# Patient Record
Sex: FEMALE | Race: WHITE | URBAN - METROPOLITAN AREA
[De-identification: names, ages, dates, MRNs, and addresses within clinical notes are randomized per-mention and may not be internally consistent; named-entity substitution may affect disease eponyms.]

---

## 2017-01-03 ENCOUNTER — EMERGENCY (EMERGENCY)
Facility: HOSPITAL | Age: 82
LOS: 0 days | Discharge: HOME | End: 2017-01-04
Admitting: EMERGENCY MEDICINE
Payer: MEDICARE

## 2017-01-03 DIAGNOSIS — R06.02 SHORTNESS OF BREATH: ICD-10-CM

## 2017-01-03 DIAGNOSIS — E03.9 HYPOTHYROIDISM, UNSPECIFIED: ICD-10-CM

## 2017-01-03 DIAGNOSIS — I48.91 UNSPECIFIED ATRIAL FIBRILLATION: ICD-10-CM

## 2017-01-03 DIAGNOSIS — M06.9 RHEUMATOID ARTHRITIS, UNSPECIFIED: ICD-10-CM

## 2017-01-03 DIAGNOSIS — R06.09 OTHER FORMS OF DYSPNEA: ICD-10-CM

## 2017-01-03 PROCEDURE — 71010: CPT | Mod: 26

## 2017-01-03 PROCEDURE — 93010 ELECTROCARDIOGRAM REPORT: CPT

## 2017-01-03 PROCEDURE — 71250 CT THORAX DX C-: CPT | Mod: 26

## 2017-01-03 PROCEDURE — 99284 EMERGENCY DEPT VISIT MOD MDM: CPT

## 2017-03-15 ENCOUNTER — RESULT REVIEW (OUTPATIENT)
Age: 82
End: 2017-03-15

## 2017-04-02 ENCOUNTER — RESULT REVIEW (OUTPATIENT)
Age: 82
End: 2017-04-02

## 2017-04-16 ENCOUNTER — INPATIENT (INPATIENT)
Facility: HOSPITAL | Age: 82
LOS: 5 days | Discharge: HOME HEALTH | End: 2017-04-22
Attending: INTERNAL MEDICINE | Admitting: FAMILY MEDICINE
Payer: COMMERCIAL

## 2017-04-16 VITALS
WEIGHT: 145.06 LBS | SYSTOLIC BLOOD PRESSURE: 143 MMHG | OXYGEN SATURATION: 97 % | HEART RATE: 99 BPM | DIASTOLIC BLOOD PRESSURE: 72 MMHG | RESPIRATION RATE: 15 BRPM | HEIGHT: 60 IN | TEMPERATURE: 99 F

## 2017-04-16 LAB
ADD ON TEST-SPECIMEN IN LAB: SIGNIFICANT CHANGE UP
ALBUMIN SERPL ELPH-MCNC: 3.5 G/DL — SIGNIFICANT CHANGE UP (ref 3.3–5)
ALP SERPL-CCNC: 52 U/L — SIGNIFICANT CHANGE UP (ref 40–120)
ALT FLD-CCNC: 18 U/L — SIGNIFICANT CHANGE UP (ref 12–78)
ANION GAP SERPL CALC-SCNC: 11 MMOL/L — SIGNIFICANT CHANGE UP (ref 5–17)
APPEARANCE UR: CLEAR — SIGNIFICANT CHANGE UP
AST SERPL-CCNC: 15 U/L — SIGNIFICANT CHANGE UP (ref 15–37)
BACTERIA # UR AUTO: (no result)
BASOPHILS # BLD AUTO: 0 K/UL — SIGNIFICANT CHANGE UP (ref 0–0.2)
BASOPHILS NFR BLD AUTO: 0.6 % — SIGNIFICANT CHANGE UP (ref 0–2)
BILIRUB SERPL-MCNC: 1.1 MG/DL — SIGNIFICANT CHANGE UP (ref 0.2–1.2)
BILIRUB UR-MCNC: NEGATIVE — SIGNIFICANT CHANGE UP
BUN SERPL-MCNC: 19 MG/DL — SIGNIFICANT CHANGE UP (ref 7–23)
CALCIUM SERPL-MCNC: 8.7 MG/DL — SIGNIFICANT CHANGE UP (ref 8.5–10.1)
CHLORIDE SERPL-SCNC: 108 MMOL/L — SIGNIFICANT CHANGE UP (ref 96–108)
CO2 SERPL-SCNC: 25 MMOL/L — SIGNIFICANT CHANGE UP (ref 22–31)
COLOR SPEC: YELLOW — SIGNIFICANT CHANGE UP
CREAT SERPL-MCNC: 1.11 MG/DL — SIGNIFICANT CHANGE UP (ref 0.5–1.3)
D DIMER BLD IA.RAPID-MCNC: 458 NG/ML DDU — HIGH
DIFF PNL FLD: (no result)
EOSINOPHIL # BLD AUTO: 0 K/UL — SIGNIFICANT CHANGE UP (ref 0–0.5)
EOSINOPHIL NFR BLD AUTO: 0 % — SIGNIFICANT CHANGE UP (ref 0–6)
EPI CELLS # UR: SIGNIFICANT CHANGE UP
GLUCOSE SERPL-MCNC: 214 MG/DL — HIGH (ref 70–99)
GLUCOSE UR QL: 1000 MG/DL
HCT VFR BLD CALC: 37.8 % — SIGNIFICANT CHANGE UP (ref 34.5–45)
HGB BLD-MCNC: 12.7 G/DL — SIGNIFICANT CHANGE UP (ref 11.5–15.5)
KETONES UR-MCNC: NEGATIVE — SIGNIFICANT CHANGE UP
LEUKOCYTE ESTERASE UR-ACNC: NEGATIVE — SIGNIFICANT CHANGE UP
LYMPHOCYTES # BLD AUTO: 1.1 K/UL — SIGNIFICANT CHANGE UP (ref 1–3.3)
LYMPHOCYTES # BLD AUTO: 15.6 % — SIGNIFICANT CHANGE UP (ref 13–44)
MAGNESIUM SERPL-MCNC: 2.2 MG/DL — SIGNIFICANT CHANGE UP (ref 1.8–2.4)
MCHC RBC-ENTMCNC: 32.6 PG — SIGNIFICANT CHANGE UP (ref 27–34)
MCHC RBC-ENTMCNC: 33.6 GM/DL — SIGNIFICANT CHANGE UP (ref 32–36)
MCV RBC AUTO: 96.8 FL — SIGNIFICANT CHANGE UP (ref 80–100)
MONOCYTES # BLD AUTO: 0.3 K/UL — SIGNIFICANT CHANGE UP (ref 0–0.9)
MONOCYTES NFR BLD AUTO: 4.3 % — SIGNIFICANT CHANGE UP (ref 2–14)
NEUTROPHILS # BLD AUTO: 5.7 K/UL — SIGNIFICANT CHANGE UP (ref 1.8–7.4)
NEUTROPHILS NFR BLD AUTO: 79.4 % — HIGH (ref 43–77)
NITRITE UR-MCNC: NEGATIVE — SIGNIFICANT CHANGE UP
NT-PROBNP SERPL-SCNC: 168 PG/ML — SIGNIFICANT CHANGE UP (ref 0–450)
PH UR: 7 — SIGNIFICANT CHANGE UP (ref 4.8–8)
PLATELET # BLD AUTO: 267 K/UL — SIGNIFICANT CHANGE UP (ref 150–400)
POTASSIUM SERPL-MCNC: 4.4 MMOL/L — SIGNIFICANT CHANGE UP (ref 3.5–5.3)
POTASSIUM SERPL-SCNC: 4.4 MMOL/L — SIGNIFICANT CHANGE UP (ref 3.5–5.3)
PROT SERPL-MCNC: 6.6 GM/DL — SIGNIFICANT CHANGE UP (ref 6–8.3)
PROT UR-MCNC: NEGATIVE MG/DL — SIGNIFICANT CHANGE UP
RBC # BLD: 3.9 M/UL — SIGNIFICANT CHANGE UP (ref 3.8–5.2)
RBC # FLD: 15.5 % — HIGH (ref 10.3–14.5)
RBC CASTS # UR COMP ASSIST: (no result) /HPF (ref 0–4)
SODIUM SERPL-SCNC: 144 MMOL/L — SIGNIFICANT CHANGE UP (ref 135–145)
SP GR SPEC: 1 — LOW (ref 1.01–1.02)
TROPONIN I SERPL-MCNC: <0.015 NG/ML — SIGNIFICANT CHANGE UP (ref 0.01–0.04)
UROBILINOGEN FLD QL: NEGATIVE MG/DL — SIGNIFICANT CHANGE UP
WBC # BLD: 7.2 K/UL — SIGNIFICANT CHANGE UP (ref 3.8–10.5)
WBC # FLD AUTO: 7.2 K/UL — SIGNIFICANT CHANGE UP (ref 3.8–10.5)
WBC UR QL: SIGNIFICANT CHANGE UP

## 2017-04-16 PROCEDURE — 93010 ELECTROCARDIOGRAM REPORT: CPT

## 2017-04-16 PROCEDURE — 99285 EMERGENCY DEPT VISIT HI MDM: CPT

## 2017-04-16 PROCEDURE — 71010: CPT | Mod: 26

## 2017-04-16 RX ORDER — FUROSEMIDE 40 MG
20 TABLET ORAL ONCE
Qty: 0 | Refills: 0 | Status: COMPLETED | OUTPATIENT
Start: 2017-04-16 | End: 2017-04-16

## 2017-04-16 RX ADMIN — Medication 20 MILLIGRAM(S): at 20:10

## 2017-04-16 NOTE — ED ADULT NURSE REASSESSMENT NOTE - NS ED NURSE REASSESS COMMENT FT1
2200: Patient watching tv, no distress, only complaint is "having to pee all the time because of that medicine." Bedside commode brought for patient and moved next to bed to allow for easy transfer. Patient instructed to use call bell for staff assistance when transferring. Awaiting MD reassessment and disposition, will continue to monitor/reassess.   2330: Patient sleeping, but easily arousable to verbal stimuli. Patient is awaiting radiology examinations and disposition. No apparent distress/stated complaints at this time. MD Parrish updated on patient's status, will continue to monitor/reassess.

## 2017-04-16 NOTE — ED PROVIDER NOTE - PROGRESS NOTE DETAILS
Pt with normal troponin, BNP, CXR appears no pulmonary edema, pleural effusion, or pna.  D-dimer is elevated.  GFR 44, but Cr 1.11.  I think at this time, pt may have CHF, vs PE.  Given elevated d-dimer, will obtain CT PE study, as Cr 1.11 feel okay for contrast at this time.  Lasix given.  Dopplers of lower extremities ordered.

## 2017-04-16 NOTE — ED PROVIDER NOTE - CARE PLAN
Principal Discharge DX:	Chest pain, unspecified type  Secondary Diagnosis:	SOB (shortness of breath)

## 2017-04-16 NOTE — ED ADULT NURSE NOTE - ED STAT RN HANDOFF DETAILS
Received report from SONG Correa and reassessed patient. Agree with the previous RN assessment. Patient is awaiting lab/urine results and disposition. No change in status or complaints at this time. Will continue to monitor/reassess.

## 2017-04-16 NOTE — ED PROVIDER NOTE - NS ED MD SCRIBE ATTENDING SCRIBE SECTIONS
HISTORY OF PRESENT ILLNESS/PAST MEDICAL/SURGICAL/SOCIAL HISTORY/REVIEW OF SYSTEMS/PROGRESS NOTE/RESULTS/VITAL SIGNS( Pullset)/DISPOSITION/PHYSICAL EXAM

## 2017-04-16 NOTE — ED PROVIDER NOTE - OBJECTIVE STATEMENT
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 Pt states she was eating easter dinner tonight when she felt that she could breath and chew at the same time which prompted her to seek evaluation. Pt denies CP, n/v/d, HA, dysuria. Cards is [facility], pt states she has an appointment with him tomorrow for an echocardiogram. Pt started lasix 20mg 1x a day 2 weeks ago. No other complaints. Pt states she has to sleep with 3 pillows at night to feel comfortable. 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 Pt states she was eating easter dinner tonight when she felt that she could breath and chew at the same time which prompted her to seek evaluation. Pt denies CP, n/v/d, HA, dysuria. Cards is Pap[facility]o, pt states she has an appointment with him tomorrow for an echocardiogram. Pt started lasix 20mg 1x a day 2 weeks ago. No other complaints. Pt states she has to sleep with 3 pillows at night to feel comfortable.

## 2017-04-16 NOTE — ED ADULT NURSE NOTE - OBJECTIVE STATEMENT
rec'd pt c/o SOB and weakness x 1 week. pt has bilateral pitting edema noted on lower extremities. VSS, no acute distress noted.

## 2017-04-17 LAB
ANION GAP SERPL CALC-SCNC: 8 MMOL/L — SIGNIFICANT CHANGE UP (ref 5–17)
BASOPHILS # BLD AUTO: 0.1 K/UL — SIGNIFICANT CHANGE UP (ref 0–0.2)
BASOPHILS NFR BLD AUTO: 0.8 % — SIGNIFICANT CHANGE UP (ref 0–2)
BUN SERPL-MCNC: 15 MG/DL — SIGNIFICANT CHANGE UP (ref 7–23)
CALCIUM SERPL-MCNC: 9.4 MG/DL — SIGNIFICANT CHANGE UP (ref 8.5–10.1)
CHLORIDE SERPL-SCNC: 105 MMOL/L — SIGNIFICANT CHANGE UP (ref 96–108)
CO2 SERPL-SCNC: 29 MMOL/L — SIGNIFICANT CHANGE UP (ref 22–31)
CREAT SERPL-MCNC: 0.77 MG/DL — SIGNIFICANT CHANGE UP (ref 0.5–1.3)
DIGOXIN SERPL-MCNC: 0.5 NG/ML — LOW (ref 0.8–2)
EOSINOPHIL # BLD AUTO: 0.1 K/UL — SIGNIFICANT CHANGE UP (ref 0–0.5)
EOSINOPHIL NFR BLD AUTO: 1.1 % — SIGNIFICANT CHANGE UP (ref 0–6)
GLUCOSE SERPL-MCNC: 105 MG/DL — HIGH (ref 70–99)
HBA1C BLD-MCNC: 7 % — HIGH (ref 4–5.6)
HCT VFR BLD CALC: 40.7 % — SIGNIFICANT CHANGE UP (ref 34.5–45)
HGB BLD-MCNC: 13.3 G/DL — SIGNIFICANT CHANGE UP (ref 11.5–15.5)
LYMPHOCYTES # BLD AUTO: 1.8 K/UL — SIGNIFICANT CHANGE UP (ref 1–3.3)
LYMPHOCYTES # BLD AUTO: 27.4 % — SIGNIFICANT CHANGE UP (ref 13–44)
MCHC RBC-ENTMCNC: 31.4 PG — SIGNIFICANT CHANGE UP (ref 27–34)
MCHC RBC-ENTMCNC: 32.6 GM/DL — SIGNIFICANT CHANGE UP (ref 32–36)
MCV RBC AUTO: 96.3 FL — SIGNIFICANT CHANGE UP (ref 80–100)
MONOCYTES # BLD AUTO: 0.6 K/UL — SIGNIFICANT CHANGE UP (ref 0–0.9)
MONOCYTES NFR BLD AUTO: 8.7 % — SIGNIFICANT CHANGE UP (ref 2–14)
NEUTROPHILS # BLD AUTO: 4.1 K/UL — SIGNIFICANT CHANGE UP (ref 1.8–7.4)
NEUTROPHILS NFR BLD AUTO: 62 % — SIGNIFICANT CHANGE UP (ref 43–77)
PLATELET # BLD AUTO: 311 K/UL — SIGNIFICANT CHANGE UP (ref 150–400)
POTASSIUM SERPL-MCNC: 3.6 MMOL/L — SIGNIFICANT CHANGE UP (ref 3.5–5.3)
POTASSIUM SERPL-SCNC: 3.6 MMOL/L — SIGNIFICANT CHANGE UP (ref 3.5–5.3)
RBC # BLD: 4.22 M/UL — SIGNIFICANT CHANGE UP (ref 3.8–5.2)
RBC # FLD: 15.9 % — HIGH (ref 10.3–14.5)
SODIUM SERPL-SCNC: 142 MMOL/L — SIGNIFICANT CHANGE UP (ref 135–145)
TROPONIN I SERPL-MCNC: <0.015 NG/ML — SIGNIFICANT CHANGE UP (ref 0.01–0.04)
WBC # BLD: 6.6 K/UL — SIGNIFICANT CHANGE UP (ref 3.8–10.5)
WBC # FLD AUTO: 6.6 K/UL — SIGNIFICANT CHANGE UP (ref 3.8–10.5)

## 2017-04-17 PROCEDURE — 93306 TTE W/DOPPLER COMPLETE: CPT | Mod: 26

## 2017-04-17 PROCEDURE — 71275 CT ANGIOGRAPHY CHEST: CPT | Mod: 26

## 2017-04-17 PROCEDURE — 76700 US EXAM ABDOM COMPLETE: CPT | Mod: 26

## 2017-04-17 PROCEDURE — 93970 EXTREMITY STUDY: CPT | Mod: 26

## 2017-04-17 PROCEDURE — 76856 US EXAM PELVIC COMPLETE: CPT | Mod: 26

## 2017-04-17 RX ORDER — POTASSIUM CHLORIDE 20 MEQ
40 PACKET (EA) ORAL EVERY 4 HOURS
Qty: 0 | Refills: 0 | Status: COMPLETED | OUTPATIENT
Start: 2017-04-17 | End: 2017-04-17

## 2017-04-17 RX ORDER — FUROSEMIDE 40 MG
40 TABLET ORAL ONCE
Qty: 0 | Refills: 0 | Status: COMPLETED | OUTPATIENT
Start: 2017-04-17 | End: 2017-04-17

## 2017-04-17 RX ORDER — DIGOXIN 250 MCG
0.12 TABLET ORAL DAILY
Qty: 0 | Refills: 0 | Status: DISCONTINUED | OUTPATIENT
Start: 2017-04-17 | End: 2017-04-22

## 2017-04-17 RX ORDER — GLUCAGON INJECTION, SOLUTION 0.5 MG/.1ML
1 INJECTION, SOLUTION SUBCUTANEOUS ONCE
Qty: 0 | Refills: 0 | Status: DISCONTINUED | OUTPATIENT
Start: 2017-04-17 | End: 2017-04-22

## 2017-04-17 RX ORDER — DEXTROSE 50 % IN WATER 50 %
1 SYRINGE (ML) INTRAVENOUS ONCE
Qty: 0 | Refills: 0 | Status: DISCONTINUED | OUTPATIENT
Start: 2017-04-17 | End: 2017-04-22

## 2017-04-17 RX ORDER — AMLODIPINE BESYLATE 2.5 MG/1
5 TABLET ORAL DAILY
Qty: 0 | Refills: 0 | Status: DISCONTINUED | OUTPATIENT
Start: 2017-04-17 | End: 2017-04-22

## 2017-04-17 RX ORDER — DOCUSATE SODIUM 100 MG
100 CAPSULE ORAL THREE TIMES A DAY
Qty: 0 | Refills: 0 | Status: DISCONTINUED | OUTPATIENT
Start: 2017-04-17 | End: 2017-04-22

## 2017-04-17 RX ORDER — DEXTROSE 50 % IN WATER 50 %
25 SYRINGE (ML) INTRAVENOUS ONCE
Qty: 0 | Refills: 0 | Status: DISCONTINUED | OUTPATIENT
Start: 2017-04-17 | End: 2017-04-22

## 2017-04-17 RX ORDER — CHOLECALCIFEROL (VITAMIN D3) 125 MCG
1000 CAPSULE ORAL DAILY
Qty: 0 | Refills: 0 | Status: DISCONTINUED | OUTPATIENT
Start: 2017-04-17 | End: 2017-04-22

## 2017-04-17 RX ORDER — FAMOTIDINE 10 MG/ML
20 INJECTION INTRAVENOUS ONCE
Qty: 0 | Refills: 0 | Status: COMPLETED | OUTPATIENT
Start: 2017-04-17 | End: 2017-04-17

## 2017-04-17 RX ORDER — LEVOTHYROXINE SODIUM 125 MCG
25 TABLET ORAL DAILY
Qty: 0 | Refills: 0 | Status: DISCONTINUED | OUTPATIENT
Start: 2017-04-17 | End: 2017-04-17

## 2017-04-17 RX ORDER — FOLIC ACID 0.8 MG
1 TABLET ORAL DAILY
Qty: 0 | Refills: 0 | Status: DISCONTINUED | OUTPATIENT
Start: 2017-04-17 | End: 2017-04-17

## 2017-04-17 RX ORDER — PREGABALIN 225 MG/1
1000 CAPSULE ORAL DAILY
Qty: 0 | Refills: 0 | Status: DISCONTINUED | OUTPATIENT
Start: 2017-04-17 | End: 2017-04-22

## 2017-04-17 RX ORDER — INSULIN LISPRO 100/ML
VIAL (ML) SUBCUTANEOUS AT BEDTIME
Qty: 0 | Refills: 0 | Status: DISCONTINUED | OUTPATIENT
Start: 2017-04-17 | End: 2017-04-22

## 2017-04-17 RX ORDER — ASPIRIN/CALCIUM CARB/MAGNESIUM 324 MG
81 TABLET ORAL DAILY
Qty: 0 | Refills: 0 | Status: DISCONTINUED | OUTPATIENT
Start: 2017-04-17 | End: 2017-04-21

## 2017-04-17 RX ORDER — DEXTROSE 50 % IN WATER 50 %
12.5 SYRINGE (ML) INTRAVENOUS ONCE
Qty: 0 | Refills: 0 | Status: DISCONTINUED | OUTPATIENT
Start: 2017-04-17 | End: 2017-04-22

## 2017-04-17 RX ORDER — LEVOTHYROXINE SODIUM 125 MCG
25 TABLET ORAL DAILY
Qty: 0 | Refills: 0 | Status: DISCONTINUED | OUTPATIENT
Start: 2017-04-17 | End: 2017-04-22

## 2017-04-17 RX ORDER — INSULIN LISPRO 100/ML
VIAL (ML) SUBCUTANEOUS
Qty: 0 | Refills: 0 | Status: DISCONTINUED | OUTPATIENT
Start: 2017-04-17 | End: 2017-04-22

## 2017-04-17 RX ORDER — SODIUM CHLORIDE 9 MG/ML
1000 INJECTION, SOLUTION INTRAVENOUS
Qty: 0 | Refills: 0 | Status: DISCONTINUED | OUTPATIENT
Start: 2017-04-17 | End: 2017-04-22

## 2017-04-17 RX ORDER — HEPARIN SODIUM 5000 [USP'U]/ML
5000 INJECTION INTRAVENOUS; SUBCUTANEOUS EVERY 8 HOURS
Qty: 0 | Refills: 0 | Status: DISCONTINUED | OUTPATIENT
Start: 2017-04-17 | End: 2017-04-21

## 2017-04-17 RX ORDER — FOLIC ACID 0.8 MG
3 TABLET ORAL DAILY
Qty: 0 | Refills: 0 | Status: DISCONTINUED | OUTPATIENT
Start: 2017-04-17 | End: 2017-04-22

## 2017-04-17 RX ORDER — FUROSEMIDE 40 MG
20 TABLET ORAL DAILY
Qty: 0 | Refills: 0 | Status: DISCONTINUED | OUTPATIENT
Start: 2017-04-17 | End: 2017-04-17

## 2017-04-17 RX ORDER — METHOTREXATE 2.5 MG/1
20 TABLET ORAL
Qty: 0 | Refills: 0 | Status: DISCONTINUED | OUTPATIENT
Start: 2017-04-17 | End: 2017-04-18

## 2017-04-17 RX ORDER — SENNA PLUS 8.6 MG/1
2 TABLET ORAL AT BEDTIME
Qty: 0 | Refills: 0 | Status: DISCONTINUED | OUTPATIENT
Start: 2017-04-17 | End: 2017-04-22

## 2017-04-17 RX ORDER — LEVOTHYROXINE SODIUM 125 MCG
125 TABLET ORAL DAILY
Qty: 0 | Refills: 0 | Status: DISCONTINUED | OUTPATIENT
Start: 2017-04-17 | End: 2017-04-17

## 2017-04-17 RX ADMIN — AMLODIPINE BESYLATE 5 MILLIGRAM(S): 2.5 TABLET ORAL at 20:04

## 2017-04-17 RX ADMIN — Medication 40 MILLIGRAM(S): at 18:43

## 2017-04-17 RX ADMIN — Medication 40 MILLIEQUIVALENT(S): at 18:50

## 2017-04-17 RX ADMIN — Medication 4: at 18:49

## 2017-04-17 RX ADMIN — Medication 40 MILLIEQUIVALENT(S): at 22:50

## 2017-04-17 RX ADMIN — Medication 100 MILLIGRAM(S): at 20:08

## 2017-04-17 RX ADMIN — Medication 0.12 MILLIGRAM(S): at 20:08

## 2017-04-17 RX ADMIN — HEPARIN SODIUM 5000 UNIT(S): 5000 INJECTION INTRAVENOUS; SUBCUTANEOUS at 22:50

## 2017-04-17 RX ADMIN — FAMOTIDINE 20 MILLIGRAM(S): 10 INJECTION INTRAVENOUS at 01:10

## 2017-04-17 NOTE — CONSULT NOTE ADULT - SUBJECTIVE AND OBJECTIVE BOX
Patient is a 88y old  Female who presents with a chief complaint of shortness of breath. (2017 11:58)      HPI:  88F.  c/o shortness of breath.  onset 2 weeks ago.  a/w 15 pound weight gain over 3 weeks.  (+) fatigue.  also notes she has distention of her abdomen.      when well, patient able to drive, shop and walk over short distances.  as of late, patient has observed a decrease in capacity for these activities.    in ED, found to have an elevated D-dimer.  b/l LE venous dopplers and CTA negative for VTE.  of note, CTA also negative for findings c/w acute HF.      serum glucose >200 and urine analysis 1g of glucose.  patient denies hx of DM.  (+) polyuria, but also reports a hx of recurrent UTIs.    PMHx:  pHTN;  HTN;  hyperlipidemia;  HFpEF;  ICH (, taken off warfarin at that time);  AF (ASA);  RA (chronic prednisone);  recurrent UTIs;  hypothyroidism.    PSHx:  hip replacement.    ALL:  PCN;  sulfa    SocHx:  community dweller.  private residence.  lives alone.  drives locally.      FHx:  NC. (2017 11:58)      PAST MEDICAL & SURGICAL HISTORY:      MEDICATIONS  (STANDING):  heparin  Injectable 5000Unit(s) SubCutaneous every 8 hours  docusate sodium 100milliGRAM(s) Oral three times a day  insulin lispro (HumaLOG) corrective regimen sliding scale  SubCutaneous three times a day before meals  insulin lispro (HumaLOG) corrective regimen sliding scale  SubCutaneous at bedtime  dextrose 5%. 1000milliLiter(s) IV Continuous <Continuous>  dextrose 50% Injectable 12.5Gram(s) IV Push once  dextrose 50% Injectable 25Gram(s) IV Push once  dextrose 50% Injectable 25Gram(s) IV Push once  predniSONE   Tablet 10milliGRAM(s) Oral daily  aspirin enteric coated 81milliGRAM(s) Oral daily  levothyroxine 25MICROGram(s) Oral daily  folic acid 1milliGRAM(s) Oral daily  cyanocobalamin 1000MICROGram(s) Oral daily  cholecalciferol 1000Unit(s) Oral daily  potassium chloride    Tablet ER 40milliEquivalent(s) Oral every 4 hours  furosemide   Injectable 40milliGRAM(s) IV Push once    MEDICATIONS  (PRN):  senna 2Tablet(s) Oral at bedtime PRN Constipation  dextrose Gel 1Dose(s) Oral once PRN Blood Glucose LESS THAN 70 milliGRAM(s)/deciliter  glucagon  Injectable 1milliGRAM(s) IntraMuscular once PRN Glucose LESS THAN 70 milligrams/deciliter      FAMILY HISTORY:      SOCIAL HISTORY:    REVIEW OF SYSTEMS:  CONSTITUTIONAL:  No night sweats.  No fatigue, malaise, lethargy.  No fever or chills.  HEENT:  Eyes:  No visual changes.  No eye pain.      ENT:  No runny nose.  No epistaxis.  No sinus pain.  No sore throat.  No odynophagia.  No ear pain.  No congestion.  RESPIRATORY: c/o  shortness of breath.  CARDIOVASCULAR:  No chest pains.  No palpitations. c/o shortness of breath, No orthopnea or PND.  GASTROINTESTINAL:  No abdominal pain.  No nausea or vomiting.  No diarrhea or constipation.  No hematemesis.  No hematochezia.  No melena.  GENITOURINARY:  No urgency.  No frequency.  No dysuria.  No hematuria.  No obstructive symptoms.  No discharge.  No pain.  No significant abnormal bleeding.  MUSCULOSKELETAL:  No musculoskeletal pain.  No joint swelling.  No arthritis.  NEUROLOGICAL:  No tingling or numbness or weakness.  PSYCHIATRIC:  No confusion  SKIN:  No rashes.  No lesions.  No wounds.  ENDOCRINE:  No unexplained weight loss.  No polydipsia.  No polyuria.  No polyphagia.  HEMATOLOGIC:  No anemia.  No purpura.  No petechiae.  No prolonged or excessive bleeding.   ALLERGIC AND IMMUNOLOGIC:  No pruritus.  No swelling.         Vital Signs Last 24 Hrs  T(C): 36.3, Max: 37.1 (-16 @ 18:43)  T(F): 97.4, Max: 98.7 (04-16 @ 18:43)  HR: 70 (59 - 99)  BP: 126/64 (126/64 - 143/72)  BP(mean): 81 (81 - 81)  RR: 18 (15 - 18)  SpO2: 100% (95% - 100%)    PHYSICAL EXAM-    Constitutional: The patient appears to be normal, well developed, well nourished and alert and oriented to time, place and person. The patient does not appear acutely ill.     Head: Head is normocephalic and atraumatic.      Neck: The patient's neck is supple without enlargement, has no palpable thyromegaly nor thyroid nodules and has no jugular venous distention. No audible carotid bruits. There are strong carotid pulses bilaterally. No JVD.     Cardiovascular: Regular rate and rhythm without S3, S4. No murmurs or rubs are appreciated.      Respiratory: crackles b/l basal    Abdomen: Soft, nontender, nondistended with positive bowel sounds.      Extremity:pitting pedal edema 2+ b/l   Neurologic: The patient is alert and oriented.      Skin: No rash, no obvious lesions noted.      Psychiatric: The patient appears to be emotionally stable.      INTERPRETATION OF TELEMETRY: sinus rythm    ECG: sinus rythm, Qwaves in inferior leads.     I&O's Detail      LABS:                        13.3   6.6   )-----------( 311      ( 2017 13:35 )             40.7     -    142  |  105  |  15  ----------------------------<  105<H>  3.6   |  29  |  0.77    Ca    9.4      2017 13:35  Mg     2.2     -    TPro  6.6  /  Alb  3.5  /  TBili  1.1  /  DBili  x   /  AST  15  /  ALT  18  /  AlkPhos  52      CARDIAC MARKERS ( 2017 23:56 )  <0.015 ng/mL / x     / x     / x     / x      CARDIAC MARKERS ( 2017 19:20 )  <0.015 ng/mL / x     / x     / x     / x            Urinalysis Basic - ( 2017 20:16 )    Color: Yellow / Appearance: Clear / S.005 / pH: x  Gluc: x / Ketone: Negative  / Bili: Negative / Urobili: Negative mg/dL   Blood: x / Protein: Negative mg/dL / Nitrite: Negative   Leuk Esterase: Negative / RBC: 3-5 /HPF / WBC 0-2   Sq Epi: x / Non Sq Epi: Few / Bacteria: Few      I&O's Summary    BNPSerum Pro-Brain Natriuretic Peptide: 168 pg/mL ( @ 19:38)    RADIOLOGY & ADDITIONAL STUDIES:

## 2017-04-17 NOTE — H&P ADULT - NSHPOUTPATIENTPROVIDERS_GEN_ALL_CORE
PMD-Dr. Toya Greenfield;  Dr. Bhatia. PMD-Dr. Toya Greenfield;  Dr. Bhatia.  pharmacy [phone], d/w pharmacist to clarify medications.

## 2017-04-17 NOTE — PATIENT PROFILE ADULT. - ABILITY TO HEAR (WITH HEARING AID OR HEARING APPLIANCE IF NORMALLY USED):
Mildly to Moderately Impaired: difficulty hearing in some environments or speaker may need to increase volume or speak distinctly/need hearing aides

## 2017-04-17 NOTE — CONSULT NOTE ADULT - ASSESSMENT
Acute on chronic decompensated HFpEF and RV systolic HF- continue diuresis with iv lasix and dose daily based on assesment.  Strict I/O and daily wt checks. Low sodium diet. Nutrition education.    Close monitoring of electorlytes and renal function with diuresis.    HTN- cotinue current meds.  Hyperlipidemia- contine oupt meds.  Other medical issues- RA - Management pre primary team.    Thank you for allowing me to participate in the care of this patient. Please feel free to contact me with any questions.

## 2017-04-17 NOTE — ED ADULT NURSE REASSESSMENT NOTE - NS ED NURSE REASSESS COMMENT FT1
pt received at 0700, alert and orientedx4, VSS afebrile, pt resting comfortably in bed, no complaints at this time, All needs anticipated and met, safety maintained will continue to monitor

## 2017-04-17 NOTE — ED ADULT NURSE REASSESSMENT NOTE - NS ED NURSE REASSESS COMMENT FT1
0300: Patient is sleeping, no apparent distress/complaints. Patient to be admitted, awaiting hospitalist MD admission assessment. Will continue to monitor/reassess.   0600: Patient awake and watching tv; no complaints at this time. Patient is awaiting hospitalist MD admission assessment, will continue to monitor/reassess.

## 2017-04-17 NOTE — H&P ADULT - ASSESSMENT
88F.  c/o shortness of breath.  onset 2 weeks ago.  a/w 15 pound weight gain over 3 weeks.  (+) fatigue.  also notes she has distention of her abdomen.  serum glucose >200 and urine analysis 1g of glucose.  patient denies hx of DM.  (+) polyuria, but also reports a hx of recurrent UTIs.    dyspnea and mild peripheral edema.  hx HFpEF and pHTN.  BNP nml and CTA w/o evidence of acute HF or PE.  TnI negative.  admit to telemetry unit.  check TTE, ? right side HF.  c/w Lasix 20mg po qd.  Cardiology consult.    abdominal distention.  LFTs wnl.  abdominal US, r/o ascities;  adenexal mass;  constipation.  consider CT AB/pelvis pending results.    new onset DM.  exacerbated by prednisone.  ADA.  A1C.  FSBG.  correction insulin coverage.  will escalate as indicated.    hx RA.  c/w prednisone.    hx hypothyroidism.  TSH.  c/w levothyroxin.    disposition.  to be determined.

## 2017-04-18 LAB
ANION GAP SERPL CALC-SCNC: 8 MMOL/L — SIGNIFICANT CHANGE UP (ref 5–17)
BUN SERPL-MCNC: 21 MG/DL — SIGNIFICANT CHANGE UP (ref 7–23)
CALCIUM SERPL-MCNC: 9 MG/DL — SIGNIFICANT CHANGE UP (ref 8.5–10.1)
CHLORIDE SERPL-SCNC: 104 MMOL/L — SIGNIFICANT CHANGE UP (ref 96–108)
CO2 SERPL-SCNC: 28 MMOL/L — SIGNIFICANT CHANGE UP (ref 22–31)
CREAT SERPL-MCNC: 0.67 MG/DL — SIGNIFICANT CHANGE UP (ref 0.5–1.3)
GLUCOSE SERPL-MCNC: 112 MG/DL — HIGH (ref 70–99)
HCT VFR BLD CALC: 37.7 % — SIGNIFICANT CHANGE UP (ref 34.5–45)
HGB BLD-MCNC: 12.8 G/DL — SIGNIFICANT CHANGE UP (ref 11.5–15.5)
MCHC RBC-ENTMCNC: 32.8 PG — SIGNIFICANT CHANGE UP (ref 27–34)
MCHC RBC-ENTMCNC: 34 GM/DL — SIGNIFICANT CHANGE UP (ref 32–36)
MCV RBC AUTO: 96.4 FL — SIGNIFICANT CHANGE UP (ref 80–100)
PLATELET # BLD AUTO: 257 K/UL — SIGNIFICANT CHANGE UP (ref 150–400)
POTASSIUM SERPL-MCNC: 4.2 MMOL/L — SIGNIFICANT CHANGE UP (ref 3.5–5.3)
POTASSIUM SERPL-SCNC: 4.2 MMOL/L — SIGNIFICANT CHANGE UP (ref 3.5–5.3)
RBC # BLD: 3.91 M/UL — SIGNIFICANT CHANGE UP (ref 3.8–5.2)
RBC # FLD: 15.5 % — HIGH (ref 10.3–14.5)
SODIUM SERPL-SCNC: 140 MMOL/L — SIGNIFICANT CHANGE UP (ref 135–145)
TSH SERPL-MCNC: 2.32 UU/ML — SIGNIFICANT CHANGE UP (ref 0.36–3.74)
WBC # BLD: 6.8 K/UL — SIGNIFICANT CHANGE UP (ref 3.8–10.5)
WBC # FLD AUTO: 6.8 K/UL — SIGNIFICANT CHANGE UP (ref 3.8–10.5)

## 2017-04-18 PROCEDURE — 76830 TRANSVAGINAL US NON-OB: CPT | Mod: 26

## 2017-04-18 RX ORDER — METHOTREXATE 2.5 MG/1
20 TABLET ORAL
Qty: 0 | Refills: 0 | Status: DISCONTINUED | OUTPATIENT
Start: 2017-04-22 | End: 2017-04-22

## 2017-04-18 RX ADMIN — Medication 100 MILLIGRAM(S): at 13:40

## 2017-04-18 RX ADMIN — HEPARIN SODIUM 5000 UNIT(S): 5000 INJECTION INTRAVENOUS; SUBCUTANEOUS at 06:50

## 2017-04-18 RX ADMIN — PREGABALIN 1000 MICROGRAM(S): 225 CAPSULE ORAL at 11:37

## 2017-04-18 RX ADMIN — HEPARIN SODIUM 5000 UNIT(S): 5000 INJECTION INTRAVENOUS; SUBCUTANEOUS at 13:41

## 2017-04-18 RX ADMIN — Medication 100 MILLIGRAM(S): at 06:49

## 2017-04-18 RX ADMIN — Medication 25 MICROGRAM(S): at 06:49

## 2017-04-18 RX ADMIN — Medication 81 MILLIGRAM(S): at 11:37

## 2017-04-18 RX ADMIN — HEPARIN SODIUM 5000 UNIT(S): 5000 INJECTION INTRAVENOUS; SUBCUTANEOUS at 21:45

## 2017-04-18 RX ADMIN — Medication 3 MILLIGRAM(S): at 11:37

## 2017-04-18 RX ADMIN — Medication 1000 UNIT(S): at 11:37

## 2017-04-18 RX ADMIN — Medication 0.12 MILLIGRAM(S): at 06:49

## 2017-04-18 RX ADMIN — Medication 2: at 11:36

## 2017-04-18 RX ADMIN — AMLODIPINE BESYLATE 5 MILLIGRAM(S): 2.5 TABLET ORAL at 06:49

## 2017-04-18 RX ADMIN — Medication 10 MILLIGRAM(S): at 06:49

## 2017-04-18 NOTE — PROGRESS NOTE ADULT - SUBJECTIVE AND OBJECTIVE BOX
TD04/18:  3N.  patient reports she feels well.  breathing has improved.      reports that her son noted here abdomen has been becoming more distended.  a/w small, "pebble" stools and 10 pound weight gain over a few days.  states her last colonoscopy was approximately 1 1/2 years ago w/ Dr. Bang.  a colonic polyp was resected and patient advised to follow up for a serial colonoscopy for which she has not accomplished to date.       Allergies    penicillins (Unknown)  sulfa drugs (Unknown)    Intolerances      MEDICATIONS  (STANDING):  heparin  Injectable 5000Unit(s) SubCutaneous every 8 hours  docusate sodium 100milliGRAM(s) Oral three times a day  insulin lispro (HumaLOG) corrective regimen sliding scale  SubCutaneous three times a day before meals  insulin lispro (HumaLOG) corrective regimen sliding scale  SubCutaneous at bedtime  dextrose 5%. 1000milliLiter(s) IV Continuous <Continuous>  dextrose 50% Injectable 12.5Gram(s) IV Push once  dextrose 50% Injectable 25Gram(s) IV Push once  dextrose 50% Injectable 25Gram(s) IV Push once  predniSONE   Tablet 10milliGRAM(s) Oral daily  aspirin enteric coated 81milliGRAM(s) Oral daily  cyanocobalamin 1000MICROGram(s) Oral daily  cholecalciferol 1000Unit(s) Oral daily  digoxin     Tablet 0.125milliGRAM(s) Oral daily  methotrexate (Non - oncologic) 20milliGRAM(s) Oral every week  amLODIPine   Tablet 5milliGRAM(s) Oral daily  folic acid 3milliGRAM(s) Oral daily  levothyroxine 25MICROGram(s) Oral daily    MEDICATIONS  (PRN):  senna 2Tablet(s) Oral at bedtime PRN Constipation  dextrose Gel 1Dose(s) Oral once PRN Blood Glucose LESS THAN 70 milliGRAM(s)/deciliter  glucagon  Injectable 1milliGRAM(s) IntraMuscular once PRN Glucose LESS THAN 70 milligrams/deciliter    Vital Signs Last 24 Hrs  T(C): 36.6, Max: 36.7 (04-18 @ 10:09)  T(F): 97.9, Max: 98 (04-18 @ 10:09)  HR: 61 (60 - 71)  BP: 134/54 (122/58 - 136/64)  BP(mean): --  RR: 17 (17 - 18)  SpO2: 98% (95% - 99%)    appears well today.  comfortable.  no respiratory distress.  lungs clear.  no WRR.  heart RRR.  NS1S2.  distant heart sounds.  abdomen distended.  soft and nontender.  no pedal edema.  A&Ox3.                        12.8   6.8   )-----------( 257      ( 18 Apr 2017 06:23 )             37.7       04-18    140  |  104  |  21  ----------------------------<  112<H>  4.2   |  28  |  0.67    Ca    9.0      18 Apr 2017 06:23  Mg     2.2     04-16    TPro  6.6  /  Alb  3.5  /  TBili  1.1  /  DBili  x   /  AST  15  /  ALT  18  /  AlkPhos  52  04-16    CARDIAC MARKERS ( 16 Apr 2017 23:56 )  <0.015 ng/mL / x     / x     / x     / x      CARDIAC MARKERS ( 16 Apr 2017 19:20 )  <0.015 ng/mL / x     / x     / x     / x        Fibrocalcific changes noted to the mitral valve leaflets with preserved   leaflet excursion.   Trace mitral regurgitation is present.   Fibrocalcific changes noted to the aortic valve leaflets with preserved   excursion.   Mild (1+) aortic regurgitation is present.   Trace to mild tricuspid valve regurgitation is present.   The left ventricle is normal in size, wall thickness, wall motion and   contractility.   Estimated left ventricular ejection fraction is 55 %.   Normal right ventricle structure and function.

## 2017-04-18 NOTE — PROGRESS NOTE ADULT - ASSESSMENT
88F.  c/o shortness of breath.  onset 2 weeks ago.  a/w 15 pound weight gain over 3 weeks.  (+) fatigue.  also notes she has distention of her abdomen.  serum glucose >200 and urine analysis 1g of glucose.  patient denies hx of DM.  (+) polyuria, but also reports a hx of recurrent UTIs.    dyspnea and mild peripheral edema-both improved after Lasix.  hx HFpEF and pHTN.  BNP nml and CTA w/o evidence of acute HF or PE.  TnI negative.  telemetry reviewed.  NSR 80-90.  TTE, nml LVEF and RV structure and function.  c/w Lasix 20mg po qd.  Cardiology input noted.    abdominal distention.  "pebble" stool.  weight gain.  hx of a colonic polyp-repeat colonoscopy advised, which has not been accomplished.  AB/plevic ultrasound w/o acute findings.  TVUS nml appearing uterus but ovaries not visualized.  will obtain CT AB/pelvis, r/o colonic or adenexal/ovarian malignancy.  GI consult, Dr. Bang.    new onset DM.  exacerbated by prednisone.  ADA.  A1C 7.0%.  FSBG reviewed.  c/w correction insulin coverage.  plan to discharge on oral hypoglycemic.    hx RA.  c/w prednisone and methyltrexate.    hx hypothyroidism.  TSH, nml.  c/w levothyroxin.    disposition.  d/c telemetry and transfer to [facility].

## 2017-04-19 DIAGNOSIS — K59.09 OTHER CONSTIPATION: ICD-10-CM

## 2017-04-19 PROCEDURE — 74177 CT ABD & PELVIS W/CONTRAST: CPT | Mod: 26

## 2017-04-19 RX ORDER — FUROSEMIDE 40 MG
40 TABLET ORAL ONCE
Qty: 0 | Refills: 0 | Status: COMPLETED | OUTPATIENT
Start: 2017-04-19 | End: 2017-04-19

## 2017-04-19 RX ORDER — SPIRONOLACTONE 25 MG/1
25 TABLET, FILM COATED ORAL DAILY
Qty: 0 | Refills: 0 | Status: DISCONTINUED | OUTPATIENT
Start: 2017-04-19 | End: 2017-04-22

## 2017-04-19 RX ORDER — POLYETHYLENE GLYCOL 3350 17 G/17G
17 POWDER, FOR SOLUTION ORAL DAILY
Qty: 0 | Refills: 0 | Status: DISCONTINUED | OUTPATIENT
Start: 2017-04-19 | End: 2017-04-22

## 2017-04-19 RX ADMIN — SPIRONOLACTONE 25 MILLIGRAM(S): 25 TABLET, FILM COATED ORAL at 12:21

## 2017-04-19 RX ADMIN — Medication 10 MILLIGRAM(S): at 06:18

## 2017-04-19 RX ADMIN — HEPARIN SODIUM 5000 UNIT(S): 5000 INJECTION INTRAVENOUS; SUBCUTANEOUS at 06:19

## 2017-04-19 RX ADMIN — Medication 0.12 MILLIGRAM(S): at 06:18

## 2017-04-19 RX ADMIN — Medication 40 MILLIGRAM(S): at 12:23

## 2017-04-19 RX ADMIN — PREGABALIN 1000 MICROGRAM(S): 225 CAPSULE ORAL at 12:29

## 2017-04-19 RX ADMIN — Medication 25 MICROGRAM(S): at 06:19

## 2017-04-19 RX ADMIN — AMLODIPINE BESYLATE 5 MILLIGRAM(S): 2.5 TABLET ORAL at 06:19

## 2017-04-19 RX ADMIN — Medication 2: at 09:05

## 2017-04-19 RX ADMIN — Medication 1000 UNIT(S): at 12:22

## 2017-04-19 RX ADMIN — Medication 100 MILLIGRAM(S): at 06:18

## 2017-04-19 RX ADMIN — Medication 81 MILLIGRAM(S): at 12:21

## 2017-04-19 RX ADMIN — HEPARIN SODIUM 5000 UNIT(S): 5000 INJECTION INTRAVENOUS; SUBCUTANEOUS at 21:41

## 2017-04-19 RX ADMIN — HEPARIN SODIUM 5000 UNIT(S): 5000 INJECTION INTRAVENOUS; SUBCUTANEOUS at 15:24

## 2017-04-19 RX ADMIN — Medication 3 MILLIGRAM(S): at 12:22

## 2017-04-19 RX ADMIN — Medication 2: at 18:11

## 2017-04-19 NOTE — PROGRESS NOTE ADULT - ASSESSMENT
Acute on chronic decompensated HFpEF and RV systolic HF- continue diuresis with iv lasix . Check labs from today.  Strict I/O and daily wt checks. Low sodium diet. Nutrition education.    Close monitoring of electrolytes and renal function with diuresis.  Awaiting CT scan for abdominal distention.    HTN- continue current meds.  Hyperlipidemia- contine oupt meds.  Other medical issues- RA - Management pre primary team.    Thank you for allowing me to participate in the care of this patient. Please feel free to contact me with any questions.

## 2017-04-19 NOTE — CONSULT NOTE ADULT - SUBJECTIVE AND OBJECTIVE BOX
HPI:Abdominal distention and constipation-  88F.  c/o shortness of breath.  onset 2 weeks ago.  a/w 15 pound weight gain over 3 weeks.  (+) fatigue.  also notes she has distention of her abdomen.      when well, patient able to drive, shop and walk over short distances.  as of late, patient has observed a decrease in capacity for these activities.    in ED, found to have an elevated D-dimer.  b/l LE venous dopplers and CTA negative for VTE.  of note, CTA also negative for findings c/w acute HF.      serum glucose >200 and urine analysis 1g of glucose.  patient denies hx of DM.  (+) polyuria, but also reports a hx of recurrent UTIs.    PMHx:  pHTN;  HTN;  hyperlipidemia;  HFpEF;  ICH (2015, taken off warfarin at that time);  AF (ASA);  RA (chronic prednisone);  recurrent UTIs;  hypothyroidism.    PSHx:  hip replacement.    ALL:  PCN;  sulfa    SocHx:  community dweller.  private residence.  lives alone.  drives locally.      FHx:  NC. (17 Apr 2017 11:58)      PAST MEDICAL & SURGICAL HISTORY:      MEDICATIONS  (STANDING):  heparin  Injectable 5000Unit(s) SubCutaneous every 8 hours  docusate sodium 100milliGRAM(s) Oral three times a day  insulin lispro (HumaLOG) corrective regimen sliding scale  SubCutaneous three times a day before meals  insulin lispro (HumaLOG) corrective regimen sliding scale  SubCutaneous at bedtime  dextrose 5%. 1000milliLiter(s) IV Continuous <Continuous>  dextrose 50% Injectable 12.5Gram(s) IV Push once  dextrose 50% Injectable 25Gram(s) IV Push once  dextrose 50% Injectable 25Gram(s) IV Push once  predniSONE   Tablet 10milliGRAM(s) Oral daily  aspirin enteric coated 81milliGRAM(s) Oral daily  cyanocobalamin 1000MICROGram(s) Oral daily  cholecalciferol 1000Unit(s) Oral daily  digoxin     Tablet 0.125milliGRAM(s) Oral daily  amLODIPine   Tablet 5milliGRAM(s) Oral daily  folic acid 3milliGRAM(s) Oral daily  levothyroxine 25MICROGram(s) Oral daily  furosemide   Injectable 40milliGRAM(s) IV Push once  spironolactone 25milliGRAM(s) Oral daily    MEDICATIONS  (PRN):  senna 2Tablet(s) Oral at bedtime PRN Constipation  dextrose Gel 1Dose(s) Oral once PRN Blood Glucose LESS THAN 70 milliGRAM(s)/deciliter  glucagon  Injectable 1milliGRAM(s) IntraMuscular once PRN Glucose LESS THAN 70 milligrams/deciliter      Allergies    penicillins (Unknown)  sulfa drugs (Unknown)    Intolerances        SOCIAL HISTORY:    FAMILY HISTORY:   Non-contributory    REVIEW OF SYSTEMS      General:	    Respiratory and Thorax:  	  Cardiovascular:	  Abdominal distenstion   Gastrointestinal:	    Musculoskeletal:	   Vital Signs Last 24 Hrs  T(C): 36.7, Max: 36.7 (04-19 @ 10:04)  T(F): 98.1, Max: 98.1 (04-19 @ 10:04)  HR: 64 (61 - 70)  BP: 122/52 (122/52 - 134/64)  BP(mean): --  RR: 17 (16 - 18)  SpO2: 100% (98% - 100%)    HEENT :No Pallor.No icterus. EOMI,PERLAA  Chest : Clear to Auscultation  CVS : S1S2 Normal.No murmurs.  Abdomen: Soft.distended,Non tender .Normal bowel sounds.No Organomegaly.  CNS: Alert.Oriented to Time,Place and Person.No focal deficit.  EXT: Normal Range of motion.No pitting edema.    LABS:                        12.8   6.8   )-----------( 257      ( 18 Apr 2017 06:23 )             37.7     04-18    140  |  104  |  21  ----------------------------<  112<H>  4.2   |  28  |  0.67    Ca    9.0      18 Apr 2017 06:23            RADIOLOGY & ADDITIONAL STUDIES:

## 2017-04-19 NOTE — PROGRESS NOTE ADULT - SUBJECTIVE AND OBJECTIVE BOX
Patient is a 88y old  Female who presents with a chief complaint of shortness of breath. (2017 11:58)      HPI:  88F.  c/o shortness of breath.  onset 2 weeks ago.  a/w 15 pound weight gain over 3 weeks.  (+) fatigue.  also notes she has distention of her abdomen.      when well, patient able to drive, shop and walk over short distances.  as of late, patient has observed a decrease in capacity for these activities.    in ED, found to have an elevated D-dimer.  b/l LE venous dopplers and CTA negative for VTE.  of note, CTA also negative for findings c/w acute HF.      serum glucose >200 and urine analysis 1g of glucose.  patient denies hx of DM.  (+) polyuria, but also reports a hx of recurrent UTIs.    Pt was noted to be in decompensated HFpEF and RV failure and was being diuresed through the hospital course.  - Pt seen and examined by me today, she denies any SOB at rest. She is not ambulating and using bedside commode.    PMHx:  pHTN;  HTN;  hyperlipidemia;  HFpEF;  ICH (, taken off warfarin at that time);  AF (ASA);  RA (chronic prednisone);  recurrent UTIs;  hypothyroidism.    PSHx:  hip replacement.    ALL:  PCN;  sulfa    SocHx:  community dweller.  private residence.  lives alone.  drives locally.      FHx:  NC. (2017 11:58)      PAST MEDICAL & SURGICAL HISTORY:      MEDICATIONS  (STANDING):  heparin  Injectable 5000Unit(s) SubCutaneous every 8 hours  docusate sodium 100milliGRAM(s) Oral three times a day  insulin lispro (HumaLOG) corrective regimen sliding scale  SubCutaneous three times a day before meals  insulin lispro (HumaLOG) corrective regimen sliding scale  SubCutaneous at bedtime  dextrose 5%. 1000milliLiter(s) IV Continuous <Continuous>  dextrose 50% Injectable 12.5Gram(s) IV Push once  dextrose 50% Injectable 25Gram(s) IV Push once  dextrose 50% Injectable 25Gram(s) IV Push once  predniSONE   Tablet 10milliGRAM(s) Oral daily  aspirin enteric coated 81milliGRAM(s) Oral daily  levothyroxine 25MICROGram(s) Oral daily  folic acid 1milliGRAM(s) Oral daily  cyanocobalamin 1000MICROGram(s) Oral daily  cholecalciferol 1000Unit(s) Oral daily  potassium chloride    Tablet ER 40milliEquivalent(s) Oral every 4 hours  furosemide   Injectable 40milliGRAM(s) IV Push once    MEDICATIONS  (PRN):  senna 2Tablet(s) Oral at bedtime PRN Constipation  dextrose Gel 1Dose(s) Oral once PRN Blood Glucose LESS THAN 70 milliGRAM(s)/deciliter  glucagon  Injectable 1milliGRAM(s) IntraMuscular once PRN Glucose LESS THAN 70 milligrams/deciliter      FAMILY HISTORY:      SOCIAL HISTORY:    REVIEW OF SYSTEMS:  CONSTITUTIONAL:  No night sweats.  No fatigue, malaise, lethargy.  No fever or chills.  HEENT:  Eyes:  No visual changes.  No eye pain.      ENT:  No runny nose.  No epistaxis.  No sinus pain.  No sore throat.  No odynophagia.  No ear pain.  No congestion.  RESPIRATORY: c/o  shortness of breath.  CARDIOVASCULAR:  No chest pains.  No palpitations. c/o shortness of breath, No orthopnea or PND.  GASTROINTESTINAL:  No abdominal pain.  No nausea or vomiting.  No diarrhea or constipation.  No hematemesis.  No hematochezia.  No melena.  GENITOURINARY:  No urgency.  No frequency.  No dysuria.  No hematuria.  No obstructive symptoms.  No discharge.  No pain.  No significant abnormal bleeding.  MUSCULOSKELETAL:  No musculoskeletal pain.  No joint swelling.  No arthritis.  NEUROLOGICAL:  No tingling or numbness or weakness.  PSYCHIATRIC:  No confusion  SKIN:  No rashes.  No lesions.  No wounds.  ENDOCRINE:  No unexplained weight loss.  No polydipsia.  No polyuria.  No polyphagia.  HEMATOLOGIC:  No anemia.  No purpura.  No petechiae.  No prolonged or excessive bleeding.   ALLERGIC AND IMMUNOLOGIC:  No pruritus.  No swelling.         Vital Signs Last 24 Hrs  T(C): 36.3, Max: 37.1 (04-16 @ 18:43)  T(F): 97.4, Max: 98.7 (04-16 @ 18:43)  HR: 70 (59 - 99)  BP: 126/64 (126/64 - 143/72)  BP(mean): 81 (81 - 81)  RR: 18 (15 - 18)  SpO2: 100% (95% - 100%)    PHYSICAL EXAM-    Constitutional: The patient appears to be normal, well developed, well nourished and alert and oriented to time, place and person. The patient does not appear acutely ill.     Head: Head is normocephalic and atraumatic.      Neck: The patient's neck is supple without enlargement, has no palpable thyromegaly nor thyroid nodules and has no jugular venous distention. No audible carotid bruits. There are strong carotid pulses bilaterally. No JVD.     Cardiovascular: Regular rate and rhythm without S3, S4. No murmurs or rubs are appreciated.      Respiratory: CTA b/l    Abdomen: Soft, nontender, nondistended with positive bowel sounds.      Extremity: pitting pedal edema 1+ b/l   Neurologic: The patient is alert and oriented.      Skin: No rash, no obvious lesions noted.      Psychiatric: The patient appears to be emotionally stable.      INTERPRETATION OF TELEMETRY: off tele now    ECG: sinus rythm, Qwaves in inferior leads.     I&O's Detail      LABS:                        13.3   6.6   )-----------( 311      ( 2017 13:35 )             40.7         142  |  105  |  15  ----------------------------<  105<H>  3.6   |  29  |  0.77    Ca    9.4      2017 13:35  Mg     2.2         TPro  6.6  /  Alb  3.5  /  TBili  1.1  /  DBili  x   /  AST  15  /  ALT  18  /  AlkPhos  52      CARDIAC MARKERS ( 2017 23:56 )  <0.015 ng/mL / x     / x     / x     / x      CARDIAC MARKERS ( 2017 19:20 )  <0.015 ng/mL / x     / x     / x     / x            Urinalysis Basic - ( 2017 20:16 )    Color: Yellow / Appearance: Clear / S.005 / pH: x  Gluc: x / Ketone: Negative  / Bili: Negative / Urobili: Negative mg/dL   Blood: x / Protein: Negative mg/dL / Nitrite: Negative   Leuk Esterase: Negative / RBC: 3-5 /HPF / WBC 0-2   Sq Epi: x / Non Sq Epi: Few / Bacteria: Few      I&O's Summary    BNPSerum Pro-Brain Natriuretic Peptide: 168 pg/mL ( @ 19:38)    RADIOLOGY & ADDITIONAL STUDIES:

## 2017-04-19 NOTE — PROGRESS NOTE ADULT - SUBJECTIVE AND OBJECTIVE BOX
TD  04/19:  3N.  no complaints.  ambulating.    MEDICATIONS  (STANDING):  heparin  Injectable 5000Unit(s) SubCutaneous every 8 hours  docusate sodium 100milliGRAM(s) Oral three times a day  insulin lispro (HumaLOG) corrective regimen sliding scale  SubCutaneous three times a day before meals  insulin lispro (HumaLOG) corrective regimen sliding scale  SubCutaneous at bedtime  dextrose 5%. 1000milliLiter(s) IV Continuous <Continuous>  dextrose 50% Injectable 12.5Gram(s) IV Push once  dextrose 50% Injectable 25Gram(s) IV Push once  dextrose 50% Injectable 25Gram(s) IV Push once  predniSONE   Tablet 10milliGRAM(s) Oral daily  aspirin enteric coated 81milliGRAM(s) Oral daily  cyanocobalamin 1000MICROGram(s) Oral daily  cholecalciferol 1000Unit(s) Oral daily  digoxin     Tablet 0.125milliGRAM(s) Oral daily  amLODIPine   Tablet 5milliGRAM(s) Oral daily  folic acid 3milliGRAM(s) Oral daily  levothyroxine 25MICROGram(s) Oral daily  spironolactone 25milliGRAM(s) Oral daily  polyethylene glycol 3350 17Gram(s) Oral daily    MEDICATIONS  (PRN):  senna 2Tablet(s) Oral at bedtime PRN Constipation  dextrose Gel 1Dose(s) Oral once PRN Blood Glucose LESS THAN 70 milliGRAM(s)/deciliter  glucagon  Injectable 1milliGRAM(s) IntraMuscular once PRN Glucose LESS THAN 70 milligrams/deciliter    Vital Signs Last 24 Hrs  T(C): 36.7, Max: 36.7 (04-19 @ 10:04)  T(F): 98, Max: 98.1 (04-19 @ 10:04)  HR: 89 (63 - 89)  BP: 120/64 (120/64 - 134/64)  BP(mean): --  RR: 18 (16 - 18)  SpO2: 100% (98% - 100%)    appears well today.  comfortable.  no respiratory distress.  lungs clear.  no WRR.  heart RRR.  NS1S2.  distant heart sounds.  abdomen distended.  soft and nontender.  no pedal edema.  A&Ox3.                        12.8   6.8   )-----------( 257      ( 18 Apr 2017 06:23 )             37.7       04-18    140  |  104  |  21  ----------------------------<  112<H>  4.2   |  28  |  0.67    Ca    9.0      18 Apr 2017 06:23

## 2017-04-20 ENCOUNTER — TRANSCRIPTION ENCOUNTER (OUTPATIENT)
Age: 82
End: 2017-04-20

## 2017-04-20 ENCOUNTER — RESULT REVIEW (OUTPATIENT)
Age: 82
End: 2017-04-20

## 2017-04-20 LAB
ANION GAP SERPL CALC-SCNC: 10 MMOL/L — SIGNIFICANT CHANGE UP (ref 5–17)
BUN SERPL-MCNC: 17 MG/DL — SIGNIFICANT CHANGE UP (ref 7–23)
CALCIUM SERPL-MCNC: 9.1 MG/DL — SIGNIFICANT CHANGE UP (ref 8.5–10.1)
CHLORIDE SERPL-SCNC: 100 MMOL/L — SIGNIFICANT CHANGE UP (ref 96–108)
CO2 SERPL-SCNC: 30 MMOL/L — SIGNIFICANT CHANGE UP (ref 22–31)
CREAT SERPL-MCNC: 0.81 MG/DL — SIGNIFICANT CHANGE UP (ref 0.5–1.3)
GLUCOSE SERPL-MCNC: 123 MG/DL — HIGH (ref 70–99)
HCT VFR BLD CALC: 40.8 % — SIGNIFICANT CHANGE UP (ref 34.5–45)
HGB BLD-MCNC: 13.2 G/DL — SIGNIFICANT CHANGE UP (ref 11.5–15.5)
MCHC RBC-ENTMCNC: 32 PG — SIGNIFICANT CHANGE UP (ref 27–34)
MCHC RBC-ENTMCNC: 32.4 GM/DL — SIGNIFICANT CHANGE UP (ref 32–36)
MCV RBC AUTO: 98.8 FL — SIGNIFICANT CHANGE UP (ref 80–100)
PLATELET # BLD AUTO: 361 K/UL — SIGNIFICANT CHANGE UP (ref 150–400)
POTASSIUM SERPL-MCNC: 3.3 MMOL/L — LOW (ref 3.5–5.3)
POTASSIUM SERPL-SCNC: 3.3 MMOL/L — LOW (ref 3.5–5.3)
RBC # BLD: 4.13 M/UL — SIGNIFICANT CHANGE UP (ref 3.8–5.2)
RBC # FLD: 16.4 % — HIGH (ref 10.3–14.5)
SODIUM SERPL-SCNC: 140 MMOL/L — SIGNIFICANT CHANGE UP (ref 135–145)
WBC # BLD: 8.7 K/UL — SIGNIFICANT CHANGE UP (ref 3.8–10.5)
WBC # FLD AUTO: 8.7 K/UL — SIGNIFICANT CHANGE UP (ref 3.8–10.5)

## 2017-04-20 RX ORDER — CHOLECALCIFEROL (VITAMIN D3) 125 MCG
5000 CAPSULE ORAL
Qty: 0 | Refills: 0 | COMMUNITY
Start: 2017-04-20

## 2017-04-20 RX ORDER — CHOLECALCIFEROL (VITAMIN D3) 125 MCG
1000 CAPSULE ORAL
Qty: 0 | Refills: 0 | COMMUNITY
Start: 2017-04-20

## 2017-04-20 RX ORDER — SPIRONOLACTONE 25 MG/1
1 TABLET, FILM COATED ORAL
Qty: 14 | Refills: 0 | OUTPATIENT
Start: 2017-04-20 | End: 2017-05-04

## 2017-04-20 RX ORDER — ASPIRIN/CALCIUM CARB/MAGNESIUM 324 MG
1 TABLET ORAL
Qty: 0 | Refills: 0 | COMMUNITY
Start: 2017-04-20

## 2017-04-20 RX ORDER — SOD SULF/SODIUM/NAHCO3/KCL/PEG
1 SOLUTION, RECONSTITUTED, ORAL ORAL ONCE
Qty: 0 | Refills: 0 | Status: COMPLETED | OUTPATIENT
Start: 2017-04-20 | End: 2017-04-20

## 2017-04-20 RX ORDER — SOD SULF/SODIUM/NAHCO3/KCL/PEG
SOLUTION, RECONSTITUTED, ORAL ORAL
Qty: 0 | Refills: 0 | Status: COMPLETED | OUTPATIENT
Start: 2017-04-21 | End: 2017-04-21

## 2017-04-20 RX ORDER — SOD SULF/SODIUM/NAHCO3/KCL/PEG
1 SOLUTION, RECONSTITUTED, ORAL ORAL ONCE
Qty: 0 | Refills: 0 | Status: COMPLETED | OUTPATIENT
Start: 2017-04-20 | End: 2017-04-21

## 2017-04-20 RX ORDER — PREGABALIN 225 MG/1
2 CAPSULE ORAL
Qty: 0 | Refills: 0 | COMMUNITY
Start: 2017-04-20

## 2017-04-20 RX ORDER — PREGABALIN 225 MG/1
1 CAPSULE ORAL
Qty: 0 | Refills: 0 | COMMUNITY
Start: 2017-04-20

## 2017-04-20 RX ORDER — POTASSIUM CHLORIDE 20 MEQ
40 PACKET (EA) ORAL ONCE
Qty: 0 | Refills: 0 | Status: COMPLETED | OUTPATIENT
Start: 2017-04-20 | End: 2017-04-20

## 2017-04-20 RX ORDER — FUROSEMIDE 40 MG
40 TABLET ORAL DAILY
Qty: 0 | Refills: 0 | Status: DISCONTINUED | OUTPATIENT
Start: 2017-04-20 | End: 2017-04-22

## 2017-04-20 RX ADMIN — SPIRONOLACTONE 25 MILLIGRAM(S): 25 TABLET, FILM COATED ORAL at 06:59

## 2017-04-20 RX ADMIN — Medication 2: at 17:18

## 2017-04-20 RX ADMIN — Medication 100 MILLIGRAM(S): at 06:58

## 2017-04-20 RX ADMIN — PREGABALIN 1000 MICROGRAM(S): 225 CAPSULE ORAL at 12:04

## 2017-04-20 RX ADMIN — Medication 25 MICROGRAM(S): at 06:57

## 2017-04-20 RX ADMIN — HEPARIN SODIUM 5000 UNIT(S): 5000 INJECTION INTRAVENOUS; SUBCUTANEOUS at 06:59

## 2017-04-20 RX ADMIN — AMLODIPINE BESYLATE 5 MILLIGRAM(S): 2.5 TABLET ORAL at 06:58

## 2017-04-20 RX ADMIN — Medication 40 MILLIGRAM(S): at 12:05

## 2017-04-20 RX ADMIN — Medication 81 MILLIGRAM(S): at 12:06

## 2017-04-20 RX ADMIN — Medication 10 MILLIGRAM(S): at 06:59

## 2017-04-20 RX ADMIN — Medication 1000 UNIT(S): at 12:05

## 2017-04-20 RX ADMIN — Medication 1 LITER(S): at 17:33

## 2017-04-20 RX ADMIN — Medication 0.12 MILLIGRAM(S): at 06:58

## 2017-04-20 RX ADMIN — Medication 40 MILLIEQUIVALENT(S): at 12:05

## 2017-04-20 RX ADMIN — Medication 3 MILLIGRAM(S): at 12:04

## 2017-04-20 RX ADMIN — HEPARIN SODIUM 5000 UNIT(S): 5000 INJECTION INTRAVENOUS; SUBCUTANEOUS at 21:07

## 2017-04-20 RX ADMIN — HEPARIN SODIUM 5000 UNIT(S): 5000 INJECTION INTRAVENOUS; SUBCUTANEOUS at 15:36

## 2017-04-20 NOTE — DISCHARGE NOTE ADULT - HOSPITAL COURSE
88F.  c/o shortness of breath.  onset 2 weeks ago.  a/w 15 pound weight gain over 3 weeks.  (+) fatigue.  also notes she has distention of her abdomen.      when well, patient able to drive, shop and walk over short distances.  as of late, patient has observed a decrease in capacity for these activities.    in ED, found to have an elevated D-dimer.  b/l LE venous dopplers and CTA negative for VTE.  of note, CTA also negative for findings c/w acute HF.      serum glucose >200 and urine analysis 1g of glucose.  patient denies hx of DM.  (+) polyuria, but also reports a hx of recurrent UTIs.    PMHx:  pHTN;  HTN;  hyperlipidemia;  HFpEF;  ICH (2015, taken off warfarin at that time);  AF (ASA);  RA (chronic prednisone);  recurrent UTIs;  hypothyroidism.    PSHx:  hip replacement.    ALL:  PCN;  sulfa    SocHx:  community dweller.  private residence.  lives alone.  drives locally.      FHx:  NC. (17 Apr 2017 11:58)    TD  04/20:  3N.  son at bedside.  patient w/o complaints.  SOB resolved.  breathing comfortably.  no chest pain.    MEDICATIONS  (STANDING):  heparin  Injectable 5000Unit(s) SubCutaneous every 8 hours  docusate sodium 100milliGRAM(s) Oral three times a day  insulin lispro (HumaLOG) corrective regimen sliding scale  SubCutaneous three times a day before meals  insulin lispro (HumaLOG) corrective regimen sliding scale  SubCutaneous at bedtime  dextrose 5%. 1000milliLiter(s) IV Continuous <Continuous>  dextrose 50% Injectable 12.5Gram(s) IV Push once  dextrose 50% Injectable 25Gram(s) IV Push once  dextrose 50% Injectable 25Gram(s) IV Push once  predniSONE   Tablet 10milliGRAM(s) Oral daily  aspirin enteric coated 81milliGRAM(s) Oral daily  cyanocobalamin 1000MICROGram(s) Oral daily  cholecalciferol 1000Unit(s) Oral daily  digoxin     Tablet 0.125milliGRAM(s) Oral daily  amLODIPine   Tablet 5milliGRAM(s) Oral daily  folic acid 3milliGRAM(s) Oral daily  levothyroxine 25MICROGram(s) Oral daily  spironolactone 25milliGRAM(s) Oral daily  polyethylene glycol 3350 17Gram(s) Oral daily  furosemide    Tablet 40milliGRAM(s) Oral daily  polyethylene glycol/electrolyte Solution 1Liter(s) Oral once  polyethylene glycol/electrolyte Solution     polyethylene glycol/electrolyte Solution 1Liter(s) Oral once    MEDICATIONS  (PRN):  senna 2Tablet(s) Oral at bedtime PRN Constipation  dextrose Gel 1Dose(s) Oral once PRN Blood Glucose LESS THAN 70 milliGRAM(s)/deciliter  glucagon  Injectable 1milliGRAM(s) IntraMuscular once PRN Glucose LESS THAN 70 milligrams/deciliter    Vital Signs Last 24 Hrs  T(C): 37, Max: 37.1 (04-19 @ 19:10)  T(F): 98.6, Max: 98.7 (04-19 @ 19:10)  HR: 92 (60 - 92)  BP: 122/74 (120/64 - 138/58)  BP(mean): --  RR: 18 (18 - 18)  SpO2: 97% (97% - 100%)    NAD  appears well today.  comfortable.  no respiratory distress.  lungs clear.  no WRR.  heart RRR.  NS1S2.  distant heart sounds.  abdomen distended.  soft and nontender.  no pedal edema.  A&Ox3.                        13.2   8.7   )-----------( 361      ( 20 Apr 2017 06:38 )             40.8       04-20    140  |  100  |  17  ----------------------------<  123<H>  3.3<L>   |  30  |  0.81    Ca    9.1      20 Apr 2017 06:38    88F.  c/o shortness of breath.  onset 2 weeks ago.  a/w 15 pound weight gain over 3 weeks.  (+) fatigue.  also notes she has distention of her abdomen.  serum glucose >200 and urine analysis 1g of glucose.  patient denies hx of DM.  (+) polyuria, but also reports a hx of recurrent UTIs.    dyspnea and mild peripheral edema-both improved after Lasix.  hx HFpEF and pHTN.  BNP nml and CTA w/o evidence of acute HF or PE.  TnI negative.  telemetry reviewed.  NSR 80-90.  TTE, nml LVEF and RV structure and function.  c/w Lasix 20mg po qd.  Cardiology f/u outpatient.    abdominal distention.  "pebble" stool.  weight gain.  hx of a colonic polyp-repeat colonoscopy advised, which has not been accomplished.  AB/plevic ultrasound w/o acute findings.  TVUS nml appearing uterus but ovaries not visualized.  CT AB/pelvis, w/o significant findings.  GI f/u outpatient w/ Dr. Bang.    new onset DM.  exacerbated by prednisone.  ADA.  A1C 7.0%.  FSBG reviewed.  DM education prior to discharge.  would recommend continuation of ADA diet as well as therapeutic lifestyle and reversable risk factor modifications for now.  repeat A1C in 3 months and consider adding metformin if indicated.  PMD f/u outpatient to further discuss within 4-5 days.    hx RA.  c/w [id number] po qd (patient states this is her new does that was adjusted recently by her rheumatologist) and methyltrexate.  rheumatology f/u outpatient.    hx hypothyroidism.  TSH, nml.  c/w levothyroxin.    disposition.  discharge home.    message left for Dr. Toya Greenfield PMD. 88F.  c/o shortness of breath.  onset 2 weeks ago.  a/w 15 pound weight gain over 3 weeks.  (+) fatigue.  also notes she has distention of her abdomen.      when well, patient able to drive, shop and walk over short distances.  as of late, patient has observed a decrease in capacity for these activities.    in ED, found to have an elevated D-dimer.  b/l LE venous dopplers and CTA negative for VTE.  of note, CTA also negative for findings c/w acute HF.      serum glucose >200 and urine analysis 1g of glucose.  patient denies hx of DM.  (+) polyuria, but also reports a hx of recurrent UTIs.    PMHx:  pHTN;  HTN;  hyperlipidemia;  HFpEF;  ICH (2015, taken off warfarin at that time);  AF (ASA);  RA (chronic prednisone);  recurrent UTIs;  hypothyroidism.    PSHx:  hip replacement.    ALL:  PCN;  sulfa    SocHx:  community dweller.  private residence.  lives alone.  drives locally.      FHx:  NC. (17 Apr 2017 11:58)    TD  04/20:  3N.  son at bedside.  patient w/o complaints.  SOB resolved.  breathing comfortably.  no chest pain.    Vital Signs Last 24 Hrs  T(C): 37, Max: 37.1 (04-19 @ 19:10)  T(F): 98.6, Max: 98.7 (04-19 @ 19:10)  HR: 92 (60 - 92)  BP: 122/74 (120/64 - 138/58)  BP(mean): --  RR: 18 (18 - 18)  SpO2: 97% (97% - 100%)    NAD  appears well today.  comfortable.  no respiratory distress.  lungs clear.  no WRR.  heart RRR.  NS1S2.  distant heart sounds.  abdomen distended.  soft and nontender.  no pedal edema.  A&Ox3.                        13.2   8.7   )-----------( 361      ( 20 Apr 2017 06:38 )             40.8       04-20    140  |  100  |  17  ----------------------------<  123<H>  3.3<L>   |  30  |  0.81    Ca    9.1      20 Apr 2017 06:38    88F.  c/o shortness of breath.  onset 2 weeks ago.  a/w 15 pound weight gain over 3 weeks.  (+) fatigue.  also notes she has distention of her abdomen.  serum glucose >200 and urine analysis 1g of glucose.  patient denies hx of DM.  (+) polyuria, but also reports a hx of recurrent UTIs.    dyspnea and mild peripheral edema-both improved after Lasix.  hx HFpEF and pHTN.  BNP nml and CTA w/o evidence of acute HF or PE.  TnI negative.  telemetry reviewed.  NSR 80-90.  TTE, nml LVEF and RV structure and function.  c/w Lasix 20mg po qd.  Cardiology f/u outpatient.    abdominal distention.  "pebble" stool.  weight gain.  hx of a colonic polyp-repeat colonoscopy advised, which has not been accomplished.  AB/plevic ultrasound w/o acute findings.  TVUS nml appearing uterus but ovaries not visualized.  CT AB/pelvis, w/o significant findings.  GI f/u outpatient w/ Dr. Bang.    new onset DM.  exacerbated by prednisone.  ADA.  A1C 7.0%.  FSBG reviewed.  DM education prior to discharge.  would recommend continuation of ADA diet as well as therapeutic lifestyle and reversable risk factor modifications for now.  repeat A1C in 3 months and consider adding metformin if indicated.  PMD f/u outpatient to further discuss within 4-5 days.    hx RA.  c/w [id number] po qd (patient states this is her new does that was adjusted recently by her rheumatologist) and methyltrexate.  rheumatology f/u outpatient.    hx hypothyroidism.  TSH, nml.  c/w levothyroxin.    disposition.  discharge home.    case d/w Dr. Toya Greenfield PMD.    case d/w Dr. Bang GI.

## 2017-04-20 NOTE — DISCHARGE NOTE ADULT - MEDICATION SUMMARY - MEDICATIONS TO TAKE
I will START or STAY ON the medications listed below when I get home from the hospital:    predniSONE 5 mg oral tablet  -- 10 milligram(s) by mouth once a day  -- Indication: For RA    spironolactone 25 mg oral tablet  -- 1 tab(s) by mouth once a day  -- Indication: For CHF (congestive heart failure)    aspirin 81 mg oral delayed release tablet  -- 1 tab(s) by mouth once a day  -- Indication: For primary prophylaxis    digoxin 125 mcg (0.125 mg) oral tablet  -- 1 tab(s) by mouth once a day  -- Indication: For Afib    methotrexate 2.5 mg oral tablet  -- 8 tab(s) by mouth once a week  -- Indication: For RA    Norvasc 5 mg oral tablet  -- 1 tab(s) by mouth once a day  -- Indication: For hypertension    Lasix 20 mg oral tablet  -- 1 tab(s) by mouth once a day  -- Indication: For CHF (congestive heart failure)    potassium chloride 10 mEq oral tablet, extended release  -- 10 milliequivalent(s) by mouth once a day  -- Indication: For Supplement    Synthroid 125 mcg (0.125 mg) oral tablet  -- 1 tab(s) by mouth once a day  -- Indication: For hypothyroidism    folic acid 1 mg oral tablet  -- 3 milligram(s) by mouth once a day  -- Indication: For Suppelement    cholecalciferol oral tablet  -- 1000 unit(s) by mouth once a day  -- Indication: For Suppelement    cyanocobalamin 1000 mcg oral tablet  -- 1 tab(s) by mouth once a day  -- Indication: For Supplement I will START or STAY ON the medications listed below when I get home from the hospital:    predniSONE 5 mg oral tablet  -- 10 milligram(s) by mouth once a day  -- Indication: For RA    spironolactone 25 mg oral tablet  -- 1 tab(s) by mouth once a day  -- Indication: For CHF (congestive heart failure)    digoxin 125 mcg (0.125 mg) oral tablet  -- 1 tab(s) by mouth once a day  -- Indication: For Afib    methotrexate 2.5 mg oral tablet  -- 8 tab(s) by mouth once a week  -- Indication: For RA    Norvasc 5 mg oral tablet  -- 1 tab(s) by mouth once a day  -- Indication: For hypertension    Lasix 20 mg oral tablet  -- 1 tab(s) by mouth once a day  -- Indication: For CHF (congestive heart failure)    potassium chloride 10 mEq oral tablet, extended release  -- 10 milliequivalent(s) by mouth once a day  -- Indication: For Supplement    Synthroid 125 mcg (0.125 mg) oral tablet  -- 1 tab(s) by mouth once a day  -- Indication: For hypothyroidism    folic acid 1 mg oral tablet  -- 3 milligram(s) by mouth once a day  -- Indication: For Suppelement    cholecalciferol oral tablet  -- 1000 unit(s) by mouth once a day  -- Indication: For Suppelement    cyanocobalamin 1000 mcg oral tablet  -- 1 tab(s) by mouth once a day  -- Indication: For Supplement

## 2017-04-20 NOTE — DISCHARGE NOTE ADULT - PLAN OF CARE
see below follow up with Cardiology. see below. Hbg A1C 7.0%.  recommend ADA diet as well as therapeutic lifestyle modifications.  PMD follow up 4-5 days. MUST follow up with Dr. Bang in 2 weeks You had colonoscopy 4/21 with removal of rectal polyp.  As per Dr. Bang, you should not take aspirin or any NSAIDs for 7 days.  You must see Dr. Bang in office. follow up with Cardiology in 1 week

## 2017-04-20 NOTE — DISCHARGE NOTE ADULT - ABILITY TO HEAR (WITH HEARING AID OR HEARING APPLIANCE IF NORMALLY USED):
need hearing aides/Mildly to Moderately Impaired: difficulty hearing in some environments or speaker may need to increase volume or speak distinctly

## 2017-04-20 NOTE — DISCHARGE NOTE ADULT - CARE PROVIDER_API CALL
Eva Rivera (DAHIANA), Cardiovascular Disease; Internal Medicine  [address] [place] [address]  Phone: [phone]  Fax: [phone]    Jabier Bang), Gastroenterology  [address] [place] [address]  Phone: [phone]  Fax: [phone]    Toya Greenfield (DO), Family Medicine  [address] [place] [address]  Phone: [phone]  Fax: [phone]

## 2017-04-20 NOTE — DISCHARGE NOTE ADULT - CARE PLAN
Principal Discharge DX:	SOB (shortness of breath)  Goal:	see below  Instructions for follow-up, activity and diet:	follow up with Cardiology.  Secondary Diagnosis:	Diabetes  Goal:	see below.  Instructions for follow-up, activity and diet:	Hbg A1C 7.0%.  recommend ADA diet as well as therapeutic lifestyle modifications.  PMD follow up 4-5 days. Principal Discharge DX:	SOB (shortness of breath)  Goal:	see below  Instructions for follow-up, activity and diet:	follow up with Cardiology in 1 week  Secondary Diagnosis:	Diabetes  Goal:	see below.  Instructions for follow-up, activity and diet:	Hbg A1C 7.0%.  recommend ADA diet as well as therapeutic lifestyle modifications.  PMD follow up 4-5 days.  Goal:	MUST follow up with Dr. Bang in 2 weeks  Instructions for follow-up, activity and diet:	You had colonoscopy 4/21 with removal of rectal polyp.  As per Dr. Bang, you should not take aspirin or any NSAIDs for 7 days.  You must see Dr. Bang in office.

## 2017-04-20 NOTE — DISCHARGE NOTE ADULT - PATIENT PORTAL LINK FT
“You can access the [facility] Patient Portal, offered by [facility], by registering with the following website: [email or web address]”

## 2017-04-20 NOTE — DISCHARGE NOTE ADULT - MEDICATION SUMMARY - MEDICATIONS TO CHANGE
I will SWITCH the dose or number of times a day I take the medications listed below when I get home from the hospital:  None I will SWITCH the dose or number of times a day I take the medications listed below when I get home from the hospital:    predniSONE 5 mg oral tablet  -- 20 milligram(s) by mouth once a day

## 2017-04-21 LAB — DIGOXIN SERPL-MCNC: 0.66 NG/ML — LOW (ref 0.8–2)

## 2017-04-21 PROCEDURE — 88305 TISSUE EXAM BY PATHOLOGIST: CPT | Mod: 26

## 2017-04-21 RX ORDER — POTASSIUM CHLORIDE 20 MEQ
20 PACKET (EA) ORAL ONCE
Qty: 0 | Refills: 0 | Status: COMPLETED | OUTPATIENT
Start: 2017-04-21 | End: 2017-04-21

## 2017-04-21 RX ADMIN — SPIRONOLACTONE 25 MILLIGRAM(S): 25 TABLET, FILM COATED ORAL at 06:19

## 2017-04-21 RX ADMIN — Medication 25 MICROGRAM(S): at 06:19

## 2017-04-21 RX ADMIN — PREGABALIN 1000 MICROGRAM(S): 225 CAPSULE ORAL at 17:14

## 2017-04-21 RX ADMIN — Medication 1 LITER(S): at 06:35

## 2017-04-21 RX ADMIN — Medication 20 MILLIEQUIVALENT(S): at 21:47

## 2017-04-21 RX ADMIN — Medication 0.12 MILLIGRAM(S): at 09:17

## 2017-04-21 RX ADMIN — Medication 40 MILLIGRAM(S): at 06:19

## 2017-04-21 RX ADMIN — Medication 1000 UNIT(S): at 17:15

## 2017-04-21 RX ADMIN — Medication 3 MILLIGRAM(S): at 17:15

## 2017-04-21 RX ADMIN — HEPARIN SODIUM 5000 UNIT(S): 5000 INJECTION INTRAVENOUS; SUBCUTANEOUS at 06:20

## 2017-04-21 RX ADMIN — AMLODIPINE BESYLATE 5 MILLIGRAM(S): 2.5 TABLET ORAL at 06:19

## 2017-04-21 RX ADMIN — Medication 10 MILLIGRAM(S): at 06:19

## 2017-04-21 NOTE — PROGRESS NOTE ADULT - ASSESSMENT
88F.  c/o shortness of breath.  onset 2 weeks ago.  a/w 15 pound weight gain over 3 weeks.  (+) fatigue.  also notes she has distention of her abdomen.  serum glucose >200 and urine analysis 1g of glucose.  patient denies hx of DM.  (+) polyuria, but also reports a hx of recurrent UTIs.    dyspnea and mild peripheral edema-both improved after Lasix.  hx HFpEF and pHTN.  BNP nml and CTA w/o evidence of acute HF or PE.  TnI negative.  telemetry reviewed.  NSR 80-90.  TTE, nml LVEF and RV structure and function.  c/w Lasix 20mg po qd.  Cardiology f/u outpatient.    abdominal distention.  "pebble" stool.  weight gain.  hx of a colonic polyp-repeat colonoscopy advised, which has not been accomplished.  AB/plevic ultrasound w/o acute findings.  TVUS nml appearing uterus but ovaries not visualized.  CT AB/pelvis, w/o significant findings.  s/p colonscopy today.  GI f/u outpatient w/ Dr. Bang.    new onset DM.  exacerbated by prednisone.  ADA.  A1C 7.0%.  FSBG reviewed.  DM education prior to discharge.  would recommend continuation of ADA diet as well as therapeutic lifestyle and reversable risk factor modifications for now.  repeat A1C in 3 months and consider adding metformin if indicated.  PMD f/u outpatient to further discuss within 4-5 days.    hx RA.  c/w [id number] po qd (patient states this is her new does that was adjusted recently by her rheumatologist) and methyltrexate.  rheumatology f/u outpatient.    hx hypothyroidism.  TSH, nml.  c/w levothyroxin.    disposition.  may discharge home.    case d/w Dr. Toya Greenfield PMD.

## 2017-04-21 NOTE — PROGRESS NOTE ADULT - SUBJECTIVE AND OBJECTIVE BOX
TD  04/21:  2S.  patient sitting up in bed enjoying her PM dinner tray.  offers no new complaints.    yesterday's discharge was deferred by GI, re:  decision to perform colonoscopy during this hospital admission.    MEDICATIONS  (STANDING):  docusate sodium 100milliGRAM(s) Oral three times a day  insulin lispro (HumaLOG) corrective regimen sliding scale  SubCutaneous three times a day before meals  insulin lispro (HumaLOG) corrective regimen sliding scale  SubCutaneous at bedtime  dextrose 5%. 1000milliLiter(s) IV Continuous <Continuous>  dextrose 50% Injectable 12.5Gram(s) IV Push once  dextrose 50% Injectable 25Gram(s) IV Push once  dextrose 50% Injectable 25Gram(s) IV Push once  predniSONE   Tablet 10milliGRAM(s) Oral daily  cyanocobalamin 1000MICROGram(s) Oral daily  cholecalciferol 1000Unit(s) Oral daily  digoxin     Tablet 0.125milliGRAM(s) Oral daily  amLODIPine   Tablet 5milliGRAM(s) Oral daily  folic acid 3milliGRAM(s) Oral daily  levothyroxine 25MICROGram(s) Oral daily  spironolactone 25milliGRAM(s) Oral daily  polyethylene glycol 3350 17Gram(s) Oral daily  furosemide    Tablet 40milliGRAM(s) Oral daily  potassium chloride    Tablet ER 20milliEquivalent(s) Oral once    MEDICATIONS  (PRN):  senna 2Tablet(s) Oral at bedtime PRN Constipation  dextrose Gel 1Dose(s) Oral once PRN Blood Glucose LESS THAN 70 milliGRAM(s)/deciliter  glucagon  Injectable 1milliGRAM(s) IntraMuscular once PRN Glucose LESS THAN 70 milligrams/deciliter    Vital Signs Last 24 Hrs  T(C): 36.4, Max: 37.7 (04-21 @ 11:05)  T(F): 97.5, Max: 99.9 (04-21 @ 11:05)  HR: 83 (66 - 87)  BP: 128/99 (123/50 - 158/76)  BP(mean): --  RR: 17 (17 - 18)  SpO2: 100% (99% - 100%)    appears well today.  comfortable.  no respiratory distress.  lungs clear.  no WRR.  heart RRR.  NS1S2.  distant heart sounds.  abdomen distended.  soft and nontender.  no pedal edema.  A&Ox3.                        13.2   8.7   )-----------( 361      ( 20 Apr 2017 06:38 )             40.8       04-20    140  |  100  |  17  ----------------------------<  123<H>  3.3<L>   |  30  |  0.81    Ca    9.1      20 Apr 2017 06:38

## 2017-04-22 VITALS
HEART RATE: 70 BPM | SYSTOLIC BLOOD PRESSURE: 124 MMHG | TEMPERATURE: 98 F | OXYGEN SATURATION: 99 % | DIASTOLIC BLOOD PRESSURE: 55 MMHG

## 2017-04-22 LAB
ANION GAP SERPL CALC-SCNC: 9 MMOL/L — SIGNIFICANT CHANGE UP (ref 5–17)
BUN SERPL-MCNC: 17 MG/DL — SIGNIFICANT CHANGE UP (ref 7–23)
CALCIUM SERPL-MCNC: 8.9 MG/DL — SIGNIFICANT CHANGE UP (ref 8.5–10.1)
CHLORIDE SERPL-SCNC: 106 MMOL/L — SIGNIFICANT CHANGE UP (ref 96–108)
CO2 SERPL-SCNC: 26 MMOL/L — SIGNIFICANT CHANGE UP (ref 22–31)
CREAT SERPL-MCNC: 0.7 MG/DL — SIGNIFICANT CHANGE UP (ref 0.5–1.3)
GLUCOSE SERPL-MCNC: 126 MG/DL — HIGH (ref 70–99)
HCT VFR BLD CALC: 36.7 % — SIGNIFICANT CHANGE UP (ref 34.5–45)
HGB BLD-MCNC: 11.8 G/DL — SIGNIFICANT CHANGE UP (ref 11.5–15.5)
MCHC RBC-ENTMCNC: 31.5 PG — SIGNIFICANT CHANGE UP (ref 27–34)
MCHC RBC-ENTMCNC: 32.2 GM/DL — SIGNIFICANT CHANGE UP (ref 32–36)
MCV RBC AUTO: 98 FL — SIGNIFICANT CHANGE UP (ref 80–100)
PLATELET # BLD AUTO: 276 K/UL — SIGNIFICANT CHANGE UP (ref 150–400)
POTASSIUM SERPL-MCNC: 3.7 MMOL/L — SIGNIFICANT CHANGE UP (ref 3.5–5.3)
POTASSIUM SERPL-SCNC: 3.7 MMOL/L — SIGNIFICANT CHANGE UP (ref 3.5–5.3)
RBC # BLD: 3.74 M/UL — LOW (ref 3.8–5.2)
RBC # FLD: 16.1 % — HIGH (ref 10.3–14.5)
SODIUM SERPL-SCNC: 141 MMOL/L — SIGNIFICANT CHANGE UP (ref 135–145)
WBC # BLD: 6.3 K/UL — SIGNIFICANT CHANGE UP (ref 3.8–10.5)
WBC # FLD AUTO: 6.3 K/UL — SIGNIFICANT CHANGE UP (ref 3.8–10.5)

## 2017-04-22 RX ADMIN — Medication 1000 UNIT(S): at 09:57

## 2017-04-22 RX ADMIN — Medication 0.12 MILLIGRAM(S): at 09:56

## 2017-04-22 RX ADMIN — PREGABALIN 1000 MICROGRAM(S): 225 CAPSULE ORAL at 09:56

## 2017-04-22 RX ADMIN — Medication 100 MILLIGRAM(S): at 09:56

## 2017-04-22 RX ADMIN — Medication 40 MILLIGRAM(S): at 05:44

## 2017-04-22 RX ADMIN — METHOTREXATE 20 MILLIGRAM(S): 2.5 TABLET ORAL at 09:57

## 2017-04-22 RX ADMIN — Medication 3 MILLIGRAM(S): at 09:57

## 2017-04-22 RX ADMIN — SPIRONOLACTONE 25 MILLIGRAM(S): 25 TABLET, FILM COATED ORAL at 05:44

## 2017-04-22 RX ADMIN — AMLODIPINE BESYLATE 5 MILLIGRAM(S): 2.5 TABLET ORAL at 05:44

## 2017-04-22 RX ADMIN — Medication 10 MILLIGRAM(S): at 05:45

## 2017-04-22 RX ADMIN — Medication 25 MICROGRAM(S): at 05:45

## 2017-04-22 NOTE — PROGRESS NOTE ADULT - SUBJECTIVE AND OBJECTIVE BOX
Brief follow up note:   Pt was discharged yesterday.  She had colonoscopy yesterday w/ rectal polyp removed.   As per GI recs, no aspirin, NSAIDs for 7 days.    Follow up outpatient w/ GI in 2 weeks and repeat colonoscopy/flex sigmoidoscopy in 4 weeks.   Pt had no overnight events and remains stable for discharge home.

## 2017-04-25 DIAGNOSIS — I27.2 OTHER SECONDARY PULMONARY HYPERTENSION: ICD-10-CM

## 2017-04-25 DIAGNOSIS — Z87.440 PERSONAL HISTORY OF URINARY (TRACT) INFECTIONS: ICD-10-CM

## 2017-04-25 DIAGNOSIS — R60.9 EDEMA, UNSPECIFIED: ICD-10-CM

## 2017-04-25 DIAGNOSIS — E11.9 TYPE 2 DIABETES MELLITUS WITHOUT COMPLICATIONS: ICD-10-CM

## 2017-04-25 DIAGNOSIS — E78.5 HYPERLIPIDEMIA, UNSPECIFIED: ICD-10-CM

## 2017-04-25 DIAGNOSIS — K59.09 OTHER CONSTIPATION: ICD-10-CM

## 2017-04-25 DIAGNOSIS — M06.9 RHEUMATOID ARTHRITIS, UNSPECIFIED: ICD-10-CM

## 2017-04-25 DIAGNOSIS — E03.9 HYPOTHYROIDISM, UNSPECIFIED: ICD-10-CM

## 2017-04-25 DIAGNOSIS — I10 ESSENTIAL (PRIMARY) HYPERTENSION: ICD-10-CM

## 2017-04-25 DIAGNOSIS — Z79.899 OTHER LONG TERM (CURRENT) DRUG THERAPY: ICD-10-CM

## 2017-04-25 DIAGNOSIS — I48.2 CHRONIC ATRIAL FIBRILLATION: ICD-10-CM

## 2017-04-25 DIAGNOSIS — K62.1 RECTAL POLYP: ICD-10-CM

## 2017-04-25 DIAGNOSIS — Z88.0 ALLERGY STATUS TO PENICILLIN: ICD-10-CM

## 2017-04-25 DIAGNOSIS — Z79.82 LONG TERM (CURRENT) USE OF ASPIRIN: ICD-10-CM

## 2017-04-25 DIAGNOSIS — Z88.2 ALLERGY STATUS TO SULFONAMIDES: ICD-10-CM

## 2017-04-25 DIAGNOSIS — I50.43 ACUTE ON CHRONIC COMBINED SYSTOLIC (CONGESTIVE) AND DIASTOLIC (CONGESTIVE) HEART FAILURE: ICD-10-CM

## 2017-04-25 DIAGNOSIS — R06.02 SHORTNESS OF BREATH: ICD-10-CM

## 2017-04-25 LAB — SURGICAL PATHOLOGY FINAL REPORT - CH: SIGNIFICANT CHANGE UP

## 2017-04-27 DIAGNOSIS — I11.0 HYPERTENSIVE HEART DISEASE WITH HEART FAILURE: ICD-10-CM

## 2017-06-02 ENCOUNTER — RESULT REVIEW (OUTPATIENT)
Age: 82
End: 2017-06-02

## 2017-06-02 ENCOUNTER — OUTPATIENT (OUTPATIENT)
Dept: OUTPATIENT SERVICES | Facility: HOSPITAL | Age: 82
LOS: 1 days | Discharge: HOME | End: 2017-06-02
Payer: COMMERCIAL

## 2017-06-02 VITALS
TEMPERATURE: 97 F | HEIGHT: 60 IN | SYSTOLIC BLOOD PRESSURE: 162 MMHG | RESPIRATION RATE: 16 BRPM | WEIGHT: 132.94 LBS | DIASTOLIC BLOOD PRESSURE: 77 MMHG | OXYGEN SATURATION: 100 % | HEART RATE: 49 BPM

## 2017-06-02 DIAGNOSIS — Z98.890 OTHER SPECIFIED POSTPROCEDURAL STATES: Chronic | ICD-10-CM

## 2017-06-02 DIAGNOSIS — Z90.49 ACQUIRED ABSENCE OF OTHER SPECIFIED PARTS OF DIGESTIVE TRACT: Chronic | ICD-10-CM

## 2017-06-02 DIAGNOSIS — Z96.643 PRESENCE OF ARTIFICIAL HIP JOINT, BILATERAL: Chronic | ICD-10-CM

## 2017-06-02 PROCEDURE — 88313 SPECIAL STAINS GROUP 2: CPT | Mod: 26

## 2017-06-02 PROCEDURE — 88305 TISSUE EXAM BY PATHOLOGIST: CPT | Mod: 26

## 2017-06-06 LAB — SURGICAL PATHOLOGY FINAL REPORT - CH: SIGNIFICANT CHANGE UP

## 2017-06-07 DIAGNOSIS — R12 HEARTBURN: ICD-10-CM

## 2017-06-07 DIAGNOSIS — Z96.643 PRESENCE OF ARTIFICIAL HIP JOINT, BILATERAL: ICD-10-CM

## 2017-06-07 DIAGNOSIS — K21.9 GASTRO-ESOPHAGEAL REFLUX DISEASE WITHOUT ESOPHAGITIS: ICD-10-CM

## 2017-06-07 DIAGNOSIS — Z12.11 ENCOUNTER FOR SCREENING FOR MALIGNANT NEOPLASM OF COLON: ICD-10-CM

## 2017-06-07 DIAGNOSIS — K44.9 DIAPHRAGMATIC HERNIA WITHOUT OBSTRUCTION OR GANGRENE: ICD-10-CM

## 2017-06-07 DIAGNOSIS — D12.8 BENIGN NEOPLASM OF RECTUM: ICD-10-CM

## 2017-06-07 DIAGNOSIS — I25.10 ATHEROSCLEROTIC HEART DISEASE OF NATIVE CORONARY ARTERY WITHOUT ANGINA PECTORIS: ICD-10-CM

## 2017-06-07 DIAGNOSIS — E03.9 HYPOTHYROIDISM, UNSPECIFIED: ICD-10-CM

## 2017-06-07 DIAGNOSIS — Z88.0 ALLERGY STATUS TO PENICILLIN: ICD-10-CM

## 2017-06-07 DIAGNOSIS — I10 ESSENTIAL (PRIMARY) HYPERTENSION: ICD-10-CM

## 2017-06-07 DIAGNOSIS — M06.9 RHEUMATOID ARTHRITIS, UNSPECIFIED: ICD-10-CM

## 2017-06-07 DIAGNOSIS — Z79.82 LONG TERM (CURRENT) USE OF ASPIRIN: ICD-10-CM

## 2017-06-07 DIAGNOSIS — I50.9 HEART FAILURE, UNSPECIFIED: ICD-10-CM

## 2017-06-07 DIAGNOSIS — Z88.2 ALLERGY STATUS TO SULFONAMIDES: ICD-10-CM

## 2017-07-14 ENCOUNTER — EMERGENCY (EMERGENCY)
Facility: HOSPITAL | Age: 82
LOS: 0 days | Discharge: HOME | End: 2017-07-14
Attending: EMERGENCY MEDICINE | Admitting: EMERGENCY MEDICINE
Payer: COMMERCIAL

## 2017-07-14 VITALS
SYSTOLIC BLOOD PRESSURE: 132 MMHG | RESPIRATION RATE: 18 BRPM | HEART RATE: 56 BPM | OXYGEN SATURATION: 100 % | DIASTOLIC BLOOD PRESSURE: 81 MMHG

## 2017-07-14 VITALS
TEMPERATURE: 98 F | OXYGEN SATURATION: 100 % | SYSTOLIC BLOOD PRESSURE: 172 MMHG | DIASTOLIC BLOOD PRESSURE: 79 MMHG | WEIGHT: 136.03 LBS | HEIGHT: 60 IN | RESPIRATION RATE: 18 BRPM | HEART RATE: 52 BPM

## 2017-07-14 DIAGNOSIS — Z79.82 LONG TERM (CURRENT) USE OF ASPIRIN: ICD-10-CM

## 2017-07-14 DIAGNOSIS — Z88.0 ALLERGY STATUS TO PENICILLIN: ICD-10-CM

## 2017-07-14 DIAGNOSIS — I50.9 HEART FAILURE, UNSPECIFIED: ICD-10-CM

## 2017-07-14 DIAGNOSIS — Z96.643 PRESENCE OF ARTIFICIAL HIP JOINT, BILATERAL: ICD-10-CM

## 2017-07-14 DIAGNOSIS — Z98.890 OTHER SPECIFIED POSTPROCEDURAL STATES: Chronic | ICD-10-CM

## 2017-07-14 DIAGNOSIS — Z90.49 ACQUIRED ABSENCE OF OTHER SPECIFIED PARTS OF DIGESTIVE TRACT: ICD-10-CM

## 2017-07-14 DIAGNOSIS — Z88.2 ALLERGY STATUS TO SULFONAMIDES: ICD-10-CM

## 2017-07-14 DIAGNOSIS — Z98.890 OTHER SPECIFIED POSTPROCEDURAL STATES: ICD-10-CM

## 2017-07-14 DIAGNOSIS — R94.31 ABNORMAL ELECTROCARDIOGRAM [ECG] [EKG]: ICD-10-CM

## 2017-07-14 DIAGNOSIS — I61.9 NONTRAUMATIC INTRACEREBRAL HEMORRHAGE, UNSPECIFIED: ICD-10-CM

## 2017-07-14 DIAGNOSIS — Z90.49 ACQUIRED ABSENCE OF OTHER SPECIFIED PARTS OF DIGESTIVE TRACT: Chronic | ICD-10-CM

## 2017-07-14 DIAGNOSIS — R06.02 SHORTNESS OF BREATH: ICD-10-CM

## 2017-07-14 DIAGNOSIS — I48.91 UNSPECIFIED ATRIAL FIBRILLATION: ICD-10-CM

## 2017-07-14 DIAGNOSIS — Z96.643 PRESENCE OF ARTIFICIAL HIP JOINT, BILATERAL: Chronic | ICD-10-CM

## 2017-07-14 LAB
ADD ON TEST-SPECIMEN IN LAB: SIGNIFICANT CHANGE UP
ALBUMIN SERPL ELPH-MCNC: 3.7 G/DL — SIGNIFICANT CHANGE UP (ref 3.3–5)
ALP SERPL-CCNC: 68 U/L — SIGNIFICANT CHANGE UP (ref 40–120)
ALT FLD-CCNC: 21 U/L — SIGNIFICANT CHANGE UP (ref 12–78)
ANION GAP SERPL CALC-SCNC: 6 MMOL/L — SIGNIFICANT CHANGE UP (ref 5–17)
AST SERPL-CCNC: 16 U/L — SIGNIFICANT CHANGE UP (ref 15–37)
BILIRUB SERPL-MCNC: 1 MG/DL — SIGNIFICANT CHANGE UP (ref 0.2–1.2)
BUN SERPL-MCNC: 11 MG/DL — SIGNIFICANT CHANGE UP (ref 7–23)
CALCIUM SERPL-MCNC: 9.6 MG/DL — SIGNIFICANT CHANGE UP (ref 8.5–10.1)
CHLORIDE SERPL-SCNC: 105 MMOL/L — SIGNIFICANT CHANGE UP (ref 96–108)
CO2 SERPL-SCNC: 28 MMOL/L — SIGNIFICANT CHANGE UP (ref 22–31)
CREAT SERPL-MCNC: 0.7 MG/DL — SIGNIFICANT CHANGE UP (ref 0.5–1.3)
GLUCOSE SERPL-MCNC: 91 MG/DL — SIGNIFICANT CHANGE UP (ref 70–99)
INR BLD: 1.05 RATIO — SIGNIFICANT CHANGE UP (ref 0.88–1.16)
NT-PROBNP SERPL-SCNC: 132 PG/ML — SIGNIFICANT CHANGE UP (ref 0–450)
POTASSIUM SERPL-MCNC: 4.6 MMOL/L — SIGNIFICANT CHANGE UP (ref 3.5–5.3)
POTASSIUM SERPL-SCNC: 4.6 MMOL/L — SIGNIFICANT CHANGE UP (ref 3.5–5.3)
PROT SERPL-MCNC: 7.2 GM/DL — SIGNIFICANT CHANGE UP (ref 6–8.3)
PROTHROM AB SERPL-ACNC: 11.3 SEC — SIGNIFICANT CHANGE UP (ref 9.8–12.7)
SODIUM SERPL-SCNC: 139 MMOL/L — SIGNIFICANT CHANGE UP (ref 135–145)
TROPONIN I SERPL-MCNC: <0.015 NG/ML — SIGNIFICANT CHANGE UP (ref 0.01–0.04)

## 2017-07-14 PROCEDURE — 93010 ELECTROCARDIOGRAM REPORT: CPT

## 2017-07-14 PROCEDURE — 71010: CPT | Mod: 26

## 2017-07-14 PROCEDURE — 99285 EMERGENCY DEPT VISIT HI MDM: CPT

## 2017-07-14 RX ORDER — LEVOTHYROXINE SODIUM 125 MCG
1 TABLET ORAL
Qty: 0 | Refills: 0 | COMMUNITY

## 2017-07-14 RX ORDER — AMLODIPINE BESYLATE 2.5 MG/1
1 TABLET ORAL
Qty: 0 | Refills: 0 | COMMUNITY

## 2017-07-14 RX ORDER — FUROSEMIDE 40 MG
40 TABLET ORAL ONCE
Qty: 0 | Refills: 0 | Status: COMPLETED | OUTPATIENT
Start: 2017-07-14 | End: 2017-07-14

## 2017-07-14 RX ADMIN — Medication 40 MILLIGRAM(S): at 15:45

## 2017-07-14 NOTE — ED PROVIDER NOTE - OBJECTIVE STATEMENT
89 y/o F with a h/o CHF, AFib, p/w SOB, worsening over the passed week, and worse today. +VILLALBA, relieved with rest. Pt report some increased LE edema, on low dose lasix. Did not take diuretic today. No fevers, chills, cough, CP, abd pain, N/V/D. No changes in meds.

## 2017-07-14 NOTE — ED PROVIDER NOTE - MEDICAL DECISION MAKING DETAILS
CXR WNL, EKG nonishcemic.  Troponin WNL, BNP WNL.  Question mild CHF exacerbation, given 40 mg Lasix IV in ED with good diuresis while in ED.  Able to ambulate without SOB, and no new hypoxia.  Okay for d/c home and f/u with PCP, and Cardiology for further evaluation and management.

## 2017-09-06 ENCOUNTER — RESULT REVIEW (OUTPATIENT)
Age: 82
End: 2017-09-06

## 2017-09-06 ENCOUNTER — OUTPATIENT (OUTPATIENT)
Dept: OUTPATIENT SERVICES | Facility: HOSPITAL | Age: 82
LOS: 1 days | Discharge: HOME | End: 2017-09-06
Payer: COMMERCIAL

## 2017-09-06 VITALS
DIASTOLIC BLOOD PRESSURE: 71 MMHG | OXYGEN SATURATION: 100 % | SYSTOLIC BLOOD PRESSURE: 177 MMHG | RESPIRATION RATE: 13 BRPM | WEIGHT: 138.01 LBS | TEMPERATURE: 98 F | HEART RATE: 51 BPM

## 2017-09-06 DIAGNOSIS — Z90.49 ACQUIRED ABSENCE OF OTHER SPECIFIED PARTS OF DIGESTIVE TRACT: Chronic | ICD-10-CM

## 2017-09-06 DIAGNOSIS — Z98.890 OTHER SPECIFIED POSTPROCEDURAL STATES: Chronic | ICD-10-CM

## 2017-09-06 DIAGNOSIS — Z96.643 PRESENCE OF ARTIFICIAL HIP JOINT, BILATERAL: Chronic | ICD-10-CM

## 2017-09-06 PROCEDURE — 88305 TISSUE EXAM BY PATHOLOGIST: CPT | Mod: 26

## 2017-09-06 NOTE — ASU PATIENT PROFILE, ADULT - PMH
Afib    CHF (congestive heart failure)    Goiter    HTN (hypertension)    Hypothyroid    ICH (intracerebral hemorrhage)    Rheumatoid arthritis

## 2017-09-07 LAB — SURGICAL PATHOLOGY FINAL REPORT - CH: SIGNIFICANT CHANGE UP

## 2017-09-12 DIAGNOSIS — I50.9 HEART FAILURE, UNSPECIFIED: ICD-10-CM

## 2017-09-12 DIAGNOSIS — Z88.2 ALLERGY STATUS TO SULFONAMIDES: ICD-10-CM

## 2017-09-12 DIAGNOSIS — Z96.643 PRESENCE OF ARTIFICIAL HIP JOINT, BILATERAL: ICD-10-CM

## 2017-09-12 DIAGNOSIS — Z79.82 LONG TERM (CURRENT) USE OF ASPIRIN: ICD-10-CM

## 2017-09-12 DIAGNOSIS — I10 ESSENTIAL (PRIMARY) HYPERTENSION: ICD-10-CM

## 2017-09-12 DIAGNOSIS — D12.8 BENIGN NEOPLASM OF RECTUM: ICD-10-CM

## 2017-09-12 DIAGNOSIS — Z88.0 ALLERGY STATUS TO PENICILLIN: ICD-10-CM

## 2017-09-12 DIAGNOSIS — M06.9 RHEUMATOID ARTHRITIS, UNSPECIFIED: ICD-10-CM

## 2017-09-12 DIAGNOSIS — E03.9 HYPOTHYROIDISM, UNSPECIFIED: ICD-10-CM

## 2017-10-09 ENCOUNTER — RESULT REVIEW (OUTPATIENT)
Age: 82
End: 2017-10-09

## 2017-10-09 ENCOUNTER — OUTPATIENT (OUTPATIENT)
Dept: OUTPATIENT SERVICES | Facility: HOSPITAL | Age: 82
LOS: 1 days | Discharge: HOME | End: 2017-10-09
Payer: COMMERCIAL

## 2017-10-09 DIAGNOSIS — C50.919 MALIGNANT NEOPLASM OF UNSPECIFIED SITE OF UNSPECIFIED FEMALE BREAST: ICD-10-CM

## 2017-10-09 DIAGNOSIS — Z90.49 ACQUIRED ABSENCE OF OTHER SPECIFIED PARTS OF DIGESTIVE TRACT: Chronic | ICD-10-CM

## 2017-10-09 DIAGNOSIS — Z98.890 OTHER SPECIFIED POSTPROCEDURAL STATES: Chronic | ICD-10-CM

## 2017-10-09 DIAGNOSIS — Z96.643 PRESENCE OF ARTIFICIAL HIP JOINT, BILATERAL: Chronic | ICD-10-CM

## 2017-10-09 PROCEDURE — 88321 CONSLTJ&REPRT SLD PREP ELSWR: CPT

## 2017-10-11 ENCOUNTER — OUTPATIENT (OUTPATIENT)
Dept: OUTPATIENT SERVICES | Facility: HOSPITAL | Age: 82
LOS: 1 days | Discharge: HOME | End: 2017-10-11

## 2017-10-11 DIAGNOSIS — Z98.890 OTHER SPECIFIED POSTPROCEDURAL STATES: Chronic | ICD-10-CM

## 2017-10-11 DIAGNOSIS — C50.919 MALIGNANT NEOPLASM OF UNSPECIFIED SITE OF UNSPECIFIED FEMALE BREAST: ICD-10-CM

## 2017-10-11 DIAGNOSIS — Z98.49 CATARACT EXTRACTION STATUS, UNSPECIFIED EYE: Chronic | ICD-10-CM

## 2017-10-11 DIAGNOSIS — Z90.49 ACQUIRED ABSENCE OF OTHER SPECIFIED PARTS OF DIGESTIVE TRACT: Chronic | ICD-10-CM

## 2017-10-11 DIAGNOSIS — Z96.643 PRESENCE OF ARTIFICIAL HIP JOINT, BILATERAL: Chronic | ICD-10-CM

## 2017-10-11 LAB
ANION GAP SERPL CALC-SCNC: 7 MMOL/L — SIGNIFICANT CHANGE UP (ref 5–17)
BASOPHILS # BLD AUTO: 0 K/UL — SIGNIFICANT CHANGE UP (ref 0–0.2)
BASOPHILS NFR BLD AUTO: 0.6 % — SIGNIFICANT CHANGE UP (ref 0–2)
BUN SERPL-MCNC: 20 MG/DL — SIGNIFICANT CHANGE UP (ref 7–23)
CALCIUM SERPL-MCNC: 9.2 MG/DL — SIGNIFICANT CHANGE UP (ref 8.5–10.1)
CHLORIDE SERPL-SCNC: 105 MMOL/L — SIGNIFICANT CHANGE UP (ref 96–108)
CO2 SERPL-SCNC: 27 MMOL/L — SIGNIFICANT CHANGE UP (ref 22–31)
CREAT SERPL-MCNC: 0.72 MG/DL — SIGNIFICANT CHANGE UP (ref 0.5–1.3)
EOSINOPHIL # BLD AUTO: 0 K/UL — SIGNIFICANT CHANGE UP (ref 0–0.5)
EOSINOPHIL NFR BLD AUTO: 0.5 % — SIGNIFICANT CHANGE UP (ref 0–6)
GLUCOSE SERPL-MCNC: 135 MG/DL — HIGH (ref 70–99)
HCT VFR BLD CALC: 39.3 % — SIGNIFICANT CHANGE UP (ref 34.5–45)
HGB BLD-MCNC: 13.4 G/DL — SIGNIFICANT CHANGE UP (ref 11.5–15.5)
LYMPHOCYTES # BLD AUTO: 1.3 K/UL — SIGNIFICANT CHANGE UP (ref 1–3.3)
LYMPHOCYTES # BLD AUTO: 16.4 % — SIGNIFICANT CHANGE UP (ref 13–44)
MCHC RBC-ENTMCNC: 32.8 PG — SIGNIFICANT CHANGE UP (ref 27–34)
MCHC RBC-ENTMCNC: 34 GM/DL — SIGNIFICANT CHANGE UP (ref 32–36)
MCV RBC AUTO: 96.4 FL — SIGNIFICANT CHANGE UP (ref 80–100)
MONOCYTES # BLD AUTO: 0.3 K/UL — SIGNIFICANT CHANGE UP (ref 0–0.9)
MONOCYTES NFR BLD AUTO: 3.6 % — SIGNIFICANT CHANGE UP (ref 2–14)
NEUTROPHILS # BLD AUTO: 6.2 K/UL — SIGNIFICANT CHANGE UP (ref 1.8–7.4)
NEUTROPHILS NFR BLD AUTO: 79 % — HIGH (ref 43–77)
PLATELET # BLD AUTO: 309 K/UL — SIGNIFICANT CHANGE UP (ref 150–400)
POTASSIUM SERPL-MCNC: 3.8 MMOL/L — SIGNIFICANT CHANGE UP (ref 3.5–5.3)
POTASSIUM SERPL-SCNC: 3.8 MMOL/L — SIGNIFICANT CHANGE UP (ref 3.5–5.3)
RBC # BLD: 4.08 M/UL — SIGNIFICANT CHANGE UP (ref 3.8–5.2)
RBC # FLD: 15.6 % — HIGH (ref 10.3–14.5)
SODIUM SERPL-SCNC: 139 MMOL/L — SIGNIFICANT CHANGE UP (ref 135–145)
WBC # BLD: 7.9 K/UL — SIGNIFICANT CHANGE UP (ref 3.8–10.5)
WBC # FLD AUTO: 7.9 K/UL — SIGNIFICANT CHANGE UP (ref 3.8–10.5)

## 2017-10-11 NOTE — ASU PATIENT PROFILE, ADULT - ABILITY TO HEAR (WITH HEARING AID OR HEARING APPLIANCE IF NORMALLY USED):
Mildly to Moderately Impaired: difficulty hearing in some environments or speaker may need to increase volume or speak distinctly/no devices used

## 2017-10-11 NOTE — ASU PATIENT PROFILE, ADULT - PMH
Afib    Breast cancer  left  CHF (congestive heart failure)    Goiter    History of anemia  s/p hip replacements  HTN (hypertension)    Hypothyroid    ICH (intracerebral hemorrhage)  2014: Followed by Dr. Cesar Leigh of colon  Followed by Dr. Bang  Melanoma of eye, right  [facility] by Opthamologist  Rheumatoid arthritis    Varicose veins Adhesive capsulitis of left shoulder    Afib    Breast cancer  left  CHF (congestive heart failure)    Goiter    History of anemia  s/p hip replacements  HTN (hypertension)    Hypothyroid    ICH (intracerebral hemorrhage)  2014: Followed by Dr. Cesar Leigh of colon  Followed by Dr. Bang  Melanoma of eye, right  Followed in [facility] by Opthamologist  Rheumatoid arthritis    Varicose veins

## 2017-10-11 NOTE — CHART NOTE - NSCHARTNOTEFT_GEN_A_CORE
Patient seen in PST today:    V/S: T-97.8, P-68, R-14, B/P-141/52, 02sat-98% on room air.    Ht: 60" Wt: 61.4kgs    EZ sponges, holistic sheet, and day of procedure instructions provided and reviewed with patient and son.     Pending medical clearances with Dr. Greenfield (PCP, and Dr. Palmer (Cardiologist) as scheduled.    Breast navigator pamphlet/card provided and reviewed with patient and son.

## 2017-10-11 NOTE — ASU PATIENT PROFILE, ADULT - PSH
H/O bilateral hip replacements    H/O umbilical hernia repair    History of appendectomy    S/P cataract extraction  B/L; 2013  S/P colonoscopy  unsure of all dates; last one 2017  S/P vein stripping  1974

## 2017-10-13 ENCOUNTER — OUTPATIENT (OUTPATIENT)
Dept: OUTPATIENT SERVICES | Facility: HOSPITAL | Age: 82
LOS: 1 days | Discharge: HOME | End: 2017-10-13
Payer: COMMERCIAL

## 2017-10-13 DIAGNOSIS — Z96.643 PRESENCE OF ARTIFICIAL HIP JOINT, BILATERAL: Chronic | ICD-10-CM

## 2017-10-13 DIAGNOSIS — Z98.890 OTHER SPECIFIED POSTPROCEDURAL STATES: Chronic | ICD-10-CM

## 2017-10-13 DIAGNOSIS — Z98.49 CATARACT EXTRACTION STATUS, UNSPECIFIED EYE: Chronic | ICD-10-CM

## 2017-10-13 DIAGNOSIS — Z90.49 ACQUIRED ABSENCE OF OTHER SPECIFIED PARTS OF DIGESTIVE TRACT: Chronic | ICD-10-CM

## 2017-10-13 PROCEDURE — 71020: CPT | Mod: 26

## 2017-10-17 DIAGNOSIS — C50.012 MALIGNANT NEOPLASM OF NIPPLE AND AREOLA, LEFT FEMALE BREAST: ICD-10-CM

## 2017-10-17 RX ORDER — FAMOTIDINE 10 MG/ML
20 INJECTION INTRAVENOUS ONCE
Qty: 0 | Refills: 0 | Status: COMPLETED | OUTPATIENT
Start: 2017-10-18 | End: 2017-10-18

## 2017-10-17 RX ORDER — OXYCODONE HYDROCHLORIDE 5 MG/1
10 TABLET ORAL ONCE
Qty: 0 | Refills: 0 | Status: DISCONTINUED | OUTPATIENT
Start: 2017-10-18 | End: 2017-10-18

## 2017-10-17 RX ORDER — SODIUM CHLORIDE 9 MG/ML
1000 INJECTION, SOLUTION INTRAVENOUS
Qty: 0 | Refills: 0 | Status: DISCONTINUED | OUTPATIENT
Start: 2017-10-18 | End: 2017-10-18

## 2017-10-17 RX ORDER — FENTANYL CITRATE 50 UG/ML
50 INJECTION INTRAVENOUS
Qty: 0 | Refills: 0 | Status: DISCONTINUED | OUTPATIENT
Start: 2017-10-18 | End: 2017-10-18

## 2017-10-17 RX ORDER — OXYCODONE HYDROCHLORIDE 5 MG/1
10 TABLET ORAL EVERY 6 HOURS
Qty: 0 | Refills: 0 | Status: DISCONTINUED | OUTPATIENT
Start: 2017-10-18 | End: 2017-10-18

## 2017-10-18 ENCOUNTER — OUTPATIENT (OUTPATIENT)
Dept: OUTPATIENT SERVICES | Facility: HOSPITAL | Age: 82
LOS: 1 days | Discharge: HOME | End: 2017-10-18
Payer: COMMERCIAL

## 2017-10-18 ENCOUNTER — RESULT REVIEW (OUTPATIENT)
Age: 82
End: 2017-10-18

## 2017-10-18 VITALS
TEMPERATURE: 98 F | DIASTOLIC BLOOD PRESSURE: 63 MMHG | HEART RATE: 54 BPM | RESPIRATION RATE: 16 BRPM | SYSTOLIC BLOOD PRESSURE: 124 MMHG | OXYGEN SATURATION: 99 %

## 2017-10-18 VITALS
HEIGHT: 60 IN | OXYGEN SATURATION: 100 % | WEIGHT: 134.04 LBS | HEART RATE: 59 BPM | DIASTOLIC BLOOD PRESSURE: 75 MMHG | TEMPERATURE: 98 F | RESPIRATION RATE: 14 BRPM | SYSTOLIC BLOOD PRESSURE: 161 MMHG

## 2017-10-18 DIAGNOSIS — Z98.890 OTHER SPECIFIED POSTPROCEDURAL STATES: Chronic | ICD-10-CM

## 2017-10-18 DIAGNOSIS — Z96.643 PRESENCE OF ARTIFICIAL HIP JOINT, BILATERAL: Chronic | ICD-10-CM

## 2017-10-18 DIAGNOSIS — Z98.49 CATARACT EXTRACTION STATUS, UNSPECIFIED EYE: Chronic | ICD-10-CM

## 2017-10-18 DIAGNOSIS — Z90.49 ACQUIRED ABSENCE OF OTHER SPECIFIED PARTS OF DIGESTIVE TRACT: Chronic | ICD-10-CM

## 2017-10-18 PROCEDURE — 78806: CPT | Mod: 26

## 2017-10-18 PROCEDURE — 88342 IMHCHEM/IMCYTCHM 1ST ANTB: CPT | Mod: 26

## 2017-10-18 PROCEDURE — 88305 TISSUE EXAM BY PATHOLOGIST: CPT | Mod: 26

## 2017-10-18 PROCEDURE — 88307 TISSUE EXAM BY PATHOLOGIST: CPT | Mod: 26

## 2017-10-18 RX ORDER — LEVOTHYROXINE SODIUM 125 MCG
1 TABLET ORAL
Qty: 0 | Refills: 0 | COMMUNITY

## 2017-10-18 RX ORDER — SODIUM CHLORIDE 9 MG/ML
3 INJECTION INTRAMUSCULAR; INTRAVENOUS; SUBCUTANEOUS EVERY 8 HOURS
Qty: 0 | Refills: 0 | Status: DISCONTINUED | OUTPATIENT
Start: 2017-10-18 | End: 2017-10-18

## 2017-10-18 RX ORDER — FOLIC ACID 0.8 MG
3 TABLET ORAL
Qty: 0 | Refills: 0 | COMMUNITY

## 2017-10-18 RX ORDER — POTASSIUM CHLORIDE 20 MEQ
10 PACKET (EA) ORAL
Qty: 0 | Refills: 0 | COMMUNITY

## 2017-10-18 RX ORDER — DIGOXIN 250 MCG
1 TABLET ORAL
Qty: 0 | Refills: 0 | COMMUNITY

## 2017-10-18 RX ORDER — FUROSEMIDE 40 MG
1 TABLET ORAL
Qty: 0 | Refills: 0 | COMMUNITY

## 2017-10-18 RX ORDER — ONDANSETRON 8 MG/1
4 TABLET, FILM COATED ORAL ONCE
Qty: 0 | Refills: 0 | Status: DISCONTINUED | OUTPATIENT
Start: 2017-10-18 | End: 2017-10-18

## 2017-10-18 RX ORDER — OXYCODONE AND ACETAMINOPHEN 5; 325 MG/1; MG/1
1 TABLET ORAL EVERY 4 HOURS
Qty: 0 | Refills: 0 | Status: DISCONTINUED | OUTPATIENT
Start: 2017-10-18 | End: 2017-10-18

## 2017-10-18 RX ORDER — METHOTREXATE 2.5 MG/1
8 TABLET ORAL
Qty: 0 | Refills: 0 | COMMUNITY

## 2017-10-18 RX ORDER — ASPIRIN/CALCIUM CARB/MAGNESIUM 324 MG
1 TABLET ORAL
Qty: 0 | Refills: 0 | COMMUNITY

## 2017-10-18 RX ADMIN — FAMOTIDINE 20 MILLIGRAM(S): 10 INJECTION INTRAVENOUS at 10:53

## 2017-10-18 RX ADMIN — OXYCODONE HYDROCHLORIDE 10 MILLIGRAM(S): 5 TABLET ORAL at 13:21

## 2017-10-18 RX ADMIN — SODIUM CHLORIDE 75 MILLILITER(S): 9 INJECTION, SOLUTION INTRAVENOUS at 13:21

## 2017-10-18 RX ADMIN — OXYCODONE HYDROCHLORIDE 10 MILLIGRAM(S): 5 TABLET ORAL at 10:53

## 2017-10-18 RX ADMIN — FENTANYL CITRATE 50 MICROGRAM(S): 50 INJECTION INTRAVENOUS at 13:21

## 2017-10-18 NOTE — ASU DISCHARGE PLAN (ADULT/PEDIATRIC). - MEDICATION SUMMARY - MEDICATIONS TO CHANGE
I will SWITCH the dose or number of times a day I take the medications listed below when I get home from the hospital:    Lasix 20 mg oral tablet  -- 1 tab(s) by mouth once a day    potassium chloride 10 mEq oral tablet, extended release  -- 10 milliequivalent(s) by mouth once a day    folic acid 1 mg oral tablet  -- 3 milligram(s) by mouth once a day    digoxin 125 mcg (0.125 mg) oral tablet  -- 1 tab(s) by mouth once a day    methotrexate 2.5 mg oral tablet  -- 8 tab(s) by mouth once a week    Synthroid 25 mcg (0.025 mg) oral tablet  -- 1 tab(s) by mouth once a day    aspirin 81 mg oral tablet  -- 1 tab(s) by mouth once a day    predniSONE 1 mg oral delayed release tablet  -- 1 tab(s) by mouth once a day    cholecalciferol oral tablet  -- 5000 unit(s) by mouth once a day    cyanocobalamin 1000 mcg oral tablet  -- 2 tab(s) by mouth once a day

## 2017-10-18 NOTE — BRIEF OPERATIVE NOTE - PROCEDURE
<<-----Click on this checkbox to enter Procedure Lumpectomy of breast with sentinel node biopsy  10/18/2017    Active  [id number]

## 2017-10-18 NOTE — ASU DISCHARGE PLAN (ADULT/PEDIATRIC). - NURSING INSTRUCTIONS
For any problems or concerns, contact your doctor. [facility] patients should call the [facility]. If you cannot reach the doctor or clinic, call [facility] Emergency Department at [phone] or go to your local Emergency Department.  A responsible adult should be with you for the rest of the day and night for your safety and to help you if you needed. Resume your medications as listed on the attached Medication Record. Begin with liquids and light food ( tea, toast, Jello, soups). Advance to what you normally eat. Liquids should taken in adequate amounts today.     CALL the DOCTOR:    -Fever greater than  101F  - Signs  of infection such as : increase pain,swelling,redness,or a bad  smell coming from the wound.  -Excessive amount of bleeding.  - Any pain that appears to be getting worse.  - Vomiting  -  If you have  not urinated 8 hours after surgery or have any difficulty urinating.     A responsible adult should be with you for the rest of the day and night for your safety and to help you if you needed.    Review attached FACT SHEET if applicable.

## 2017-10-18 NOTE — BRIEF OPERATIVE NOTE - POST-OP DX
Malignant neoplasm of lower-outer quadrant of left breast of female, estrogen receptor positive  10/18/2017    Active  Arun Vitale

## 2017-10-19 DIAGNOSIS — Z00.00 ENCOUNTER FOR GENERAL ADULT MEDICAL EXAMINATION WITHOUT ABNORMAL FINDINGS: ICD-10-CM

## 2017-10-29 DIAGNOSIS — I87.2 VENOUS INSUFFICIENCY (CHRONIC) (PERIPHERAL): ICD-10-CM

## 2017-10-29 DIAGNOSIS — Z79.82 LONG TERM (CURRENT) USE OF ASPIRIN: ICD-10-CM

## 2017-10-29 DIAGNOSIS — I11.0 HYPERTENSIVE HEART DISEASE WITH HEART FAILURE: ICD-10-CM

## 2017-10-29 DIAGNOSIS — C50.512 MALIGNANT NEOPLASM OF LOWER-OUTER QUADRANT OF LEFT FEMALE BREAST: ICD-10-CM

## 2017-10-29 DIAGNOSIS — Z96.643 PRESENCE OF ARTIFICIAL HIP JOINT, BILATERAL: ICD-10-CM

## 2017-10-29 DIAGNOSIS — I48.91 UNSPECIFIED ATRIAL FIBRILLATION: ICD-10-CM

## 2017-10-29 DIAGNOSIS — I50.32 CHRONIC DIASTOLIC (CONGESTIVE) HEART FAILURE: ICD-10-CM

## 2017-10-29 DIAGNOSIS — Z85.840 PERSONAL HISTORY OF MALIGNANT NEOPLASM OF EYE: ICD-10-CM

## 2018-02-28 ENCOUNTER — RESULT REVIEW (OUTPATIENT)
Age: 83
End: 2018-02-28

## 2018-04-03 ENCOUNTER — RESULT REVIEW (OUTPATIENT)
Age: 83
End: 2018-04-03

## 2018-04-13 ENCOUNTER — RESULT REVIEW (OUTPATIENT)
Age: 83
End: 2018-04-13

## 2018-04-13 ENCOUNTER — OUTPATIENT (OUTPATIENT)
Dept: OUTPATIENT SERVICES | Facility: HOSPITAL | Age: 83
LOS: 1 days | Discharge: HOME | End: 2018-04-13
Payer: COMMERCIAL

## 2018-04-13 VITALS
TEMPERATURE: 97 F | SYSTOLIC BLOOD PRESSURE: 164 MMHG | HEIGHT: 61 IN | RESPIRATION RATE: 16 BRPM | DIASTOLIC BLOOD PRESSURE: 79 MMHG | HEART RATE: 56 BPM | WEIGHT: 132.94 LBS | OXYGEN SATURATION: 100 %

## 2018-04-13 DIAGNOSIS — K62.1 RECTAL POLYP: ICD-10-CM

## 2018-04-13 DIAGNOSIS — Z98.890 OTHER SPECIFIED POSTPROCEDURAL STATES: Chronic | ICD-10-CM

## 2018-04-13 DIAGNOSIS — Z98.49 CATARACT EXTRACTION STATUS, UNSPECIFIED EYE: Chronic | ICD-10-CM

## 2018-04-13 DIAGNOSIS — Z96.643 PRESENCE OF ARTIFICIAL HIP JOINT, BILATERAL: Chronic | ICD-10-CM

## 2018-04-13 DIAGNOSIS — Z90.49 ACQUIRED ABSENCE OF OTHER SPECIFIED PARTS OF DIGESTIVE TRACT: Chronic | ICD-10-CM

## 2018-04-13 PROCEDURE — 88305 TISSUE EXAM BY PATHOLOGIST: CPT | Mod: 26

## 2018-04-13 NOTE — ASU PATIENT PROFILE, ADULT - PMH
Adhesive capsulitis of left shoulder    Afib    Breast cancer  left  CHF (congestive heart failure)    Goiter    History of anemia  s/p hip replacements  HTN (hypertension)    Hypothyroid    ICH (intracerebral hemorrhage)  2014: Followed by Dr. Cesar Leigh of colon  Followed by Dr. Bang  Melanoma of eye, right  Followed in [facility] by Opthamologist  Rheumatoid arthritis    Varicose veins

## 2018-04-17 LAB — SURGICAL PATHOLOGY FINAL REPORT - CH: SIGNIFICANT CHANGE UP

## 2018-04-18 DIAGNOSIS — Z88.0 ALLERGY STATUS TO PENICILLIN: ICD-10-CM

## 2018-04-18 DIAGNOSIS — Z12.11 ENCOUNTER FOR SCREENING FOR MALIGNANT NEOPLASM OF COLON: ICD-10-CM

## 2018-04-18 DIAGNOSIS — I10 ESSENTIAL (PRIMARY) HYPERTENSION: ICD-10-CM

## 2018-04-18 DIAGNOSIS — E03.9 HYPOTHYROIDISM, UNSPECIFIED: ICD-10-CM

## 2018-04-18 DIAGNOSIS — Z88.2 ALLERGY STATUS TO SULFONAMIDES: ICD-10-CM

## 2018-04-18 DIAGNOSIS — Z96.643 PRESENCE OF ARTIFICIAL HIP JOINT, BILATERAL: ICD-10-CM

## 2018-04-18 DIAGNOSIS — K62.1 RECTAL POLYP: ICD-10-CM

## 2018-04-27 ENCOUNTER — RESULT REVIEW (OUTPATIENT)
Age: 83
End: 2018-04-27

## 2018-05-07 ENCOUNTER — RESULT REVIEW (OUTPATIENT)
Age: 83
End: 2018-05-07

## 2018-07-16 PROBLEM — I61.9 NONTRAUMATIC INTRACEREBRAL HEMORRHAGE, UNSPECIFIED: Chronic | Status: ACTIVE | Noted: 2017-04-19

## 2018-07-20 PROBLEM — E04.9 NONTOXIC GOITER, UNSPECIFIED: Chronic | Status: ACTIVE | Noted: 2017-09-06

## 2018-07-20 PROBLEM — I48.91 UNSPECIFIED ATRIAL FIBRILLATION: Chronic | Status: ACTIVE | Noted: 2017-04-19

## 2018-07-20 PROBLEM — I50.9 HEART FAILURE, UNSPECIFIED: Chronic | Status: ACTIVE | Noted: 2017-04-19

## 2018-07-20 PROBLEM — K63.9 DISEASE OF INTESTINE, UNSPECIFIED: Chronic | Status: ACTIVE | Noted: 2017-10-11

## 2018-07-20 PROBLEM — I10 ESSENTIAL (PRIMARY) HYPERTENSION: Chronic | Status: ACTIVE | Noted: 2017-09-06

## 2018-07-20 PROBLEM — M06.9 RHEUMATOID ARTHRITIS, UNSPECIFIED: Chronic | Status: ACTIVE | Noted: 2017-09-06

## 2018-07-20 PROBLEM — C69.91 MALIGNANT NEOPLASM OF UNSPECIFIED SITE OF RIGHT EYE: Chronic | Status: ACTIVE | Noted: 2017-10-11

## 2018-07-20 PROBLEM — M75.02 ADHESIVE CAPSULITIS OF LEFT SHOULDER: Chronic | Status: ACTIVE | Noted: 2017-10-11

## 2018-07-20 PROBLEM — I83.90 ASYMPTOMATIC VARICOSE VEINS OF UNSPECIFIED LOWER EXTREMITY: Chronic | Status: ACTIVE | Noted: 2017-10-11

## 2018-07-20 PROBLEM — E03.9 HYPOTHYROIDISM, UNSPECIFIED: Chronic | Status: ACTIVE | Noted: 2017-09-06

## 2018-08-02 ENCOUNTER — OUTPATIENT (OUTPATIENT)
Dept: OUTPATIENT SERVICES | Facility: HOSPITAL | Age: 83
LOS: 1 days | Discharge: HOME | End: 2018-08-02
Payer: COMMERCIAL

## 2018-08-02 VITALS
RESPIRATION RATE: 18 BRPM | SYSTOLIC BLOOD PRESSURE: 149 MMHG | TEMPERATURE: 98 F | OXYGEN SATURATION: 100 % | HEART RATE: 63 BPM | WEIGHT: 134.04 LBS | HEIGHT: 58 IN | DIASTOLIC BLOOD PRESSURE: 67 MMHG

## 2018-08-02 DIAGNOSIS — Z98.890 OTHER SPECIFIED POSTPROCEDURAL STATES: Chronic | ICD-10-CM

## 2018-08-02 DIAGNOSIS — Z90.49 ACQUIRED ABSENCE OF OTHER SPECIFIED PARTS OF DIGESTIVE TRACT: Chronic | ICD-10-CM

## 2018-08-02 DIAGNOSIS — Z96.643 PRESENCE OF ARTIFICIAL HIP JOINT, BILATERAL: Chronic | ICD-10-CM

## 2018-08-02 DIAGNOSIS — I47.1 SUPRAVENTRICULAR TACHYCARDIA: ICD-10-CM

## 2018-08-02 DIAGNOSIS — Z98.49 CATARACT EXTRACTION STATUS, UNSPECIFIED EYE: Chronic | ICD-10-CM

## 2018-08-02 LAB
ANION GAP SERPL CALC-SCNC: 5 MMOL/L — SIGNIFICANT CHANGE UP (ref 5–17)
BASOPHILS # BLD AUTO: 0.02 K/UL — SIGNIFICANT CHANGE UP (ref 0–0.2)
BASOPHILS NFR BLD AUTO: 0.3 % — SIGNIFICANT CHANGE UP (ref 0–2)
BUN SERPL-MCNC: 18 MG/DL — SIGNIFICANT CHANGE UP (ref 7–23)
CALCIUM SERPL-MCNC: 9 MG/DL — SIGNIFICANT CHANGE UP (ref 8.5–10.1)
CHLORIDE SERPL-SCNC: 109 MMOL/L — HIGH (ref 96–108)
CO2 SERPL-SCNC: 28 MMOL/L — SIGNIFICANT CHANGE UP (ref 22–31)
CREAT SERPL-MCNC: 0.65 MG/DL — SIGNIFICANT CHANGE UP (ref 0.5–1.3)
EOSINOPHIL # BLD AUTO: 0.04 K/UL — SIGNIFICANT CHANGE UP (ref 0–0.5)
EOSINOPHIL NFR BLD AUTO: 0.5 % — SIGNIFICANT CHANGE UP (ref 0–6)
GLUCOSE SERPL-MCNC: 101 MG/DL — HIGH (ref 70–99)
HCT VFR BLD CALC: 39.8 % — SIGNIFICANT CHANGE UP (ref 34.5–45)
HGB BLD-MCNC: 12.9 G/DL — SIGNIFICANT CHANGE UP (ref 11.5–15.5)
IMM GRANULOCYTES NFR BLD AUTO: 0.3 % — SIGNIFICANT CHANGE UP (ref 0–1.5)
LYMPHOCYTES # BLD AUTO: 1.22 K/UL — SIGNIFICANT CHANGE UP (ref 1–3.3)
LYMPHOCYTES # BLD AUTO: 16.2 % — SIGNIFICANT CHANGE UP (ref 13–44)
MAGNESIUM SERPL-MCNC: 2.2 MG/DL — SIGNIFICANT CHANGE UP (ref 1.6–2.6)
MCHC RBC-ENTMCNC: 32.4 GM/DL — SIGNIFICANT CHANGE UP (ref 32–36)
MCHC RBC-ENTMCNC: 32.7 PG — SIGNIFICANT CHANGE UP (ref 27–34)
MCV RBC AUTO: 101 FL — HIGH (ref 80–100)
MONOCYTES # BLD AUTO: 0.45 K/UL — SIGNIFICANT CHANGE UP (ref 0–0.9)
MONOCYTES NFR BLD AUTO: 6 % — SIGNIFICANT CHANGE UP (ref 2–14)
NEUTROPHILS # BLD AUTO: 5.76 K/UL — SIGNIFICANT CHANGE UP (ref 1.8–7.4)
NEUTROPHILS NFR BLD AUTO: 76.7 % — SIGNIFICANT CHANGE UP (ref 43–77)
NRBC # BLD: 0 /100 WBCS — SIGNIFICANT CHANGE UP (ref 0–0)
PLATELET # BLD AUTO: 279 K/UL — SIGNIFICANT CHANGE UP (ref 150–400)
POTASSIUM SERPL-MCNC: 4.6 MMOL/L — SIGNIFICANT CHANGE UP (ref 3.5–5.3)
POTASSIUM SERPL-SCNC: 4.6 MMOL/L — SIGNIFICANT CHANGE UP (ref 3.5–5.3)
RBC # BLD: 3.94 M/UL — SIGNIFICANT CHANGE UP (ref 3.8–5.2)
RBC # FLD: 16.3 % — HIGH (ref 10.3–14.5)
SODIUM SERPL-SCNC: 142 MMOL/L — SIGNIFICANT CHANGE UP (ref 135–145)
WBC # BLD: 7.51 K/UL — SIGNIFICANT CHANGE UP (ref 3.8–10.5)
WBC # FLD AUTO: 7.51 K/UL — SIGNIFICANT CHANGE UP (ref 3.8–10.5)

## 2018-08-02 PROCEDURE — 71046 X-RAY EXAM CHEST 2 VIEWS: CPT | Mod: 26

## 2018-08-02 RX ORDER — DIGOXIN 250 MCG
1 TABLET ORAL
Qty: 0 | Refills: 0 | COMMUNITY

## 2018-08-02 RX ORDER — METHOTREXATE 2.5 MG/1
0 TABLET ORAL
Qty: 0 | Refills: 0 | COMMUNITY

## 2018-08-02 NOTE — H&P PST ADULT - ASSESSMENT
89 years old female present to PST prior to EPS, possible SVT ablation, loop recorder or PPM with Dr. Burton.  Plan   - Instructions as per procedural doctor and cardiac nurse practitioner.  - Mupirocin as directed.  - Chlorhexidine as directed.

## 2018-08-02 NOTE — H&P PST ADULT - ENMT
details… No oral lesions; no gross abnormalities Will stop Enfamil bolus feeding at midnight, but is able to replace feeds with Pediatlyte until 3 hours before surgery.

## 2018-08-02 NOTE — H&P PST ADULT - NSANTHOSAYNRD_GEN_A_CORE
No. RIKI screening performed.  STOP BANG Legend: 0-2 = LOW Risk; 3-4 = INTERMEDIATE Risk; 5-8 = HIGH Risk

## 2018-08-02 NOTE — H&P PST ADULT - FAMILY HISTORY
Father  Still living? No  Family history of emphysema, Age at diagnosis: Age Unknown  Family history of heart disease, Age at diagnosis: Age Unknown     Mother  Still living? No  Family history of diabetes mellitus, Age at diagnosis: Age Unknown  Family history of uterine cancer, Age at diagnosis: Age Unknown     Sibling  Still living? No  Family history of uterine cancer, Age at diagnosis: Age Unknown     Child  Still living? Yes, Estimated age: 61-70  Family history of uterine cancer, Age at diagnosis: Age Unknown

## 2018-08-02 NOTE — H&P PST ADULT - PMH
Adhesive capsulitis of left shoulder    Afib    Bilateral hearing loss, unspecified hearing loss type    Breast cancer  left  CHF (congestive heart failure)    Dry eyes, bilateral    Goiter    History of anemia  s/p hip replacements  History of anemia    HTN (hypertension)    Hypothyroid    ICH (intracerebral hemorrhage)  2014: Followed by Dr. Cesar Leigh of colon  Followed by Dr. Bang  Melanoma of eye, right  Followed in [facility] by Opthamologist  Rheumatoid arthritis    SVT (supraventricular tachycardia)    Varicose veins

## 2018-08-02 NOTE — H&P PST ADULT - HISTORY OF PRESENT ILLNESS
89 years old female with paroxysmal atrial fibrillation. She admits to shortness of breath and fatigue. She wore a Holter Monitor which indicated SVT and AF. She also has light headiness. Planned ablation and possible PPM or loop recorder.

## 2018-08-03 LAB
MRSA PCR RESULT.: SIGNIFICANT CHANGE UP
S AUREUS DNA NOSE QL NAA+PROBE: SIGNIFICANT CHANGE UP

## 2018-08-06 ENCOUNTER — OUTPATIENT (OUTPATIENT)
Dept: OUTPATIENT SERVICES | Facility: HOSPITAL | Age: 83
LOS: 1 days | Discharge: HOME | End: 2018-08-06

## 2018-08-06 VITALS
DIASTOLIC BLOOD PRESSURE: 74 MMHG | SYSTOLIC BLOOD PRESSURE: 153 MMHG | OXYGEN SATURATION: 99 % | HEART RATE: 66 BPM | RESPIRATION RATE: 18 BRPM

## 2018-08-06 VITALS
TEMPERATURE: 98 F | RESPIRATION RATE: 20 BRPM | HEART RATE: 64 BPM | OXYGEN SATURATION: 100 % | HEIGHT: 60 IN | WEIGHT: 132.28 LBS | SYSTOLIC BLOOD PRESSURE: 169 MMHG | DIASTOLIC BLOOD PRESSURE: 66 MMHG

## 2018-08-06 DIAGNOSIS — Z98.890 OTHER SPECIFIED POSTPROCEDURAL STATES: Chronic | ICD-10-CM

## 2018-08-06 DIAGNOSIS — Z90.49 ACQUIRED ABSENCE OF OTHER SPECIFIED PARTS OF DIGESTIVE TRACT: Chronic | ICD-10-CM

## 2018-08-06 DIAGNOSIS — Z96.643 PRESENCE OF ARTIFICIAL HIP JOINT, BILATERAL: Chronic | ICD-10-CM

## 2018-08-06 DIAGNOSIS — Z98.49 CATARACT EXTRACTION STATUS, UNSPECIFIED EYE: Chronic | ICD-10-CM

## 2018-08-06 RX ORDER — ISOPROTERENOL HYDROCHLORIDE 1 MG/5ML
1 INJECTION, SOLUTION INTRACARDIAC; INTRAMUSCULAR; INTRAVENOUS; SUBCUTANEOUS
Qty: 1 | Refills: 0 | Status: DISCONTINUED | OUTPATIENT
Start: 2018-08-06 | End: 2018-08-21

## 2018-08-06 RX ORDER — VANCOMYCIN HCL 1 G
1000 VIAL (EA) INTRAVENOUS ONCE
Qty: 0 | Refills: 0 | Status: DISCONTINUED | OUTPATIENT
Start: 2018-08-06 | End: 2018-08-21

## 2018-08-06 NOTE — ASU PATIENT PROFILE, ADULT - PMH
Adhesive capsulitis of left shoulder    Afib    Bilateral hearing loss, unspecified hearing loss type    Breast cancer  left  CHF (congestive heart failure)    Dry eyes, bilateral    Goiter    History of anemia  s/p hip replacements  History of anemia    HTN (hypertension)    Hypothyroid    ICH (intracerebral hemorrhage)  2014: Followed by Dr. Cesar Leigh of colon  Followed by Dr. Bang  Melanoma of eye, right  Followed in [facility] by Opthamologist  Rheumatoid arthritis    SVT (supraventricular tachycardia)    Varicose veins

## 2018-08-06 NOTE — PACU DISCHARGE NOTE - COMMENTS
Discharged instructions given to patient and son Alverto garcias understanding. Reviewed remote monitor with son, pt left in stable condition accompany by staff and all belongings and remote monitor.

## 2018-08-06 NOTE — ASU PATIENT PROFILE, ADULT - PSH
H/O bilateral hip replacements    H/O umbilical hernia repair    History of appendectomy    S/P breast lumpectomy  Left 2017  S/P cataract extraction  B/L; 2013  S/P colonoscopy  unsure of all dates; last one 2017  S/P vein stripping  1974

## 2018-08-10 DIAGNOSIS — I47.1 SUPRAVENTRICULAR TACHYCARDIA: ICD-10-CM

## 2018-08-10 DIAGNOSIS — Z83.3 FAMILY HISTORY OF DIABETES MELLITUS: ICD-10-CM

## 2018-08-10 DIAGNOSIS — R55 SYNCOPE AND COLLAPSE: ICD-10-CM

## 2018-08-10 DIAGNOSIS — C69.91 MALIGNANT NEOPLASM OF UNSPECIFIED SITE OF RIGHT EYE: ICD-10-CM

## 2018-08-10 DIAGNOSIS — H04.123 DRY EYE SYNDROME OF BILATERAL LACRIMAL GLANDS: ICD-10-CM

## 2018-08-10 DIAGNOSIS — I10 ESSENTIAL (PRIMARY) HYPERTENSION: ICD-10-CM

## 2018-08-10 DIAGNOSIS — Z88.2 ALLERGY STATUS TO SULFONAMIDES: ICD-10-CM

## 2018-08-10 DIAGNOSIS — H91.93 UNSPECIFIED HEARING LOSS, BILATERAL: ICD-10-CM

## 2018-08-10 DIAGNOSIS — I83.90 ASYMPTOMATIC VARICOSE VEINS OF UNSPECIFIED LOWER EXTREMITY: ICD-10-CM

## 2018-08-10 DIAGNOSIS — Z98.41 CATARACT EXTRACTION STATUS, RIGHT EYE: ICD-10-CM

## 2018-08-10 DIAGNOSIS — Z98.42 CATARACT EXTRACTION STATUS, LEFT EYE: ICD-10-CM

## 2018-08-10 DIAGNOSIS — Z79.82 LONG TERM (CURRENT) USE OF ASPIRIN: ICD-10-CM

## 2018-08-10 DIAGNOSIS — D64.9 ANEMIA, UNSPECIFIED: ICD-10-CM

## 2018-08-10 DIAGNOSIS — I50.9 HEART FAILURE, UNSPECIFIED: ICD-10-CM

## 2018-08-10 DIAGNOSIS — K21.9 GASTRO-ESOPHAGEAL REFLUX DISEASE WITHOUT ESOPHAGITIS: ICD-10-CM

## 2018-08-10 DIAGNOSIS — Z80.49 FAMILY HISTORY OF MALIGNANT NEOPLASM OF OTHER GENITAL ORGANS: ICD-10-CM

## 2018-08-10 DIAGNOSIS — M06.9 RHEUMATOID ARTHRITIS, UNSPECIFIED: ICD-10-CM

## 2018-08-10 DIAGNOSIS — I48.91 UNSPECIFIED ATRIAL FIBRILLATION: ICD-10-CM

## 2018-08-10 DIAGNOSIS — Z88.0 ALLERGY STATUS TO PENICILLIN: ICD-10-CM

## 2018-08-10 DIAGNOSIS — E03.9 HYPOTHYROIDISM, UNSPECIFIED: ICD-10-CM

## 2018-08-10 DIAGNOSIS — D37.4 NEOPLASM OF UNCERTAIN BEHAVIOR OF COLON: ICD-10-CM

## 2018-08-10 DIAGNOSIS — Z96.643 PRESENCE OF ARTIFICIAL HIP JOINT, BILATERAL: ICD-10-CM

## 2018-08-10 DIAGNOSIS — Z82.49 FAMILY HISTORY OF ISCHEMIC HEART DISEASE AND OTHER DISEASES OF THE CIRCULATORY SYSTEM: ICD-10-CM

## 2018-08-10 DIAGNOSIS — Z87.891 PERSONAL HISTORY OF NICOTINE DEPENDENCE: ICD-10-CM

## 2018-09-07 ENCOUNTER — TRANSCRIPTION ENCOUNTER (OUTPATIENT)
Age: 83
End: 2018-09-07

## 2018-10-23 PROBLEM — H04.123 DRY EYE SYNDROME OF BILATERAL LACRIMAL GLANDS: Chronic | Status: ACTIVE | Noted: 2018-08-02

## 2018-10-23 PROBLEM — I47.1 SUPRAVENTRICULAR TACHYCARDIA: Chronic | Status: ACTIVE | Noted: 2018-08-02

## 2018-10-23 PROBLEM — H91.93 UNSPECIFIED HEARING LOSS, BILATERAL: Chronic | Status: ACTIVE | Noted: 2018-08-02

## 2018-10-23 PROBLEM — Z86.2 PERSONAL HISTORY OF DISEASES OF THE BLOOD AND BLOOD-FORMING ORGANS AND CERTAIN DISORDERS INVOLVING THE IMMUNE MECHANISM: Chronic | Status: ACTIVE | Noted: 2018-08-02

## 2018-11-21 ENCOUNTER — OUTPATIENT (OUTPATIENT)
Dept: OUTPATIENT SERVICES | Facility: HOSPITAL | Age: 83
LOS: 1 days | Discharge: HOME | End: 2018-11-21
Payer: COMMERCIAL

## 2018-11-21 ENCOUNTER — RESULT REVIEW (OUTPATIENT)
Age: 83
End: 2018-11-21

## 2018-11-21 VITALS
DIASTOLIC BLOOD PRESSURE: 91 MMHG | HEART RATE: 68 BPM | OXYGEN SATURATION: 100 % | RESPIRATION RATE: 17 BRPM | TEMPERATURE: 97 F | WEIGHT: 141.98 LBS | SYSTOLIC BLOOD PRESSURE: 182 MMHG

## 2018-11-21 DIAGNOSIS — Z98.49 CATARACT EXTRACTION STATUS, UNSPECIFIED EYE: Chronic | ICD-10-CM

## 2018-11-21 DIAGNOSIS — Z96.643 PRESENCE OF ARTIFICIAL HIP JOINT, BILATERAL: Chronic | ICD-10-CM

## 2018-11-21 DIAGNOSIS — Z98.890 OTHER SPECIFIED POSTPROCEDURAL STATES: Chronic | ICD-10-CM

## 2018-11-21 DIAGNOSIS — Z90.49 ACQUIRED ABSENCE OF OTHER SPECIFIED PARTS OF DIGESTIVE TRACT: Chronic | ICD-10-CM

## 2018-11-21 PROCEDURE — 88305 TISSUE EXAM BY PATHOLOGIST: CPT | Mod: 26

## 2018-11-21 RX ORDER — SODIUM CHLORIDE 9 MG/ML
1000 INJECTION INTRAMUSCULAR; INTRAVENOUS; SUBCUTANEOUS
Qty: 0 | Refills: 0 | Status: DISCONTINUED | OUTPATIENT
Start: 2018-11-21 | End: 2018-12-06

## 2018-11-21 NOTE — ASU PATIENT PROFILE, ADULT - PMH
Adhesive capsulitis of left shoulder    Afib    Bilateral hearing loss, unspecified hearing loss type    Breast cancer  left  CHF (congestive heart failure)    Dry eyes, bilateral    Goiter    History of anemia  s/p hip replacements  History of anemia    HTN (hypertension)    Hypothyroid    ICH (intracerebral hemorrhage)  2014: Followed by Dr. Cesar Leigh of colon  Followed by Dr. Bang  Melanoma of eye, right  Followed in [facility] by Opthamologist  Rheumatoid arthritis    SVT (supraventricular tachycardia)    Varicose veins

## 2018-11-21 NOTE — ASU PATIENT PROFILE, ADULT - NS PRO MODE OF ARRIVAL
wheelchair has cane as well but able to walk without assist/wheelchair wheelchair/has cane.  but able to walk without assist. uses it for comfort

## 2018-11-23 LAB — SURGICAL PATHOLOGY FINAL REPORT - CH: SIGNIFICANT CHANGE UP

## 2018-11-27 DIAGNOSIS — Z88.0 ALLERGY STATUS TO PENICILLIN: ICD-10-CM

## 2018-11-27 DIAGNOSIS — Z88.2 ALLERGY STATUS TO SULFONAMIDES: ICD-10-CM

## 2018-11-27 DIAGNOSIS — K21.9 GASTRO-ESOPHAGEAL REFLUX DISEASE WITHOUT ESOPHAGITIS: ICD-10-CM

## 2018-11-27 DIAGNOSIS — E03.9 HYPOTHYROIDISM, UNSPECIFIED: ICD-10-CM

## 2018-11-27 DIAGNOSIS — Z96.643 PRESENCE OF ARTIFICIAL HIP JOINT, BILATERAL: ICD-10-CM

## 2018-11-27 DIAGNOSIS — Z79.82 LONG TERM (CURRENT) USE OF ASPIRIN: ICD-10-CM

## 2018-11-27 DIAGNOSIS — D12.8 BENIGN NEOPLASM OF RECTUM: ICD-10-CM

## 2018-11-27 DIAGNOSIS — Z09 ENCOUNTER FOR FOLLOW-UP EXAMINATION AFTER COMPLETED TREATMENT FOR CONDITIONS OTHER THAN MALIGNANT NEOPLASM: ICD-10-CM

## 2018-11-27 DIAGNOSIS — H91.90 UNSPECIFIED HEARING LOSS, UNSPECIFIED EAR: ICD-10-CM

## 2018-11-27 DIAGNOSIS — Z79.52 LONG TERM (CURRENT) USE OF SYSTEMIC STEROIDS: ICD-10-CM

## 2018-11-27 DIAGNOSIS — I48.91 UNSPECIFIED ATRIAL FIBRILLATION: ICD-10-CM

## 2018-11-27 DIAGNOSIS — I11.0 HYPERTENSIVE HEART DISEASE WITH HEART FAILURE: ICD-10-CM

## 2018-11-27 DIAGNOSIS — Z98.49 CATARACT EXTRACTION STATUS, UNSPECIFIED EYE: ICD-10-CM

## 2018-11-27 DIAGNOSIS — I50.9 HEART FAILURE, UNSPECIFIED: ICD-10-CM

## 2018-11-27 DIAGNOSIS — E04.9 NONTOXIC GOITER, UNSPECIFIED: ICD-10-CM

## 2018-11-27 DIAGNOSIS — Z85.3 PERSONAL HISTORY OF MALIGNANT NEOPLASM OF BREAST: ICD-10-CM

## 2019-01-25 ENCOUNTER — EMERGENCY (EMERGENCY)
Facility: HOSPITAL | Age: 84
LOS: 0 days | Discharge: HOME | End: 2019-01-25
Attending: EMERGENCY MEDICINE | Admitting: EMERGENCY MEDICINE
Payer: COMMERCIAL

## 2019-01-25 VITALS
RESPIRATION RATE: 18 BRPM | SYSTOLIC BLOOD PRESSURE: 159 MMHG | DIASTOLIC BLOOD PRESSURE: 66 MMHG | OXYGEN SATURATION: 95 % | HEART RATE: 67 BPM | WEIGHT: 139.99 LBS | TEMPERATURE: 98 F
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
## 2019-01-25 DIAGNOSIS — Z98.890 OTHER SPECIFIED POSTPROCEDURAL STATES: Chronic | ICD-10-CM

## 2019-01-25 DIAGNOSIS — F17.210 NICOTINE DEPENDENCE, CIGARETTES, UNCOMPLICATED: ICD-10-CM

## 2019-01-25 DIAGNOSIS — Z98.49 CATARACT EXTRACTION STATUS, UNSPECIFIED EYE: Chronic | ICD-10-CM

## 2019-01-25 DIAGNOSIS — Z96.643 PRESENCE OF ARTIFICIAL HIP JOINT, BILATERAL: ICD-10-CM

## 2019-01-25 DIAGNOSIS — M12.9 ARTHROPATHY, UNSPECIFIED: ICD-10-CM

## 2019-01-25 DIAGNOSIS — I10 ESSENTIAL (PRIMARY) HYPERTENSION: ICD-10-CM

## 2019-01-25 DIAGNOSIS — Z88.0 ALLERGY STATUS TO PENICILLIN: ICD-10-CM

## 2019-01-25 DIAGNOSIS — Z98.41 CATARACT EXTRACTION STATUS, RIGHT EYE: ICD-10-CM
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
## 2019-01-25 DIAGNOSIS — M25.552 PAIN IN LEFT HIP: ICD-10-CM

## 2019-01-25 DIAGNOSIS — Z90.89 ACQUIRED ABSENCE OF OTHER ORGANS: ICD-10-CM

## 2019-01-25 DIAGNOSIS — Z98.42 CATARACT EXTRACTION STATUS, LEFT EYE: ICD-10-CM

## 2019-01-25 DIAGNOSIS — I50.9 HEART FAILURE, UNSPECIFIED: ICD-10-CM

## 2019-01-25 DIAGNOSIS — Z90.49 ACQUIRED ABSENCE OF OTHER SPECIFIED PARTS OF DIGESTIVE TRACT: Chronic | ICD-10-CM

## 2019-01-25 DIAGNOSIS — Z88.2 ALLERGY STATUS TO SULFONAMIDES: ICD-10-CM
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
## 2019-01-25 DIAGNOSIS — Z85.3 PERSONAL HISTORY OF MALIGNANT NEOPLASM OF BREAST: ICD-10-CM

## 2019-01-25 DIAGNOSIS — E03.9 HYPOTHYROIDISM, UNSPECIFIED: ICD-10-CM

## 2019-01-25 DIAGNOSIS — Z96.643 PRESENCE OF ARTIFICIAL HIP JOINT, BILATERAL: Chronic | ICD-10-CM

## 2019-01-25 DIAGNOSIS — I48.91 UNSPECIFIED ATRIAL FIBRILLATION: ICD-10-CM

## 2019-01-25 DIAGNOSIS — Z79.82 LONG TERM (CURRENT) USE OF ASPIRIN: ICD-10-CM

## 2019-01-25 DIAGNOSIS — S32.019A UNSPECIFIED FRACTURE OF FIRST LUMBAR VERTEBRA, INITIAL ENCOUNTER FOR CLOSED FRACTURE: ICD-10-CM

## 2019-01-25 DIAGNOSIS — D64.9 ANEMIA, UNSPECIFIED: ICD-10-CM

## 2019-01-25 DIAGNOSIS — Z87.19 PERSONAL HISTORY OF OTHER DISEASES OF THE DIGESTIVE SYSTEM: ICD-10-CM
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
## 2019-01-25 PROCEDURE — 99284 EMERGENCY DEPT VISIT MOD MDM: CPT

## 2019-01-25 PROCEDURE — 76376 3D RENDER W/INTRP POSTPROCES: CPT | Mod: 26

## 2019-01-25 PROCEDURE — 72192 CT PELVIS W/O DYE: CPT | Mod: 26

## 2019-01-25 PROCEDURE — 72131 CT LUMBAR SPINE W/O DYE: CPT | Mod: 26

## 2019-01-25 PROCEDURE — 73502 X-RAY EXAM HIP UNI 2-3 VIEWS: CPT | Mod: 26,LT

## 2019-01-25 PROCEDURE — 73552 X-RAY EXAM OF FEMUR 2/>: CPT | Mod: 26,LT

## 2019-01-25 RX ORDER — OXYCODONE AND ACETAMINOPHEN 5; 325 MG/1; MG/1
1 TABLET ORAL ONCE
Qty: 0 | Refills: 0 | Status: DISCONTINUED | OUTPATIENT
Start: 2019-01-25 | End: 2019-01-25

## 2019-01-25 RX ORDER — DEXAMETHASONE 0.5 MG/5ML
10 ELIXIR ORAL ONCE
Qty: 0 | Refills: 0 | Status: COMPLETED | OUTPATIENT
Start: 2019-01-25 | End: 2019-01-25

## 2019-01-25 RX ADMIN — OXYCODONE AND ACETAMINOPHEN 1 TABLET(S): 5; 325 TABLET ORAL at 11:48

## 2019-01-25 RX ADMIN — OXYCODONE AND ACETAMINOPHEN 1 TABLET(S): 5; 325 TABLET ORAL at 11:11

## 2019-01-25 RX ADMIN — Medication 10 MILLIGRAM(S): at 15:52

## 2019-01-25 NOTE — ED PROVIDER NOTE - MUSCULOSKELETAL, MLM
Spine appears normal, range of motion is not limited. Pelvis and left sided hip nontender to palpation. +Pain to left hip with motion. Distal vascular motor intact.

## 2019-01-25 NOTE — ED ADULT TRIAGE NOTE - CHIEF COMPLAINT QUOTE
pt presents to ED due to left hip pain pt rec'd a cortisone shot 11 days ago with no improvement denies any trauma or injury pain with ambulation

## 2019-01-25 NOTE — ED PROVIDER NOTE - OBJECTIVE STATEMENT
89 y/o female with a PMHx of adhesive capsulitis of left shoulder, Afib, Breast CA, CHF, goiter, anemia, HTN, hypothyroid ICH, colon mass, rheumatoid arthritis, SVT, varicose veins h/o bilateral hip replacements, umbilical hernia repair, appendectomy, breast lumpectomy left (), vein stripping () presents to the ED BIBA c/o worsening left hip pain x2 weeks. Pt woke up in  with pain in her left side. Pt had a neurologist appointment and was told she has bursitis and needed a cortisone shot. Pt has been using a walker with pain ever since. Pt states "it feels like something came out". Took an  codeine pain killer last night and this morning. In ED, pt states she feels left hip pain and left buttock pain. Denies recent fall, recent trauma, fever, back pain.  Former smoker (40 years ago). Allergic to sulfur. Hip surgeon: Dr. Cobos. PMD: Dr. Greenfield. 89 y/o female with a PMHx of adhesive capsulitis of left shoulder, Afib, Breast CA, CHF, goiter, anemia, HTN, hypothyroid ICH, colon mass, rheumatoid arthritis, SVT, varicose veins h/o bilateral hip replacements, umbilical hernia repair, appendectomy, breast lumpectomy left (), vein stripping () presents to the ED BIBA c/o worsening left hip pain x2 weeks. Pt woke up in  with pain in her left side. Pt had a neurologist appointment and was told she has bursitis and needed a cortisone shot. Pt has been using a walker with pain ever since. Pt states "it feels like something came out". Took an  codeine pain killer last night and this morning. Pt lives at home with a disabled daughter and is finding increasingly difficult to take care of her daughter because of the pain. In ED, pt states she feels left hip pain and left buttock pain. Denies recent fall, recent trauma, fever, back pain.  Former smoker (40 years ago). Allergic to sulfur. Hip surgeon: Dr. Cobos. PMD: Dr. Greenfield.

## 2019-01-25 NOTE — ED PROVIDER NOTE - MEDICAL DECISION MAKING DETAILS
Pt with no trauma, chronic hip pain, pain worse over last few weeks. Plan for XR. Pt niece at bedside and informed of plan.

## 2019-01-25 NOTE — ED PROVIDER NOTE - PMH
Adhesive capsulitis of left shoulder    Afib    Bilateral hearing loss, unspecified hearing loss type    Breast cancer  left  CHF (congestive heart failure)    Dry eyes, bilateral    Goiter    History of anemia  s/p hip replacements  History of anemia    HTN (hypertension)    Hypothyroid    ICH (intracerebral hemorrhage)  2014: Followed by Dr. Cesar Leigh of colon  Followed by Dr. Bang  Melanoma of eye, right  Followed in [facility] by Opthamologist  Rheumatoid arthritis    SVT (supraventricular tachycardia)    Varicose veins

## 2019-01-25 NOTE — ED ADULT NURSE NOTE - NSIMPLEMENTINTERV_GEN_ALL_ED
Implemented All Fall with Harm Risk Interventions:  [place] to call system. Call bell, personal items and telephone within reach. Instruct patient to call for assistance. Room bathroom lighting operational. Non-slip footwear when patient is off stretcher. Physically safe environment: no spills, clutter or unnecessary equipment. Stretcher in lowest position, wheels locked, appropriate side rails in place. Provide visual cue, wrist band, yellow gown, etc. Monitor gait and stability. Monitor for mental status changes and reorient to person, place, and time. Review medications for side effects contributing to fall risk. Reinforce activity limits and safety measures with patient and family. Provide visual clues: red socks.

## 2019-01-25 NOTE — ED PROVIDER NOTE - CARE PROVIDER_API CALL
Maddy Herr), Orthopaedic Surgery  [address] [place] [address]  Phone: [phone]  Fax: [phone]

## 2019-01-25 NOTE — ED PROVIDER NOTE - PROGRESS NOTE DETAILS
Briseida CHARLES for Dr. Frazier: Pt pain improved with Percocet, however with movement pain returns. Plan CT scan. Briseida CHARLES for Dr. Frazier: Discussed with pt and nenita results of CAT scan finding. Will page spine. Attending Freddie, D/w Dr. Tavarez and pt can be d/c.  Suggest steroids and can follow up in office. Attending Freddie, d/w with case mangement and will set up for home care.  Pt and niece comfortable with d/c and will follow up with spine

## 2019-02-09 ENCOUNTER — INPATIENT (INPATIENT)
Facility: HOSPITAL | Age: 84
LOS: 2 days | Discharge: NURSING, REHAB OR LONG-TERM CARE | End: 2019-02-12
Attending: INTERNAL MEDICINE | Admitting: INTERNAL MEDICINE
Payer: COMMERCIAL

## 2019-02-09 VITALS
WEIGHT: 134.92 LBS | OXYGEN SATURATION: 97 % | HEIGHT: 60 IN | RESPIRATION RATE: 15 BRPM | HEART RATE: 75 BPM | DIASTOLIC BLOOD PRESSURE: 105 MMHG | SYSTOLIC BLOOD PRESSURE: 154 MMHG | TEMPERATURE: 98 F

## 2019-02-09 DIAGNOSIS — Z90.49 ACQUIRED ABSENCE OF OTHER SPECIFIED PARTS OF DIGESTIVE TRACT: Chronic | ICD-10-CM

## 2019-02-09 DIAGNOSIS — Z98.890 OTHER SPECIFIED POSTPROCEDURAL STATES: Chronic | ICD-10-CM

## 2019-02-09 DIAGNOSIS — Z98.49 CATARACT EXTRACTION STATUS, UNSPECIFIED EYE: Chronic | ICD-10-CM

## 2019-02-09 DIAGNOSIS — Z96.643 PRESENCE OF ARTIFICIAL HIP JOINT, BILATERAL: Chronic | ICD-10-CM

## 2019-02-09 LAB
ALBUMIN SERPL ELPH-MCNC: 3.5 G/DL — SIGNIFICANT CHANGE UP (ref 3.3–5)
ALP SERPL-CCNC: 102 U/L — SIGNIFICANT CHANGE UP (ref 40–120)
ALT FLD-CCNC: 22 U/L — SIGNIFICANT CHANGE UP (ref 12–78)
ANION GAP SERPL CALC-SCNC: 8 MMOL/L — SIGNIFICANT CHANGE UP (ref 5–17)
APPEARANCE UR: CLEAR — SIGNIFICANT CHANGE UP
APTT BLD: 28.8 SEC — SIGNIFICANT CHANGE UP (ref 27.5–36.3)
AST SERPL-CCNC: 19 U/L — SIGNIFICANT CHANGE UP (ref 15–37)
BACTERIA # UR AUTO: ABNORMAL
BILIRUB SERPL-MCNC: 1 MG/DL — SIGNIFICANT CHANGE UP (ref 0.2–1.2)
BILIRUB UR-MCNC: NEGATIVE — SIGNIFICANT CHANGE UP
BUN SERPL-MCNC: 15 MG/DL — SIGNIFICANT CHANGE UP (ref 7–23)
CALCIUM SERPL-MCNC: 9 MG/DL — SIGNIFICANT CHANGE UP (ref 8.5–10.1)
CHLORIDE SERPL-SCNC: 107 MMOL/L — SIGNIFICANT CHANGE UP (ref 96–108)
CO2 SERPL-SCNC: 28 MMOL/L — SIGNIFICANT CHANGE UP (ref 22–31)
COLOR SPEC: YELLOW — SIGNIFICANT CHANGE UP
CREAT SERPL-MCNC: 0.59 MG/DL — SIGNIFICANT CHANGE UP (ref 0.5–1.3)
DIFF PNL FLD: ABNORMAL
EPI CELLS # UR: SIGNIFICANT CHANGE UP
GLUCOSE SERPL-MCNC: 106 MG/DL — HIGH (ref 70–99)
GLUCOSE UR QL: NEGATIVE MG/DL — SIGNIFICANT CHANGE UP
INR BLD: 1.09 RATIO — SIGNIFICANT CHANGE UP (ref 0.88–1.16)
KETONES UR-MCNC: NEGATIVE — SIGNIFICANT CHANGE UP
LEUKOCYTE ESTERASE UR-ACNC: ABNORMAL
NITRITE UR-MCNC: NEGATIVE — SIGNIFICANT CHANGE UP
PH UR: 7 — SIGNIFICANT CHANGE UP (ref 5–8)
POTASSIUM SERPL-MCNC: 4.1 MMOL/L — SIGNIFICANT CHANGE UP (ref 3.5–5.3)
POTASSIUM SERPL-SCNC: 4.1 MMOL/L — SIGNIFICANT CHANGE UP (ref 3.5–5.3)
PROT SERPL-MCNC: 7.4 GM/DL — SIGNIFICANT CHANGE UP (ref 6–8.3)
PROT UR-MCNC: NEGATIVE MG/DL — SIGNIFICANT CHANGE UP
PROTHROM AB SERPL-ACNC: 12.1 SEC — SIGNIFICANT CHANGE UP (ref 10–12.9)
RBC CASTS # UR COMP ASSIST: SIGNIFICANT CHANGE UP /HPF (ref 0–4)
SODIUM SERPL-SCNC: 143 MMOL/L — SIGNIFICANT CHANGE UP (ref 135–145)
SP GR SPEC: 1 — LOW (ref 1.01–1.02)
UROBILINOGEN FLD QL: NEGATIVE MG/DL — SIGNIFICANT CHANGE UP
WBC UR QL: SIGNIFICANT CHANGE UP

## 2019-02-09 PROCEDURE — 99285 EMERGENCY DEPT VISIT HI MDM: CPT

## 2019-02-09 PROCEDURE — 73502 X-RAY EXAM HIP UNI 2-3 VIEWS: CPT | Mod: 26,RT

## 2019-02-09 PROCEDURE — 72131 CT LUMBAR SPINE W/O DYE: CPT | Mod: 26

## 2019-02-09 RX ORDER — MORPHINE SULFATE 50 MG/1
2 CAPSULE, EXTENDED RELEASE ORAL EVERY 4 HOURS
Qty: 0 | Refills: 0 | Status: DISCONTINUED | OUTPATIENT
Start: 2019-02-09 | End: 2019-02-12

## 2019-02-09 RX ORDER — MORPHINE SULFATE 50 MG/1
4 CAPSULE, EXTENDED RELEASE ORAL ONCE
Qty: 0 | Refills: 0 | Status: DISCONTINUED | OUTPATIENT
Start: 2019-02-09 | End: 2019-02-09

## 2019-02-09 RX ORDER — CHOLECALCIFEROL (VITAMIN D3) 125 MCG
1000 CAPSULE ORAL DAILY
Qty: 0 | Refills: 0 | Status: DISCONTINUED | OUTPATIENT
Start: 2019-02-09 | End: 2019-02-10

## 2019-02-09 RX ORDER — METHOTREXATE 2.5 MG/1
20 TABLET ORAL
Qty: 0 | Refills: 0 | Status: CANCELLED | OUTPATIENT
Start: 2019-02-16 | End: 2019-02-12

## 2019-02-09 RX ORDER — FOLIC ACID 0.8 MG
1 TABLET ORAL DAILY
Qty: 0 | Refills: 0 | Status: DISCONTINUED | OUTPATIENT
Start: 2019-02-09 | End: 2019-02-10

## 2019-02-09 RX ORDER — DOCUSATE SODIUM 100 MG
100 CAPSULE ORAL THREE TIMES A DAY
Qty: 0 | Refills: 0 | Status: DISCONTINUED | OUTPATIENT
Start: 2019-02-09 | End: 2019-02-12

## 2019-02-09 RX ORDER — HEPARIN SODIUM 5000 [USP'U]/ML
5000 INJECTION INTRAVENOUS; SUBCUTANEOUS EVERY 8 HOURS
Qty: 0 | Refills: 0 | Status: DISCONTINUED | OUTPATIENT
Start: 2019-02-09 | End: 2019-02-10

## 2019-02-09 RX ORDER — PANTOPRAZOLE SODIUM 20 MG/1
40 TABLET, DELAYED RELEASE ORAL DAILY
Qty: 0 | Refills: 0 | Status: DISCONTINUED | OUTPATIENT
Start: 2019-02-09 | End: 2019-02-12

## 2019-02-09 RX ORDER — SENNA PLUS 8.6 MG/1
2 TABLET ORAL AT BEDTIME
Qty: 0 | Refills: 0 | Status: DISCONTINUED | OUTPATIENT
Start: 2019-02-09 | End: 2019-02-12

## 2019-02-09 RX ORDER — EXEMESTANE 25 MG/1
25 TABLET, SUGAR COATED ORAL DAILY
Qty: 0 | Refills: 0 | Status: DISCONTINUED | OUTPATIENT
Start: 2019-02-09 | End: 2019-02-12

## 2019-02-09 RX ORDER — METHOTREXATE 2.5 MG/1
2.5 TABLET ORAL
Qty: 0 | Refills: 0 | Status: DISCONTINUED | OUTPATIENT
Start: 2019-02-09 | End: 2019-02-09

## 2019-02-09 RX ORDER — ACETAMINOPHEN 500 MG
650 TABLET ORAL EVERY 6 HOURS
Qty: 0 | Refills: 0 | Status: DISCONTINUED | OUTPATIENT
Start: 2019-02-09 | End: 2019-02-12

## 2019-02-09 RX ORDER — LIDOCAINE 4 G/100G
1 CREAM TOPICAL DAILY
Qty: 0 | Refills: 0 | Status: DISCONTINUED | OUTPATIENT
Start: 2019-02-09 | End: 2019-02-11

## 2019-02-09 RX ORDER — ONDANSETRON 8 MG/1
4 TABLET, FILM COATED ORAL EVERY 6 HOURS
Qty: 0 | Refills: 0 | Status: DISCONTINUED | OUTPATIENT
Start: 2019-02-09 | End: 2019-02-12

## 2019-02-09 RX ORDER — SODIUM CHLORIDE 9 MG/ML
1000 INJECTION INTRAMUSCULAR; INTRAVENOUS; SUBCUTANEOUS
Qty: 0 | Refills: 0 | Status: DISCONTINUED | OUTPATIENT
Start: 2019-02-09 | End: 2019-02-10

## 2019-02-09 RX ORDER — OXYCODONE HYDROCHLORIDE 5 MG/1
5 TABLET ORAL
Qty: 0 | Refills: 0 | Status: DISCONTINUED | OUTPATIENT
Start: 2019-02-09 | End: 2019-02-12

## 2019-02-09 RX ORDER — LEVOTHYROXINE SODIUM 125 MCG
25 TABLET ORAL DAILY
Qty: 0 | Refills: 0 | Status: DISCONTINUED | OUTPATIENT
Start: 2019-02-09 | End: 2019-02-12

## 2019-02-09 RX ORDER — KETOROLAC TROMETHAMINE 30 MG/ML
15 SYRINGE (ML) INJECTION ONCE
Qty: 0 | Refills: 0 | Status: DISCONTINUED | OUTPATIENT
Start: 2019-02-09 | End: 2019-02-09

## 2019-02-09 RX ORDER — ONDANSETRON 8 MG/1
4 TABLET, FILM COATED ORAL ONCE
Qty: 0 | Refills: 0 | Status: COMPLETED | OUTPATIENT
Start: 2019-02-09 | End: 2019-02-09

## 2019-02-09 RX ORDER — PREGABALIN 225 MG/1
1000 CAPSULE ORAL DAILY
Qty: 0 | Refills: 0 | Status: DISCONTINUED | OUTPATIENT
Start: 2019-02-09 | End: 2019-02-12

## 2019-02-09 RX ORDER — ASPIRIN/CALCIUM CARB/MAGNESIUM 324 MG
81 TABLET ORAL DAILY
Qty: 0 | Refills: 0 | Status: DISCONTINUED | OUTPATIENT
Start: 2019-02-09 | End: 2019-02-12

## 2019-02-09 RX ADMIN — MORPHINE SULFATE 4 MILLIGRAM(S): 50 CAPSULE, EXTENDED RELEASE ORAL at 14:48

## 2019-02-09 RX ADMIN — Medication 15 MILLIGRAM(S): at 14:48

## 2019-02-09 RX ADMIN — Medication 15 MILLIGRAM(S): at 20:23

## 2019-02-09 RX ADMIN — MORPHINE SULFATE 4 MILLIGRAM(S): 50 CAPSULE, EXTENDED RELEASE ORAL at 20:23

## 2019-02-09 RX ADMIN — HEPARIN SODIUM 5000 UNIT(S): 5000 INJECTION INTRAVENOUS; SUBCUTANEOUS at 21:24

## 2019-02-09 RX ADMIN — ONDANSETRON 4 MILLIGRAM(S): 8 TABLET, FILM COATED ORAL at 14:48

## 2019-02-09 NOTE — H&P ADULT - NSHPLABSRESULTS_GEN_ALL_CORE
CT lumbar spine 1/25/19: acute compression fracture of L1 spine with loss of height 25% with mild retropulsion

## 2019-02-09 NOTE — H&P ADULT - HISTORY OF PRESENT ILLNESS
Patient is a 89 yrs old women with h/o RA, on follow up with a rheumatologist presented to the ED from [facility] with c/o sever back pain and inability to get out of her bed sec to pain. Patient came to the ED 2 weeks back with c/o sever back pain that she noticed after waking up from sleep. She was found to have an L1 fracture acute and was discharged home with medrol pack and percocet.  Patient sustained a fall 2 weeks prior to that but states that she was fine after the fall.  She is here with family. Stays alone at home with a disabled daughter whom she took care of up until she had the back pain.  States that at baseline, she has difficulty turning around in the bed because pain in both hip. It got worse after the fall. Over the past two weeks, pain was so intense 9/10 severity occassionally travelling to her legs. No sensory impairment. No difficulty moving her legs except from pain.  No fever chills or rigor. No diarrhaea or constipation. Regular BM per patient.  Noticed bladder incontinence lately.

## 2019-02-09 NOTE — ED PROVIDER NOTE - OBJECTIVE STATEMENT
89 y/o female with a PMHx of Anemia, SVT, Breast CA, HTN, Rheumatoid Arthritis, Afib, CHF presents to the ED c/o right sided lower back pain x 2 weeks. States that she has been unable to move due to the pain. Was seen in ED 2 weeks ago for hip pain and was diagnosed with a lumbar fracture. Pt was put on Oxycodone, Percocet and a Lidocaine Patch. Pt has also been taking Tylenol for her symptoms. Denies any recent falls or trauma. Takes 5mg of steroids for her arthritis. Allergic to sulfa drugs and penicillin. PCP: Dr. Greenfield.

## 2019-02-09 NOTE — H&P ADULT - NSHPSOCIALHISTORY_GEN_ALL_CORE
Patient lives at home with disabled daughter. Denies any smoking or alcohol  Was independent with her ADLs up until she fell

## 2019-02-09 NOTE — ED ADULT TRIAGE NOTE - CHIEF COMPLAINT QUOTE
pt reports with increasing back pain related to known lumbar fx diagnosed 2 weeks ago. took oxycodone at 5am, and 2 tylenol at 1130a

## 2019-02-09 NOTE — ED ADULT NURSE NOTE - NSIMPLEMENTINTERV_GEN_ALL_ED
Implemented All Fall Risk Interventions:  [place] to call system. Call bell, personal items and telephone within reach. Instruct patient to call for assistance. Room bathroom lighting operational. Non-slip footwear when patient is off stretcher. Physically safe environment: no spills, clutter or unnecessary equipment. Stretcher in lowest position, wheels locked, appropriate side rails in place. Provide visual cue, wrist band, yellow gown, etc. Monitor gait and stability. Monitor for mental status changes and reorient to person, place, and time. Review medications for side effects contributing to fall risk. Reinforce activity limits and safety measures with patient and family.

## 2019-02-09 NOTE — ED PROVIDER NOTE - MEDICAL DECISION MAKING DETAILS
Plan to evaluate for UTI, pain medication for back. Likely admit to hospital due to pain and inability to ambulate.

## 2019-02-09 NOTE — ED PROVIDER NOTE - PMH
Adhesive capsulitis of left shoulder    Afib    Bilateral hearing loss, unspecified hearing loss type    Breast cancer  left  CHF (congestive heart failure)    Dry eyes, bilateral    Goiter    History of anemia  s/p hip replacements  History of anemia    HTN (hypertension)    Hypothyroid    ICH (intracerebral hemorrhage)  2014: Followed by Dr. Cesar Leigh of colon  Followed by Dr. Bang  Melanoma of eye, right  Followed in [facility] by Opthamologist  Rheumatoid arthritis    SVT (supraventricular tachycardia)    Varicose veins

## 2019-02-09 NOTE — H&P ADULT - NSHPPHYSICALEXAM_GEN_ALL_CORE
Alert oriented x3. In distress from pain  No pallor icterus, cyanosis or clubbing  Patient lying in bed. Supine  No pallor icterus, cyanosis or clubbing  CVS: s1s2+, no s3s4 rub or murmer  CNS: Moving all 4 limbs. No sensory deficits noted both LE  Resp: Air entry equal b/l. No ronchi or crackles.  Extremities: No edema

## 2019-02-09 NOTE — H&P ADULT - ASSESSMENT
89 yrs old women with h/o recent diagnosis of acute fracture of L1 presented with severe back pain, with limited mobility and failure to thrive    1. Acute L1 fracture  2. Sever back pain  3. Failure to thrive  4. H/o Rheumatoid arthritis    Patient is back in the ED with sever back pain despite being on medrol pack and percocet. She has limited mobility sec to pain. Plan is to admit her to medicine.  Started her on morphine 2 mg IV q4 hrs with oxycodone 5 mg every 4 hrs. Also started her on lidoderm patch.  Given her worsening of pain, compared to her baseline, will get CT lumbar spine  Pain service consulted. Need to optimizes her pain medications and transition to PO prior to discharge  Ortho consulted and may need braces. Await ortho recs. She doesn't have any focal deficit at this time.  Once pain is better controlled, will need PT OT eval and treat.  Stool softeners with narcotics  Heparin for DVT prophylaxis   consulted.  Patient on methotrexate 2.5 mg. She takes the medications every Saturday. States that she took it today.    D/w family. Updated the plan of care with them.

## 2019-02-09 NOTE — ED ADULT NURSE NOTE - OBJECTIVE STATEMENT
Pt biba for back pain related to a non surgical fracture from  2 weeks ago. patient was d/c from from [facility] and sent home on pain meds.   Returns to the hospital today complaining of increasing back pain.

## 2019-02-10 DIAGNOSIS — M53.86 OTHER SPECIFIED DORSOPATHIES, LUMBAR REGION: ICD-10-CM

## 2019-02-10 LAB
ANION GAP SERPL CALC-SCNC: 5 MMOL/L — SIGNIFICANT CHANGE UP (ref 5–17)
BASOPHILS # BLD AUTO: 0.02 K/UL — SIGNIFICANT CHANGE UP (ref 0–0.2)
BASOPHILS NFR BLD AUTO: 0.4 % — SIGNIFICANT CHANGE UP (ref 0–2)
BUN SERPL-MCNC: 19 MG/DL — SIGNIFICANT CHANGE UP (ref 7–23)
CALCIUM SERPL-MCNC: 8.3 MG/DL — LOW (ref 8.5–10.1)
CHLORIDE SERPL-SCNC: 108 MMOL/L — SIGNIFICANT CHANGE UP (ref 96–108)
CO2 SERPL-SCNC: 31 MMOL/L — SIGNIFICANT CHANGE UP (ref 22–31)
CREAT SERPL-MCNC: 0.71 MG/DL — SIGNIFICANT CHANGE UP (ref 0.5–1.3)
CULTURE RESULTS: SIGNIFICANT CHANGE UP
EOSINOPHIL # BLD AUTO: 0.06 K/UL — SIGNIFICANT CHANGE UP (ref 0–0.5)
EOSINOPHIL NFR BLD AUTO: 1.1 % — SIGNIFICANT CHANGE UP (ref 0–6)
GLUCOSE SERPL-MCNC: 116 MG/DL — HIGH (ref 70–99)
HCT VFR BLD CALC: 37.5 % — SIGNIFICANT CHANGE UP (ref 34.5–45)
HGB BLD-MCNC: 12 G/DL — SIGNIFICANT CHANGE UP (ref 11.5–15.5)
IMM GRANULOCYTES NFR BLD AUTO: 0.4 % — SIGNIFICANT CHANGE UP (ref 0–1.5)
LYMPHOCYTES # BLD AUTO: 0.75 K/UL — LOW (ref 1–3.3)
LYMPHOCYTES # BLD AUTO: 13.1 % — SIGNIFICANT CHANGE UP (ref 13–44)
MCHC RBC-ENTMCNC: 32 GM/DL — SIGNIFICANT CHANGE UP (ref 32–36)
MCHC RBC-ENTMCNC: 33.1 PG — SIGNIFICANT CHANGE UP (ref 27–34)
MCV RBC AUTO: 103.3 FL — HIGH (ref 80–100)
MONOCYTES # BLD AUTO: 0.58 K/UL — SIGNIFICANT CHANGE UP (ref 0–0.9)
MONOCYTES NFR BLD AUTO: 10.2 % — SIGNIFICANT CHANGE UP (ref 2–14)
NEUTROPHILS # BLD AUTO: 4.28 K/UL — SIGNIFICANT CHANGE UP (ref 1.8–7.4)
NEUTROPHILS NFR BLD AUTO: 74.8 % — SIGNIFICANT CHANGE UP (ref 43–77)
NRBC # BLD: 0 /100 WBCS — SIGNIFICANT CHANGE UP (ref 0–0)
PLATELET # BLD AUTO: 210 K/UL — SIGNIFICANT CHANGE UP (ref 150–400)
POTASSIUM SERPL-MCNC: 4.2 MMOL/L — SIGNIFICANT CHANGE UP (ref 3.5–5.3)
POTASSIUM SERPL-SCNC: 4.2 MMOL/L — SIGNIFICANT CHANGE UP (ref 3.5–5.3)
RBC # BLD: 3.63 M/UL — LOW (ref 3.8–5.2)
RBC # FLD: 16 % — HIGH (ref 10.3–14.5)
SODIUM SERPL-SCNC: 144 MMOL/L — SIGNIFICANT CHANGE UP (ref 135–145)
SPECIMEN SOURCE: SIGNIFICANT CHANGE UP
WBC # BLD: 5.71 K/UL — SIGNIFICANT CHANGE UP (ref 3.8–10.5)
WBC # FLD AUTO: 5.71 K/UL — SIGNIFICANT CHANGE UP (ref 3.8–10.5)

## 2019-02-10 RX ORDER — DIAZEPAM 5 MG
2 TABLET ORAL ONCE
Qty: 0 | Refills: 0 | Status: DISCONTINUED | OUTPATIENT
Start: 2019-02-10 | End: 2019-02-12

## 2019-02-10 RX ORDER — FOLIC ACID 0.8 MG
3 TABLET ORAL DAILY
Qty: 0 | Refills: 0 | Status: DISCONTINUED | OUTPATIENT
Start: 2019-02-10 | End: 2019-02-12

## 2019-02-10 RX ORDER — CHOLECALCIFEROL (VITAMIN D3) 125 MCG
5000 CAPSULE ORAL DAILY
Qty: 0 | Refills: 0 | Status: DISCONTINUED | OUTPATIENT
Start: 2019-02-10 | End: 2019-02-12

## 2019-02-10 RX ADMIN — LIDOCAINE 1 PATCH: 4 CREAM TOPICAL at 18:22

## 2019-02-10 RX ADMIN — Medication 5 MILLIGRAM(S): at 06:54

## 2019-02-10 RX ADMIN — Medication 25 MICROGRAM(S): at 06:54

## 2019-02-10 RX ADMIN — Medication 1000 UNIT(S): at 12:32

## 2019-02-10 RX ADMIN — MORPHINE SULFATE 2 MILLIGRAM(S): 50 CAPSULE, EXTENDED RELEASE ORAL at 07:32

## 2019-02-10 RX ADMIN — LIDOCAINE 1 PATCH: 4 CREAM TOPICAL at 12:34

## 2019-02-10 RX ADMIN — PREGABALIN 1000 MICROGRAM(S): 225 CAPSULE ORAL at 12:33

## 2019-02-10 RX ADMIN — MORPHINE SULFATE 2 MILLIGRAM(S): 50 CAPSULE, EXTENDED RELEASE ORAL at 07:41

## 2019-02-10 RX ADMIN — PANTOPRAZOLE SODIUM 40 MILLIGRAM(S): 20 TABLET, DELAYED RELEASE ORAL at 12:34

## 2019-02-10 RX ADMIN — MORPHINE SULFATE 2 MILLIGRAM(S): 50 CAPSULE, EXTENDED RELEASE ORAL at 21:33

## 2019-02-10 RX ADMIN — Medication 100 MILLIGRAM(S): at 21:33

## 2019-02-10 RX ADMIN — HEPARIN SODIUM 5000 UNIT(S): 5000 INJECTION INTRAVENOUS; SUBCUTANEOUS at 06:54

## 2019-02-10 RX ADMIN — Medication 1 MILLIGRAM(S): at 12:32

## 2019-02-10 RX ADMIN — MORPHINE SULFATE 2 MILLIGRAM(S): 50 CAPSULE, EXTENDED RELEASE ORAL at 22:30

## 2019-02-10 RX ADMIN — Medication 81 MILLIGRAM(S): at 12:32

## 2019-02-10 NOTE — CONSULT NOTE ADULT - SUBJECTIVE AND OBJECTIVE BOX
89 y o w female w rt buttock pain x 2-3 weeks  getting worse  unable to wb rt lower extremity w/o significant pain    full note dictated    nv intact  + SSLR rt   - SLR left    ct scan stenosis  old fx L5  degen spondy L45  stenosis L34,L45

## 2019-02-10 NOTE — PHYSICAL THERAPY INITIAL EVALUATION ADULT - GENERAL OBSERVATIONS, REHAB EVAL
Pt rec'd sitting up in bedside chair, pleasant and cooperative with PT. Pt with no complaints at rest, but endorses severe 10/10 pain when bearing weight through RLE.

## 2019-02-10 NOTE — PHYSICAL THERAPY INITIAL EVALUATION ADULT - RANGE OF MOTION EXAMINATION, REHAB EVAL
bilateral lower extremity ROM was WFL (within functional limits)/except left shoulder elevation/bilateral upper extremity ROM was WFL (within functional limits)

## 2019-02-11 PROCEDURE — 72148 MRI LUMBAR SPINE W/O DYE: CPT | Mod: 26

## 2019-02-11 RX ORDER — LIDOCAINE 4 G/100G
1 CREAM TOPICAL DAILY
Qty: 0 | Refills: 0 | Status: DISCONTINUED | OUTPATIENT
Start: 2019-02-11 | End: 2019-02-12

## 2019-02-11 RX ADMIN — LIDOCAINE 1 PATCH: 4 CREAM TOPICAL at 11:18

## 2019-02-11 RX ADMIN — LIDOCAINE 1 PATCH: 4 CREAM TOPICAL at 00:39

## 2019-02-11 RX ADMIN — Medication 5000 UNIT(S): at 11:18

## 2019-02-11 RX ADMIN — Medication 5 MILLIGRAM(S): at 06:07

## 2019-02-11 RX ADMIN — OXYCODONE HYDROCHLORIDE 5 MILLIGRAM(S): 5 TABLET ORAL at 10:00

## 2019-02-11 RX ADMIN — PANTOPRAZOLE SODIUM 40 MILLIGRAM(S): 20 TABLET, DELAYED RELEASE ORAL at 09:31

## 2019-02-11 RX ADMIN — OXYCODONE HYDROCHLORIDE 5 MILLIGRAM(S): 5 TABLET ORAL at 09:30

## 2019-02-11 RX ADMIN — Medication 81 MILLIGRAM(S): at 11:18

## 2019-02-11 RX ADMIN — PREGABALIN 1000 MICROGRAM(S): 225 CAPSULE ORAL at 09:32

## 2019-02-11 RX ADMIN — LIDOCAINE 1 PATCH: 4 CREAM TOPICAL at 23:39

## 2019-02-11 RX ADMIN — LIDOCAINE 1 PATCH: 4 CREAM TOPICAL at 18:24

## 2019-02-11 RX ADMIN — MORPHINE SULFATE 2 MILLIGRAM(S): 50 CAPSULE, EXTENDED RELEASE ORAL at 06:07

## 2019-02-11 RX ADMIN — Medication 3 MILLIGRAM(S): at 09:31

## 2019-02-11 RX ADMIN — Medication 25 MICROGRAM(S): at 06:07

## 2019-02-11 RX ADMIN — EXEMESTANE 25 MILLIGRAM(S): 25 TABLET, SUGAR COATED ORAL at 11:19

## 2019-02-11 RX ADMIN — Medication 100 MILLIGRAM(S): at 06:07

## 2019-02-11 NOTE — PROGRESS NOTE ADULT - ASSESSMENT
Assessment/Plan    *RT-sided sciatica  MRI LS spine  Pain meds prn  Cont PT  Lidoderm patch    *RA  Con PO steroids    *Hypothyroidism- cont synthroid    *H/o ICH- daughter refused SQ Heparin

## 2019-02-11 NOTE — PHARMACOTHERAPY INTERVENTION NOTE - COMMENTS
MD ordered methotrexate 2.5 mg weekly. As per Med Rec and previous admission, patient takes 20 mg weekly on Saturdays. Spoke with MD and entered correct order.
duplicate order for lidocaine patch discontinued.

## 2019-02-12 ENCOUNTER — TRANSCRIPTION ENCOUNTER (OUTPATIENT)
Age: 84
End: 2019-02-12

## 2019-02-12 VITALS
RESPIRATION RATE: 18 BRPM | TEMPERATURE: 98 F | SYSTOLIC BLOOD PRESSURE: 143 MMHG | DIASTOLIC BLOOD PRESSURE: 57 MMHG | HEART RATE: 62 BPM | OXYGEN SATURATION: 98 %

## 2019-02-12 DIAGNOSIS — S32.110A NONDISPLACED ZONE I FRACTURE OF SACRUM, INITIAL ENCOUNTER FOR CLOSED FRACTURE: ICD-10-CM

## 2019-02-12 RX ORDER — ACETAMINOPHEN 500 MG
2 TABLET ORAL
Qty: 0 | Refills: 0 | COMMUNITY
Start: 2019-02-12

## 2019-02-12 RX ORDER — OXYCODONE HYDROCHLORIDE 5 MG/1
1 TABLET ORAL
Qty: 0 | Refills: 0 | COMMUNITY
Start: 2019-02-12

## 2019-02-12 RX ORDER — PANTOPRAZOLE SODIUM 20 MG/1
1 TABLET, DELAYED RELEASE ORAL
Qty: 0 | Refills: 0 | COMMUNITY
Start: 2019-02-12

## 2019-02-12 RX ORDER — DOCUSATE SODIUM 100 MG
1 CAPSULE ORAL
Qty: 0 | Refills: 0 | DISCHARGE
Start: 2019-02-12

## 2019-02-12 RX ORDER — LIDOCAINE 4 G/100G
1 CREAM TOPICAL
Qty: 0 | Refills: 0 | COMMUNITY
Start: 2019-02-12

## 2019-02-12 RX ORDER — SENNA PLUS 8.6 MG/1
2 TABLET ORAL
Qty: 0 | Refills: 0 | DISCHARGE
Start: 2019-02-12

## 2019-02-12 RX ADMIN — PREGABALIN 1000 MICROGRAM(S): 225 CAPSULE ORAL at 12:27

## 2019-02-12 RX ADMIN — OXYCODONE HYDROCHLORIDE 5 MILLIGRAM(S): 5 TABLET ORAL at 12:29

## 2019-02-12 RX ADMIN — Medication 81 MILLIGRAM(S): at 12:26

## 2019-02-12 RX ADMIN — OXYCODONE HYDROCHLORIDE 5 MILLIGRAM(S): 5 TABLET ORAL at 01:24

## 2019-02-12 RX ADMIN — EXEMESTANE 25 MILLIGRAM(S): 25 TABLET, SUGAR COATED ORAL at 12:30

## 2019-02-12 RX ADMIN — OXYCODONE HYDROCHLORIDE 5 MILLIGRAM(S): 5 TABLET ORAL at 06:13

## 2019-02-12 RX ADMIN — Medication 5 MILLIGRAM(S): at 06:01

## 2019-02-12 RX ADMIN — OXYCODONE HYDROCHLORIDE 5 MILLIGRAM(S): 5 TABLET ORAL at 13:25

## 2019-02-12 RX ADMIN — OXYCODONE HYDROCHLORIDE 5 MILLIGRAM(S): 5 TABLET ORAL at 18:03

## 2019-02-12 RX ADMIN — PANTOPRAZOLE SODIUM 40 MILLIGRAM(S): 20 TABLET, DELAYED RELEASE ORAL at 12:27

## 2019-02-12 RX ADMIN — OXYCODONE HYDROCHLORIDE 5 MILLIGRAM(S): 5 TABLET ORAL at 00:54

## 2019-02-12 RX ADMIN — Medication 25 MICROGRAM(S): at 06:01

## 2019-02-12 RX ADMIN — LIDOCAINE 1 PATCH: 4 CREAM TOPICAL at 12:25

## 2019-02-12 RX ADMIN — Medication 5000 UNIT(S): at 12:26

## 2019-02-12 RX ADMIN — OXYCODONE HYDROCHLORIDE 5 MILLIGRAM(S): 5 TABLET ORAL at 06:43

## 2019-02-12 RX ADMIN — Medication 3 MILLIGRAM(S): at 12:27

## 2019-02-12 NOTE — DISCHARGE NOTE ADULT - MEDICATION SUMMARY - MEDICATIONS TO STOP TAKING
I will STOP taking the medications listed below when I get home from the hospital:    Medrol 4 mg oral tablet  -- Medrol dose pack - please take as directed on insert.  -- It is very important that you take or use this exactly as directed.  Do not skip doses or discontinue unless directed by your doctor.  Obtain medical advice before taking any non-prescription drugs as some may affect the action of this medication.  Take with food or milk.

## 2019-02-12 NOTE — PROGRESS NOTE ADULT - REASON FOR ADMISSION
Worsening of back pain x2 weeks.

## 2019-02-12 NOTE — DISCHARGE NOTE ADULT - CARE PLAN
Principal Discharge DX:	Sacral fracture  Goal:	brace, PT  Assessment and plan of treatment:	Outpatient f/u with DR Stewart

## 2019-02-12 NOTE — PROGRESS NOTE ADULT - SUBJECTIVE AND OBJECTIVE BOX
CC-  Worsening of back pain x2 weeks. (10 Feb 2019 10:59)    HPI:  Patient is a 89 yrs old women with h/o RA, on follow up with a rheumatologist presented to the ED from [facility] with c/o sever back pain and inability to get out of her bed sec to pain. Patient came to the ED 2 weeks back with c/o sever back pain that she noticed after waking up from sleep. She was found to have an L1 fracture acute and was discharged home with medrol pack and percocet.  Patient sustained a fall 2 weeks prior to that but states that she was fine after the fall.  She is here with family. Stays alone at home with a disabled daughter whom she took care of up until she had the back pain.  States that at baseline, she has difficulty turning around in the bed because pain in both hip. It got worse after the fall. Over the past two weeks, pain was so intense 9/10 severity occassionally travelling to her legs. No sensory impairment. No difficulty moving her legs except from pain.  No fever chills or rigor. No diarrhaea or constipation. Regular BM per patient.  Noticed bladder incontinence lately. (2019 19:07)    2/10/19- +RT-sided sciatica    Review of system- All 10 systems reviewed and is as per HPI otherwise negative.     T(C): 36.7 (02-10-19 @ 11:37), Max: 37.1 (02-10-19 @ 00:02)  HR: 85 (02-10-19 @ 11:37) (63 - 85)  BP: 156/64 (02-10-19 @ 11:37) (125/68 - 156/64)  RR: 18 (02-10-19 @ 11:37) (16 - 76)  SpO2: 96% (02-10-19 @ 11:37) (93% - 99%)  Wt(kg): --    LABS:                        12.0   5.71  )-----------( 210      ( 10 Feb 2019 08:21 )             37.5     10 Feb 2019 08:21    144    |  108    |  19     ----------------------------<  116    4.2     |  31     |  0.71     Ca    8.3        10 Feb 2019 08:21    TPro  7.4    /  Alb  3.5    /  TBili  1.0    /  DBili  x      /  AST  19     /  ALT  22     /  AlkPhos  102    2019 14:45    LIVER FUNCTIONS - ( 2019 14:45 )  Alb: 3.5 g/dL / Pro: 7.4 gm/dL / ALK PHOS: 102 U/L / ALT: 22 U/L / AST: 19 U/L / GGT: x           PT/INR - ( 2019 14:45 )   PT: 12.1 sec;   INR: 1.09 ratio      PTT - ( 2019 14:45 )  PTT:28.8 sec    Urinalysis Basic - ( 2019 15:01 )  Color: Yellow / Appearance: Clear / S.005 / pH: x  Gluc: x / Ketone: Negative  / Bili: Negative / Urobili: Negative mg/dL   Blood: x / Protein: Negative mg/dL / Nitrite: Negative   Leuk Esterase: Trace / RBC: 0-2 /HPF / WBC 0-2   Sq Epi: x / Non Sq Epi: Occasional / Bacteria: Occasional    RADIOLOGY & ADDITIONAL TESTS:      PHYSICAL EXAM:  GENERAL: NAD, well-groomed, well-developed  HEAD:  Atraumatic, Normocephalic  EYES: EOMI, PERRLA, conjunctiva and sclera clear  HEENT: Moist mucous membranes  NECK: Supple, No JVD  NERVOUS SYSTEM:  Alert & Oriented X3, Motor Strength 5/5 B/L upper and lower extremities; DTRs 2+ intact and symmetric  CHEST/LUNG: Clear to auscultation bilaterally; No rales, rhonchi, wheezing, or rubs  HEART: Regular rate and rhythm; No murmurs, rubs, or gallops  ABDOMEN: Soft, Nontender, Nondistended; Bowel sounds present  GENITOURINARY- Voiding, no palpable bladder  EXTREMITIES:  2+ Peripheral Pulses, No clubbing, cyanosis, or edema  MUSCULOSKELTAL- No muscle tenderness,   SKIN-no rash, no lesion  CNS- alert, oriented X3, non focal     MEDICATIONS  (STANDING):  aspirin  chewable 81 milliGRAM(s) Oral daily  cholecalciferol 5000 Unit(s) Oral daily  cyanocobalamin 1000 MICROGram(s) Oral daily  diazepam    Tablet 2 milliGRAM(s) Oral once  docusate sodium 100 milliGRAM(s) Oral three times a day  exemestane 25 milliGRAM(s) Oral daily  folic acid 3 milliGRAM(s) Oral daily  levothyroxine 25 MICROGram(s) Oral daily  lidocaine   Patch 1 Patch Transdermal daily  pantoprazole    Tablet 40 milliGRAM(s) Oral daily  predniSONE   Tablet 5 milliGRAM(s) Oral daily    MEDICATIONS  (PRN):  acetaminophen   Tablet .. 650 milliGRAM(s) Oral every 6 hours PRN Temp greater or equal to 38.5C (101.3F), Moderate Pain (4 - 6)  morphine  - Injectable 2 milliGRAM(s) IV Push every 4 hours PRN Severe Pain (7 - 10)  ondansetron Injectable 4 milliGRAM(s) IV Push every 6 hours PRN Nausea  oxyCODONE    IR 5 milliGRAM(s) Oral four times a day PRN Moderate Pain (4 - 6)  polyvinyl alcohol 1.4%/povidone 0.6% Ophthalmic Solution - Peds 1 Drop(s) Both EYES two times a day PRN Dry Eyes  senna 2 Tablet(s) Oral at bedtime PRN Constipation    Assessment/Plan  #RT-sided sciatica  Spine eval DR Stewart appreciated  MRI LS spine  Pain meds prn  Cont PT    #RA  Con PO steroids    #Hypothyroidism- cont synthroid    #H/o ICH- daughter refused SQ Heparin    #Dispo- MRI spine, PT eval.
CC-  Worsening of back pain x2 weeks. (10 Feb 2019 10:59)    HPI:  Patient is a 89 yr old women with h/o RA, on follow up with a rheumatologist presented to the ED from [facility] with c/o sever back pain and inability to get out of her bed sec to pain. Patient came to the ED 2 weeks back with c/o sever back pain that she noticed after waking up from sleep. She was found to have an L1 fracture acute and was discharged home with medrol pack and percocet.  Patient sustained a fall 2 weeks prior to that but states that she was fine after the fall.  She is here with family. Stays alone at home with a disabled daughter whom she took care of up until she had the back pain.  States that at baseline, she has difficulty turning around in the bed because pain in both hip. It got worse after the fall. Over the past two weeks, pain was so intense 9/10 severity occasionally travelling to her legs. No sensory impairment. No difficulty moving her legs except from pain.  No fever chills or rigor. No diarrhaea or constipation. Regular BM per patient.  Noticed bladder incontinence lately.     2/10/19- +RT-sided sciatica  19 Patient with marked R sciatica with activity,   19 Patient with continued complaints of BP and R leg pain. Better at rest    PAST MEDICAL & SURGICAL HISTORY:  Bilateral hearing loss, unspecified hearing loss type  History of anemia  Dry eyes, bilateral  SVT (supraventricular tachycardia)  Adhesive capsulitis of left shoulder  Mass of colon: Followed by Dr. Bang  Varicose veins  History of anemia: s/p hip replacements  Melanoma of eye, right: Followed in [facility] by Opthamologist  Breast cancer: left  Goiter  HTN (hypertension)  Rheumatoid arthritis  Hypothyroid  ICH (intracerebral hemorrhage): : Followed by Dr. Cesar Stearns  CHF (congestive heart failure)  S/P breast lumpectomy: Left   S/P colonoscopy: unsure of all dates; last one   S/P vein strippin  S/P cataract extraction: B/L;   History of appendectomy  H/O umbilical hernia repair  H/O bilateral hip replacements    MEDICATIONS  (STANDING):  aspirin  chewable 81 milliGRAM(s) Oral daily  cholecalciferol 5000 Unit(s) Oral daily  cyanocobalamin 1000 MICROGram(s) Oral daily  diazepam    Tablet 2 milliGRAM(s) Oral once  docusate sodium 100 milliGRAM(s) Oral three times a day  exemestane 25 milliGRAM(s) Oral daily  folic acid 3 milliGRAM(s) Oral daily  levothyroxine 25 MICROGram(s) Oral daily  lidocaine   Patch 1 Patch Transdermal daily  pantoprazole    Tablet 40 milliGRAM(s) Oral daily  predniSONE   Tablet 5 milliGRAM(s) Oral daily    MEDICATIONS  (PRN):  acetaminophen   Tablet .. 650 milliGRAM(s) Oral every 6 hours PRN Temp greater or equal to 38.5C (101.3F), Moderate Pain (4 - 6)  morphine  - Injectable 2 milliGRAM(s) IV Push every 4 hours PRN Severe Pain (7 - 10)  ondansetron Injectable 4 milliGRAM(s) IV Push every 6 hours PRN Nausea  oxyCODONE    IR 5 milliGRAM(s) Oral four times a day PRN Moderate Pain (4 - 6)  polyvinyl alcohol 1.4%/povidone 0.6% Ophthalmic Solution - Peds 1 Drop(s) Both EYES two times a day PRN Dry Eyes  senna 2 Tablet(s) Oral at bedtime PRN Constipation    Allergies    penicillins (Pruritus)  sulfa drugs (Unknown)    Intolerances    Review of system- All 10 systems reviewed and is as per HPI otherwise negative.     PE:    Vital Signs Last 24 Hrs  T(C): 36.9 (2019 05:26), Max: 36.9 (2019 05:26)  T(F): 98.4 (2019 05:26), Max: 98.4 (2019 05:26)  HR: 51 (2019 05:26) (51 - 85)  BP: 125/48 (2019 05:26) (115/53 - 157/74)  BP(mean): --  RR: 18 (2019 05:26) (18 - 18)  SpO2: 100% (2019 05:26) (95% - 100%)    Patient comfortable laying flat in bed. Oncelog rolling, notes marked R sciatica and back pain  (+) lumbar tenderness  Moderate R sciatic notch tenderness  Persistent (+) R SLR, negative contralateral SLR  No focal motor deficits    LABS:  MRI LS spine  Findings suggesting nondisplaced insufficiency fractures of the bilateral   sacral ala and at S1-S2 endplates. If clinically indicated a nuclear bone   scan can be performed for further evaluation.    No acute lumbar spine pathology or fracture. No high-grade spinal   canal/neural foraminal stenosis.    Unchanged chronic mild-moderate superior endplate compression deformity   at L5, with mild retropulsion resulting in moderate spinal canal stenosis L4/5.      CT lumbar spine  Old compression fracture at L5 level with loss of 50% of vertebral body   height. 3 mm retropulsion with mild canal   stenosis. No evidence of acute fracture or acute pathology.
CC-  Worsening of back pain x2 weeks. (10 Feb 2019 10:59)    HPI:  Patient is a 89 yr old women with h/o RA, on follow up with a rheumatologist presented to the ED from [facility] with c/o sever back pain and inability to get out of her bed sec to pain. Patient came to the ED 2 weeks back with c/o sever back pain that she noticed after waking up from sleep. She was found to have an L1 fracture acute and was discharged home with medrol pack and percocet.  Patient sustained a fall 2 weeks prior to that but states that she was fine after the fall.  She is here with family. Stays alone at home with a disabled daughter whom she took care of up until she had the back pain.  States that at baseline, she has difficulty turning around in the bed because pain in both hip. It got worse after the fall. Over the past two weeks, pain was so intense 9/10 severity occasionally travelling to her legs. No sensory impairment. No difficulty moving her legs except from pain.  No fever chills or rigor. No diarrhaea or constipation. Regular BM per patient.  Noticed bladder incontinence lately.     2/10/19- +RT-sided sciatica  19 Patient with marked R sciatica with activity, MRI still pending.    PAST MEDICAL & SURGICAL HISTORY:  Bilateral hearing loss, unspecified hearing loss type  History of anemia  Dry eyes, bilateral  SVT (supraventricular tachycardia)  Adhesive capsulitis of left shoulder  Mass of colon: Followed by Dr. Bang  Varicose veins  History of anemia: s/p hip replacements  Melan[facility] of [facility], right: [facility]  Breast cancer: left  Goiter  HTN (hypertension)  Rheumatoid arthritis  Hypothyroid  ICH (intracerebral hemorrhage): : Followed by Dr. Cesar Stearns  CHF (congestive heart failure)  S/P breast lumpectomy: Left   S/P colonoscopy: unsure of all dates; last one   S/P vein strippin  S/P cataract extraction: B/L;   History of appendectomy  H/O umbilical hernia repair  H/O bilateral hip replacements    MEDICATIONS  (STANDING):  aspirin  chewable 81 milliGRAM(s) Oral daily  cholecalciferol 5000 Unit(s) Oral daily  cyanocobalamin 1000 MICROGram(s) Oral daily  diazepam    Tablet 2 milliGRAM(s) Oral once  docusate sodium 100 milliGRAM(s) Oral three times a day  exemestane 25 milliGRAM(s) Oral daily  folic acid 3 milliGRAM(s) Oral daily  levothyroxine 25 MICROGram(s) Oral daily  lidocaine   Patch 1 Patch Transdermal daily  pantoprazole    Tablet 40 milliGRAM(s) Oral daily  predniSONE   Tablet 5 milliGRAM(s) Oral daily    MEDICATIONS  (PRN):  acetaminophen   Tablet .. 650 milliGRAM(s) Oral every 6 hours PRN Temp greater or equal to 38.5C (101.3F), Moderate Pain (4 - 6)  morphine  - Injectable 2 milliGRAM(s) IV Push every 4 hours PRN Severe Pain (7 - 10)  ondansetron Injectable 4 milliGRAM(s) IV Push every 6 hours PRN Nausea  oxyCODONE    IR 5 milliGRAM(s) Oral four times a day PRN Moderate Pain (4 - 6)  polyvinyl alcohol 1.4%/povidone 0.6% Ophthalmic Solution - Peds 1 Drop(s) Both EYES two times a day PRN Dry Eyes  senna 2 Tablet(s) Oral at bedtime PRN Constipation    Allergies    penicillins (Pruritus)  sulfa drugs (Unknown)    Intolerances    Review of system- All 10 systems reviewed and is as per HPI otherwise negative.     PE:    Vital Signs Last 24 Hrs  T(C): 36.7 (10 Feb 2019 17:49), Max: 36.7 (10 Feb 2019 11:37)  T(F): 98.1 (10 Feb 2019 17:49), Max: 98.1 (10 Feb 2019 11:37)  HR: 86 (10 Feb 2019 17:49) (85 - 86)  BP: 149/66 (10 Feb 2019 17:49) (149/66 - 156/64)  BP(mean): --  RR: 18 (10 Feb 2019 17:49) (18 - 18)  SpO2: 96% (10 Feb 2019 17:49) (96% - 96%)    Patient initially comfortable sitting on commode. Once transferring, notes marked R sciatica  Mild lumbar tenderness  Marked R sciatic notch tenderness  Markedly (+) R SLR, negative contralateral SLR  No focal motor deficits    LABS:                          12.0   5.71  )-----------( 210      ( 10 Feb 2019 08:21 )             37.5     02-10    144  |  108  |  19  ----------------------------<  116<H>  4.2   |  31  |  0.71    Ca    8.3<L>      10 2019 08:21    TPro  7.4  /  Alb  3.5  /  TBili  1.0  /  DBili  x   /  AST  19  /  ALT  22  /  AlkPhos  102  02-09      CT lumbar spine  Old compression fracture at L5 level with loss of 50% of vertebral body   height. 3 mm retropulsion with mild canal   stenosis. No evidence of acute fracture or acute pathology.
CC-  Worsening of back pain x2 weeks. (10 Feb 2019 10:59)    HPI:  Patient is a 89 yrs old women with h/o RA, on follow up with a rheumatologist presented to the ED from [facility] with c/o sever back pain and inability to get out of her bed sec to pain. Patient came to the ED 2 weeks back with c/o sever back pain that she noticed after waking up from sleep. She was found to have an L1 fracture acute and was discharged home with medrol pack and percocet.  Patient sustained a fall 2 weeks prior to that but states that she was fine after the fall.  She is here with family. Stays alone at home with a disabled daughter whom she took care of up until she had the back pain.  States that at baseline, she has difficulty turning around in the bed because pain in both hip. It got worse after the fall. Over the past two weeks, pain was so intense 9/10 severity occassionally travelling to her legs. No sensory impairment. No difficulty moving her legs except from pain.  No fever chills or rigor. No diarrhaea or constipation. Regular BM per patient.  Noticed bladder incontinence lately. (09 Feb 2019 19:07)    2/10/19- +RT-sided sciatica  2/11/19 no acute events  2/12/19 +back pain, waiting for brace    Review of system- All 10 systems reviewed and is as per HPI otherwise negative.     Vital Signs Last 24 Hrs  T(C): 36.9 (12 Feb 2019 10:50), Max: 36.9 (12 Feb 2019 05:26)  T(F): 98.4 (12 Feb 2019 10:50), Max: 98.4 (12 Feb 2019 05:26)  HR: 72 (12 Feb 2019 10:50) (51 - 77)  BP: 129/61 (12 Feb 2019 10:50) (115/53 - 129/61)  BP(mean): --  RR: 18 (12 Feb 2019 10:50) (18 - 18)  SpO2: 100% (12 Feb 2019 10:50) (95% - 100%)    LABS:    RADIOLOGY & ADDITIONAL TESTS:  EXAM:  MR SPINE LUMBAR                        PROCEDURE DATE:  02/11/2019    INTERPRETATION:  INDICATIONS:  Right-sided sciatica for 2 to 3 weeks.      TECHNIQUE:  Multiplanar multi sequential images of the lumbosacral spine   were obtained. No IV contrast was administered.     COMPARISON EXAMINATION: Lumbar CT dated 2/9/2019 and abdominal/pelvic CT   dated 4/19/2017.      FINDINGS:    VERTEBRAL BODIES/ALIGNMENT:  Normal lumbar lordosis preserved. Unchanged   chronic mild-moderate superior endplate compression deformity at L5 with   mild retropulsion resulting in mild spinal canal stenosis. Remaining   lumbar vertebral bodies heights are preserved. There is minimal   anterolisthesis of L4 upon L5 without spondylolysis. There is edema   within the S1-S2 endplates and bilateral sacral ala, suggestive of   nondisplaced insufficiency sacral fractures.    SPINAL CANAL/NEURAL FORAMEN:  There is no high-grade spinal canal/neural   foraminal stenosis.     DISCS:  There is mild multilevel intervertebral disc height loss and   diffuse disc desiccation.    T12-L1:Within normal limits.    L1-L2:  Within normal limits.    L2-L3: Small disc bulge resulting in minimal impression upon the ventral   thecal sac and mild bilateral neural foraminal stenosis, with contacting   the left exiting L2 nerve root.    L3-L4:  Small disc bulge, facet arthrosis and ligament inflammatory   redundancy resulting in mild-moderate spinal canal and bilateral neural   foraminal stenosis with contacting the bilateral exiting L3 nerve roots.    L4-L5: Minimal anterolisthesis, central bulge, minimal retropulsion of   superior endplate of L5, and facet joint arthrosis resulting in   mild-moderate spinal canal and left neural foraminal stenosis. Findings   also cause Mild right neural foraminal stenosis. .    L5-S1:  Small disc bulge and facet arthrosis resulting in mild bilateral   neural foraminal stenosis. No spinal canal stenosis.    MISCELLANEOUS: Bilateral peripelvic cyst are again noted. There are a few   bilateral parenchymal cysts largest in the left measuring up to 2.3 cm   largest on the right measuring up to 1.2 cm. Nonspecific partially imaged   minimally distended intrahepatic biliary ducts, with CBD being normal in   diameter measuring up to 0.8 cm, correlate with LFTs.      IMPRESSION:    Findings suggesting nondisplaced insufficiency fractures of the bilateral   sacral ala and at S1-S2 endplates. If clinically indicated a nuclear bone   scan can be performed for further evaluation.    No acute lumbar spine pathology or fracture. No high-grade spinal   canal/neural foraminal stenosis.    Unchanged chronic mild-moderate superior endplate compression deformity   at L5, with mild retropulsion resulting in mild spinal canal stenosis.    Mild multilevel degenerative disc disease/spondylosis, detailed above.      PHYSICAL EXAM:  GENERAL: NAD, well-groomed, well-developed  HEAD:  Atraumatic, Normocephalic  EYES: EOMI, PERRLA, conjunctiva and sclera clear  HEENT: Moist mucous membranes  NECK: Supple, No JVD  NERVOUS SYSTEM:  Alert & Oriented X3, Motor Strength 5/5 B/L upper and lower extremities; DTRs 2+ intact and symmetric  CHEST/LUNG: Clear to auscultation bilaterally; No rales, rhonchi, wheezing, or rubs  HEART: Regular rate and rhythm; No murmurs, rubs, or gallops  ABDOMEN: Soft, Nontender, Nondistended; Bowel sounds present  GENITOURINARY- Voiding, no palpable bladder  EXTREMITIES:  2+ Peripheral Pulses, No clubbing, cyanosis, or edema  MUSCULOSKELTAL- No muscle tenderness,   SKIN-no rash, no lesion  CNS- alert, oriented X3, non focal     MEDICATIONS  (STANDING):  aspirin  chewable 81 milliGRAM(s) Oral daily  cholecalciferol 5000 Unit(s) Oral daily  cyanocobalamin 1000 MICROGram(s) Oral daily  diazepam    Tablet 2 milliGRAM(s) Oral once  docusate sodium 100 milliGRAM(s) Oral three times a day  exemestane 25 milliGRAM(s) Oral daily  folic acid 3 milliGRAM(s) Oral daily  levothyroxine 25 MICROGram(s) Oral daily  lidocaine   Patch 1 Patch Transdermal daily  pantoprazole    Tablet 40 milliGRAM(s) Oral daily  predniSONE   Tablet 5 milliGRAM(s) Oral daily    MEDICATIONS  (PRN):  acetaminophen   Tablet .. 650 milliGRAM(s) Oral every 6 hours PRN Temp greater or equal to 38.5C (101.3F), Moderate Pain (4 - 6)  morphine  - Injectable 2 milliGRAM(s) IV Push every 4 hours PRN Severe Pain (7 - 10)  ondansetron Injectable 4 milliGRAM(s) IV Push every 6 hours PRN Nausea  oxyCODONE    IR 5 milliGRAM(s) Oral four times a day PRN Moderate Pain (4 - 6)  polyvinyl alcohol 1.4%/povidone 0.6% Ophthalmic Solution - Peds 1 Drop(s) Both EYES two times a day PRN Dry Eyes  senna 2 Tablet(s) Oral at bedtime PRN Constipation    Assessment/Plan  #Back pain  #B/l non-displaced sacral ala fractures/S1-S2 endplates  Spine eval appreciated  MRI LS spine reviewed  Pain meds prn, lidoderm patch  Brace  Cont PT- likely [facility] on discharge    #RA  Con PO steroids    #Hypothyroidism- cont synthroid    #H/o ICH- daughter refused SQ Heparin    #Dispo- cont PT, likely [facility] today-tomorrow if bed is available.
CC-  Worsening of back pain x2 weeks. (10 Feb 2019 10:59)    HPI:  Patient is a 89 yrs old women with h/o RA, on follow up with a rheumatologist presented to the ED from [facility] with c/o sever back pain and inability to get out of her bed sec to pain. Patient came to the ED 2 weeks back with c/o sever back pain that she noticed after waking up from sleep. She was found to have an L1 fracture acute and was discharged home with medrol pack and percocet.  Patient sustained a fall 2 weeks prior to that but states that she was fine after the fall.  She is here with family. Stays alone at home with a disabled daughter whom she took care of up until she had the back pain.  States that at baseline, she has difficulty turning around in the bed because pain in both hip. It got worse after the fall. Over the past two weeks, pain was so intense 9/10 severity occassionally travelling to her legs. No sensory impairment. No difficulty moving her legs except from pain.  No fever chills or rigor. No diarrhaea or constipation. Regular BM per patient.  Noticed bladder incontinence lately. (09 Feb 2019 19:07)    2/10/19- +RT-sided sciatica  2/11/19 no acute events    Review of system- All 10 systems reviewed and is as per HPI otherwise negative.     Vital Signs Last 24 Hrs  T(C): 36.8 (11 Feb 2019 10:54), Max: 36.8 (11 Feb 2019 10:54)  T(F): 98.3 (11 Feb 2019 10:54), Max: 98.3 (11 Feb 2019 10:54)  HR: 85 (11 Feb 2019 10:54) (85 - 86)  BP: 157/74 (11 Feb 2019 10:54) (149/66 - 157/74)  BP(mean): --  RR: 18 (11 Feb 2019 10:54) (18 - 18)  SpO2: 98% (11 Feb 2019 10:54) (96% - 98%)    LABS:                        12.0   5.71  )-----------( 210      ( 10 Feb 2019 08:21 )             37.5     144    |  108    |  19     ----------------------------<  116    4.2     |  31     |  0.71     Ca    8.3        10 Feb 2019 08:21    RADIOLOGY & ADDITIONAL TESTS:      PHYSICAL EXAM:  GENERAL: NAD, well-groomed, well-developed  HEAD:  Atraumatic, Normocephalic  EYES: EOMI, PERRLA, conjunctiva and sclera clear  HEENT: Moist mucous membranes  NECK: Supple, No JVD  NERVOUS SYSTEM:  Alert & Oriented X3, Motor Strength 5/5 B/L upper and lower extremities; DTRs 2+ intact and symmetric  CHEST/LUNG: Clear to auscultation bilaterally; No rales, rhonchi, wheezing, or rubs  HEART: Regular rate and rhythm; No murmurs, rubs, or gallops  ABDOMEN: Soft, Nontender, Nondistended; Bowel sounds present  GENITOURINARY- Voiding, no palpable bladder  EXTREMITIES:  2+ Peripheral Pulses, No clubbing, cyanosis, or edema  MUSCULOSKELTAL- No muscle tenderness,   SKIN-no rash, no lesion  CNS- alert, oriented X3, non focal     MEDICATIONS  (STANDING):  aspirin  chewable 81 milliGRAM(s) Oral daily  cholecalciferol 5000 Unit(s) Oral daily  cyanocobalamin 1000 MICROGram(s) Oral daily  diazepam    Tablet 2 milliGRAM(s) Oral once  docusate sodium 100 milliGRAM(s) Oral three times a day  exemestane 25 milliGRAM(s) Oral daily  folic acid 3 milliGRAM(s) Oral daily  levothyroxine 25 MICROGram(s) Oral daily  lidocaine   Patch 1 Patch Transdermal daily  pantoprazole    Tablet 40 milliGRAM(s) Oral daily  predniSONE   Tablet 5 milliGRAM(s) Oral daily    MEDICATIONS  (PRN):  acetaminophen   Tablet .. 650 milliGRAM(s) Oral every 6 hours PRN Temp greater or equal to 38.5C (101.3F), Moderate Pain (4 - 6)  morphine  - Injectable 2 milliGRAM(s) IV Push every 4 hours PRN Severe Pain (7 - 10)  ondansetron Injectable 4 milliGRAM(s) IV Push every 6 hours PRN Nausea  oxyCODONE    IR 5 milliGRAM(s) Oral four times a day PRN Moderate Pain (4 - 6)  polyvinyl alcohol 1.4%/povidone 0.6% Ophthalmic Solution - Peds 1 Drop(s) Both EYES two times a day PRN Dry Eyes  senna 2 Tablet(s) Oral at bedtime PRN Constipation    Assessment/Plan  #Back pain  #B/l non-displaced sacral ala fractures  Spine eval appreciated  MRI LS spine reviewed  Pain meds prn, lidoderm patch  Cont PT- likely [facility] on discharge    #RA  Con PO steroids    #Hypothyroidism- cont synthroid    #H/o ICH- daughter refused SQ Heparin    #Dispo- cont PT, likely [facility] tomorrow
rt LBP  not as bad  brace delivered  needs minor adjustments  nv intact  \  oob walking    MRI + fx S1/S2  lumbar stenosis.....mod severe

## 2019-02-12 NOTE — DISCHARGE NOTE ADULT - HOSPITAL COURSE
Patient presented with back pain and difficulty ambulating. Seen by spine, found to have b/l sacral ala fractures and S1-S2 endplate fractures. Brace, needs [facility]. Outpatient f/u with DR Stewart

## 2019-02-12 NOTE — DISCHARGE NOTE ADULT - CARE PROVIDER_API CALL
Irineo Stewart)  Orthopaedic Surgery  [address] [place] [address]  Phone: [phone]  Fax: [phone]  Follow Up Time:     Toya Greenfield (DO)  Family Medicine  [address] [place] [address], [address] [place] [address]  Phone: [phone]  Fax: [phone]  Follow Up Time:

## 2019-02-12 NOTE — DISCHARGE NOTE ADULT - PATIENT PORTAL LINK FT
You can access the [facility] Patient Portal, offered by [facility], by registering with the following website: [email or web address]

## 2019-02-12 NOTE — DISCHARGE NOTE ADULT - MEDICATION SUMMARY - MEDICATIONS TO TAKE
I will START or STAY ON the medications listed below when I get home from the hospital:    predniSONE 5 mg oral tablet  -- 1 tab(s) by mouth once a day  -- Indication: For rheumatoid arthritis    aspirin 81 mg oral tablet  -- 1 tab(s) by mouth once a day  -- Indication: For cardiac    acetaminophen 325 mg oral tablet  -- 2 tab(s) by mouth every 6 hours, As needed, Temp greater or equal to 38.5C (101.3F), Moderate Pain (4 - 6)  -- Indication: For prn for pain    oxyCODONE 5 mg oral tablet  -- 1 tab(s) by mouth 4 times a day, As needed, Moderate Pain (4 - 6)  -- Indication: For prn for pain    oxyCODONE 10 mg oral tablet  -- 1 tab(s) by mouth every 6 hours, As Needed for severe pain  -- Indication: For prn for pain    methotrexate 2.5 mg oral tablet  -- 8  by mouth once a week  -- Indication: For rheumatoid arthritis    exemestane 25 mg oral tablet  -- 1 tab(s) by mouth once a day  -- Indication: For breast ca    lidocaine 5% topical film  -- Apply on skin to affected area once a day  -- Indication: For pain    docusate sodium 100 mg oral capsule  -- 1 cap(s) by mouth 3 times a day  -- Indication: For bowel regimen    senna oral tablet  -- 2 tab(s) by mouth once a day (at bedtime), As needed, Constipation  -- Indication: For bowel regimen    Systane ophthalmic solution  -- 1 drop(s) to each affected eye 2 times a day, As Needed  -- Indication: For eye drops    pantoprazole 40 mg oral delayed release tablet  -- 1 tab(s) by mouth once a day  -- Indication: For GERD    Synthroid 25 mcg (0.025 mg) oral tablet  -- 1 tab(s) by mouth once a day  -- Indication: For thyroid    folic acid 1 mg oral tablet  -- 3 tab(s) by mouth once a day  -- Indication: For vitamin    Vitamin D3 5000 intl units oral capsule  -- 1 cap(s) by mouth once a day  -- Indication: For vitamin    Vitamin B-12 1000 mcg oral tablet  -- 1 tab(s) by mouth once a day  -- Indication: For vitamin

## 2019-02-15 DIAGNOSIS — I47.1 SUPRAVENTRICULAR TACHYCARDIA: ICD-10-CM

## 2019-02-15 DIAGNOSIS — I50.9 HEART FAILURE, UNSPECIFIED: ICD-10-CM

## 2019-02-15 DIAGNOSIS — S32.110A NONDISPLACED ZONE I FRACTURE OF SACRUM, INITIAL ENCOUNTER FOR CLOSED FRACTURE: ICD-10-CM

## 2019-02-15 DIAGNOSIS — Z85.3 PERSONAL HISTORY OF MALIGNANT NEOPLASM OF BREAST: ICD-10-CM

## 2019-02-15 DIAGNOSIS — M06.9 RHEUMATOID ARTHRITIS, UNSPECIFIED: ICD-10-CM

## 2019-02-15 DIAGNOSIS — R62.7 ADULT FAILURE TO THRIVE: ICD-10-CM
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
## 2019-02-15 DIAGNOSIS — S32.120A: ICD-10-CM

## 2019-02-15 DIAGNOSIS — E03.9 HYPOTHYROIDISM, UNSPECIFIED: ICD-10-CM

## 2019-02-15 DIAGNOSIS — M48.061 SPINAL STENOSIS, LUMBAR REGION WITHOUT NEUROGENIC CLAUDICATION: ICD-10-CM

## 2019-02-15 DIAGNOSIS — I11.0 HYPERTENSIVE HEART DISEASE WITH HEART FAILURE: ICD-10-CM

## 2019-02-15 DIAGNOSIS — I48.91 UNSPECIFIED ATRIAL FIBRILLATION: ICD-10-CM

## 2019-02-15 DIAGNOSIS — M54.31 SCIATICA, RIGHT SIDE: ICD-10-CM
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
## 2019-04-25 ENCOUNTER — INPATIENT (INPATIENT)
Facility: HOSPITAL | Age: 84
LOS: 4 days | Discharge: NURSING, REHAB OR LONG-TERM CARE | End: 2019-04-30
Attending: INTERNAL MEDICINE | Admitting: INTERNAL MEDICINE
Payer: MEDICARE

## 2019-04-25 VITALS
WEIGHT: 139.99 LBS | TEMPERATURE: 98 F | DIASTOLIC BLOOD PRESSURE: 59 MMHG | SYSTOLIC BLOOD PRESSURE: 142 MMHG | OXYGEN SATURATION: 100 % | RESPIRATION RATE: 19 BRPM | HEIGHT: 64 IN | HEART RATE: 72 BPM

## 2019-04-25 DIAGNOSIS — Z98.890 OTHER SPECIFIED POSTPROCEDURAL STATES: Chronic | ICD-10-CM

## 2019-04-25 DIAGNOSIS — Z96.643 PRESENCE OF ARTIFICIAL HIP JOINT, BILATERAL: Chronic | ICD-10-CM

## 2019-04-25 DIAGNOSIS — Z90.49 ACQUIRED ABSENCE OF OTHER SPECIFIED PARTS OF DIGESTIVE TRACT: Chronic | ICD-10-CM

## 2019-04-25 DIAGNOSIS — Z98.49 CATARACT EXTRACTION STATUS, UNSPECIFIED EYE: Chronic | ICD-10-CM

## 2019-04-25 LAB
BASOPHILS # BLD AUTO: 0.02 K/UL — SIGNIFICANT CHANGE UP (ref 0–0.2)
BASOPHILS NFR BLD AUTO: 0.2 % — SIGNIFICANT CHANGE UP (ref 0–2)
EOSINOPHIL # BLD AUTO: 0.04 K/UL — SIGNIFICANT CHANGE UP (ref 0–0.5)
EOSINOPHIL NFR BLD AUTO: 0.5 % — SIGNIFICANT CHANGE UP (ref 0–6)
HCT VFR BLD CALC: 38.8 % — SIGNIFICANT CHANGE UP (ref 34.5–45)
HGB BLD-MCNC: 12.2 G/DL — SIGNIFICANT CHANGE UP (ref 11.5–15.5)
IMM GRANULOCYTES NFR BLD AUTO: 0.2 % — SIGNIFICANT CHANGE UP (ref 0–1.5)
LYMPHOCYTES # BLD AUTO: 1.4 K/UL — SIGNIFICANT CHANGE UP (ref 1–3.3)
LYMPHOCYTES # BLD AUTO: 16 % — SIGNIFICANT CHANGE UP (ref 13–44)
MCHC RBC-ENTMCNC: 31.4 GM/DL — LOW (ref 32–36)
MCHC RBC-ENTMCNC: 32.9 PG — SIGNIFICANT CHANGE UP (ref 27–34)
MCV RBC AUTO: 104.6 FL — HIGH (ref 80–100)
MONOCYTES # BLD AUTO: 0.88 K/UL — SIGNIFICANT CHANGE UP (ref 0–0.9)
MONOCYTES NFR BLD AUTO: 10 % — SIGNIFICANT CHANGE UP (ref 2–14)
NEUTROPHILS # BLD AUTO: 6.4 K/UL — SIGNIFICANT CHANGE UP (ref 1.8–7.4)
NEUTROPHILS NFR BLD AUTO: 73.1 % — SIGNIFICANT CHANGE UP (ref 43–77)
NRBC # BLD: 0 /100 WBCS — SIGNIFICANT CHANGE UP (ref 0–0)
PLATELET # BLD AUTO: 218 K/UL — SIGNIFICANT CHANGE UP (ref 150–400)
RBC # BLD: 3.71 M/UL — LOW (ref 3.8–5.2)
RBC # FLD: 15.3 % — HIGH (ref 10.3–14.5)
WBC # BLD: 8.76 K/UL — SIGNIFICANT CHANGE UP (ref 3.8–10.5)
WBC # FLD AUTO: 8.76 K/UL — SIGNIFICANT CHANGE UP (ref 3.8–10.5)

## 2019-04-25 PROCEDURE — 93971 EXTREMITY STUDY: CPT | Mod: 26,LT

## 2019-04-25 PROCEDURE — 73564 X-RAY EXAM KNEE 4 OR MORE: CPT | Mod: 26,LT

## 2019-04-25 PROCEDURE — 73721 MRI JNT OF LWR EXTRE W/O DYE: CPT | Mod: 26,LT

## 2019-04-25 PROCEDURE — 73552 X-RAY EXAM OF FEMUR 2/>: CPT | Mod: 26,LT

## 2019-04-25 PROCEDURE — 99285 EMERGENCY DEPT VISIT HI MDM: CPT

## 2019-04-25 RX ORDER — PREGABALIN 225 MG/1
1000 CAPSULE ORAL DAILY
Qty: 0 | Refills: 0 | Status: DISCONTINUED | OUTPATIENT
Start: 2019-04-25 | End: 2019-04-30

## 2019-04-25 RX ORDER — OXYCODONE HYDROCHLORIDE 5 MG/1
1 TABLET ORAL
Qty: 0 | Refills: 0 | COMMUNITY

## 2019-04-25 RX ORDER — FOLIC ACID 0.8 MG
3 TABLET ORAL DAILY
Qty: 0 | Refills: 0 | Status: DISCONTINUED | OUTPATIENT
Start: 2019-04-25 | End: 2019-04-30

## 2019-04-25 RX ORDER — EXEMESTANE 25 MG/1
1 TABLET, SUGAR COATED ORAL
Qty: 0 | Refills: 0 | COMMUNITY

## 2019-04-25 RX ORDER — OXYCODONE AND ACETAMINOPHEN 5; 325 MG/1; MG/1
1 TABLET ORAL EVERY 6 HOURS
Qty: 0 | Refills: 0 | Status: DISCONTINUED | OUTPATIENT
Start: 2019-04-25 | End: 2019-04-26

## 2019-04-25 RX ORDER — ASPIRIN/CALCIUM CARB/MAGNESIUM 324 MG
81 TABLET ORAL DAILY
Qty: 0 | Refills: 0 | Status: DISCONTINUED | OUTPATIENT
Start: 2019-04-25 | End: 2019-04-30

## 2019-04-25 RX ORDER — OXYCODONE HYDROCHLORIDE 5 MG/1
5 TABLET ORAL ONCE
Qty: 0 | Refills: 0 | Status: DISCONTINUED | OUTPATIENT
Start: 2019-04-25 | End: 2019-04-25

## 2019-04-25 RX ORDER — DOCUSATE SODIUM 100 MG
100 CAPSULE ORAL THREE TIMES A DAY
Qty: 0 | Refills: 0 | Status: DISCONTINUED | OUTPATIENT
Start: 2019-04-25 | End: 2019-04-30

## 2019-04-25 RX ORDER — LEVOTHYROXINE SODIUM 125 MCG
25 TABLET ORAL DAILY
Qty: 0 | Refills: 0 | Status: DISCONTINUED | OUTPATIENT
Start: 2019-04-25 | End: 2019-04-30

## 2019-04-25 RX ORDER — OXYCODONE HYDROCHLORIDE 5 MG/1
5 TABLET ORAL
Qty: 0 | Refills: 0 | Status: DISCONTINUED | OUTPATIENT
Start: 2019-04-25 | End: 2019-04-26

## 2019-04-25 RX ORDER — OXYCODONE AND ACETAMINOPHEN 5; 325 MG/1; MG/1
2 TABLET ORAL EVERY 6 HOURS
Qty: 0 | Refills: 0 | Status: DISCONTINUED | OUTPATIENT
Start: 2019-04-25 | End: 2019-04-26

## 2019-04-25 RX ORDER — SENNA PLUS 8.6 MG/1
2 TABLET ORAL AT BEDTIME
Qty: 0 | Refills: 0 | Status: DISCONTINUED | OUTPATIENT
Start: 2019-04-25 | End: 2019-04-30

## 2019-04-25 RX ADMIN — Medication 100 MILLIGRAM(S): at 22:42

## 2019-04-25 RX ADMIN — OXYCODONE HYDROCHLORIDE 5 MILLIGRAM(S): 5 TABLET ORAL at 15:33

## 2019-04-25 RX ADMIN — OXYCODONE HYDROCHLORIDE 5 MILLIGRAM(S): 5 TABLET ORAL at 11:18

## 2019-04-25 RX ADMIN — OXYCODONE HYDROCHLORIDE 5 MILLIGRAM(S): 5 TABLET ORAL at 11:32

## 2019-04-25 NOTE — ED PROVIDER NOTE - PHYSICAL EXAMINATION
Constitutional: mild distress AAOx3  Eyes: PERRLA EOMI  Head: Normocephalic atraumatic  Mouth: MMM  Cardiac: regular rate   Resp: Lungs CTAB  GI: Abd s/nt/nd  Neuro: CN2-12 intact  Skin: No rashes   MSK: extensor mechanism intact. +TTP to left lateral knee with swelling. No redness or warmth to the knee.

## 2019-04-25 NOTE — ED ADULT TRIAGE NOTE - CHIEF COMPLAINT QUOTE
Pt BIBA, s/p left injury yesterday, pt states she bending to  her cane and felt a pop in knee, 10/10 pain, unable to ambulate. Pt denies LOC, did not fall.

## 2019-04-25 NOTE — ED ADULT TRIAGE NOTE - ESI TRIAGE ACUITY LEVEL, MLM
2 H/O hernia repair  Umbilical and LIH Repairs 2015 or 2016  Penile cyst  Excision of Cyst 3+ yrs ago

## 2019-04-25 NOTE — ED PROVIDER NOTE - PROGRESS NOTE DETAILS
xray without fx but patient cannot ambulate - pt endorsed to Dr. castle for rehab - ortho paged. Omer Mario M.D., Attending Physician

## 2019-04-25 NOTE — H&P ADULT - NSHPPHYSICALEXAM_GEN_ALL_CORE
Vital Signs Last 24 Hrs  T(C): 36.6 (25 Apr 2019 10:03), Max: 36.6 (25 Apr 2019 10:03)  T(F): 97.9 (25 Apr 2019 10:03), Max: 97.9 (25 Apr 2019 10:03)  HR: 61 (25 Apr 2019 15:21) (61 - 72)  BP: 123/62 (25 Apr 2019 15:21) (123/62 - 142/59)  BP(mean): --  RR: 17 (25 Apr 2019 15:21) (17 - 19)  SpO2: 99% (25 Apr 2019 15:21) (99% - 100%)    PHYSICAL EXAM:    Constitutional: NAD, awake and alert, well-developed  HEENT: PERR, EOMI, Normal Hearing, MMM  Neck: Soft and supple  Respiratory: Breath sounds are clear bilaterally, No wheezing, rales or rhonchi  Cardiovascular: S1 and S2, regular rate and rhythm, no Murmurs, gallops or rubs  Gastrointestinal: Bowel Sounds present, soft, nontender, nondistended, no guarding, no rebound  Extremities: No peripheral edema  Neurological: A/O x 3, no focal deficits in my limited exam  Skin: No rashes Vital Signs Last 24 Hrs  T(C): 36.6 (25 Apr 2019 10:03), Max: 36.6 (25 Apr 2019 10:03)  T(F): 97.9 (25 Apr 2019 10:03), Max: 97.9 (25 Apr 2019 10:03)  HR: 61 (25 Apr 2019 15:21) (61 - 72)  BP: 123/62 (25 Apr 2019 15:21) (123/62 - 142/59)  BP(mean): --  RR: 17 (25 Apr 2019 15:21) (17 - 19)  SpO2: 99% (25 Apr 2019 15:21) (99% - 100%)    PHYSICAL EXAM:    Constitutional: NAD, awake and alert, well-developed  HEENT: PERR, EOMI, Normal Hearing, MMM  Neck: Soft and supple  Respiratory: Breath sounds are clear bilaterally, No wheezing, rales or rhonchi  Cardiovascular: S1 and S2, regular rate and rhythm, no Murmurs, gallops or rubs  Gastrointestinal: Bowel Sounds present, soft, nontender, nondistended, no guarding, no rebound  Extremities: No peripheral edema, left knee tenderness, warmth, edema  Neurological: A/O x 3, no focal deficits in my limited exam  Skin: No rashes

## 2019-04-25 NOTE — ED PROVIDER NOTE - CLINICAL SUMMARY MEDICAL DECISION MAKING FREE TEXT BOX
89 y/o female with h/o of Afib, HTN presents to the ED with left knee pain. Pt states she was bending down yesterday to  her cane when she felt something pop in her knee. Did not fall. Woke up this morning with significant pain in the left knee and difficulty ambulating. Here, pt with TTP to left lateral knee with swelling, but extensor mechanisms is intact. Will obtain XR to r/o fracture, pain control, and reassess.

## 2019-04-25 NOTE — H&P ADULT - ASSESSMENT
89 yrs old women with h/o recent diagnosis of acute fracture of L1 presented with severe back pain, with limited mobility and failure to thrive    1. Acute L1 fracture  2. Sever back pain  3. Failure to thrive  4. H/o Rheumatoid arthritis    Patient is back in the ED with sever back pain despite being on medrol pack and percocet. She has limited mobility sec to pain. Plan is to admit her to medicine.  Started her on morphine 2 mg IV q4 hrs with oxycodone 5 mg every 4 hrs. Also started her on lidoderm patch.  Given her worsening of pain, compared to her baseline, will get CT lumbar spine  Pain service consulted. Need to optimizes her pain medications and transition to PO prior to discharge  Ortho consulted and may need braces. Await ortho recs. She doesn't have any focal deficit at this time.  Once pain is better controlled, will need PT OT eval and treat.  Stool softeners with narcotics  Heparin for DVT prophylaxis   consulted.  Patient on methotrexate 2.5 mg. She takes the medications every Saturday. States that she took it today.    D/w family. Updated the plan of care with them. 89 year old woman with left knee pain    Left knee pain:  -doppler knee neg  -f/u MRI  -pain management  -ortho eval  -PT    Hx back pain:  -supportive care    B/l non-displaced sacral ala fractures/S1-S2 endplates  Spine eval appreciated  MRI LS spine reviewed  Pain meds prn, lidoderm patch  Brace  Cont PT- likely [facility] on discharge    RA:  Con PO steroids  -methotrexate qTuesday    Hypothyroidism:  - cont synthroid    DVT ppx:  -SCDs    Code status:  -Full Code

## 2019-04-25 NOTE — ED PROVIDER NOTE - NS ED ROS FT
Constitutional: No fever or chills  Eyes: No visual changes  HEENT: No throat pain  CV: No chest pain  Resp: No SOB no cough  GI: No abd pain, nausea or vomiting  : No dysuria  MSK: +left knee pain   Skin: No rash  Neuro: No headache

## 2019-04-25 NOTE — H&P ADULT - NSICDXPASTSURGICALHX_GEN_ALL_CORE_FT
PAST SURGICAL HISTORY:  H/O bilateral hip replacements     H/O umbilical hernia repair     History of appendectomy     S/P breast lumpectomy Left 2017    S/P cataract extraction B/L; 2013    S/P colonoscopy unsure of all dates; last one 2017    S/P vein stripping 1974

## 2019-04-25 NOTE — ED PROVIDER NOTE - OBJECTIVE STATEMENT
89 y/o female with a PMHx of Afib not on AC, left breast CA, CHF, Goiter, anemia, HTN, Hypothyroid, ICH, RA, SVT, Varicose veins h/o vein stripping, colonoscopy, appendectomy, left breast lumpectomy, umbilical hernia repair, bilateral hip replacements presents to the ED c/o left knee pain since yesterday. Pt was in the shower when she bent over to pick her bone and felt left knee pain. States she felt something pop. Pt woke up this morning with worsening pain and difficulty ambulating. Denies LOC or fall. Former smoker. PMD: Dr. Greenfield.

## 2019-04-25 NOTE — H&P ADULT - NSICDXPASTMEDICALHX_GEN_ALL_CORE_FT
PAST MEDICAL HISTORY:  Adhesive capsulitis of left shoulder     Afib     Bilateral hearing loss, unspecified hearing loss type     Breast cancer left    CHF (congestive heart failure)     Dry eyes, bilateral     Goiter     History of anemia s/p hip replacements    History of anemia     HTN (hypertension)     Hypothyroid     ICH (intracerebral hemorrhage) 2014: Followed by Dr. Cesar Leigh of colon Followed by Dr. Bang    Melanoma of eye, right Followed in [facility] by Opthamologist    Rheumatoid arthritis     SVT (supraventricular tachycardia)     Varicose veins

## 2019-04-25 NOTE — ED ADULT NURSE NOTE - NSIMPLEMENTINTERV_GEN_ALL_ED
Implemented All Fall with Harm Risk Interventions:  [place] to call system. Call bell, personal items and telephone within reach. Instruct patient to call for assistance. Room bathroom lighting operational. Non-slip footwear when patient is off stretcher. Physically safe environment: no spills, clutter or unnecessary equipment. Stretcher in lowest position, wheels locked, appropriate side rails in place. Provide visual cue, wrist band, yellow gown, etc. Monitor gait and stability. Monitor for mental status changes and reorient to person, place, and time. Review medications for side effects contributing to fall risk. Reinforce activity limits and safety measures with patient and family. Provide visual clues: red socks.

## 2019-04-25 NOTE — H&P ADULT - NSHPLABSRESULTS_GEN_ALL_CORE
CT lumbar spine 1/25/19: acute compression fracture of L1 spine with loss of height 25% with mild retropulsion 12.2   8.76  )-----------( 218      ( 25 Apr 2019 12:58 )             38.8     04-25    142  |  109<H>  |  13  ----------------------------<  110<H>  3.7   |  29  |  0.64    Ca    8.5      25 Apr 2019 13:53    TPro  7.0  /  Alb  3.4  /  TBili  1.3<H>  /  DBili  x   /  AST  12<L>  /  ALT  19  /  AlkPhos  88  04-25    CAPILLARY BLOOD GLUCOSE        LIVER FUNCTIONS - ( 25 Apr 2019 13:53 )  Alb: 3.4 g/dL / Pro: 7.0 gm/dL / ALK PHOS: 88 U/L / ALT: 19 U/L / AST: 12 U/L / GGT: x           PT/INR - ( 25 Apr 2019 13:53 )   PT: 11.7 sec;   INR: 1.05 ratio         PTT - ( 25 Apr 2019 13:53 )  PTT:28.7 sec

## 2019-04-25 NOTE — ED PROVIDER NOTE - NS_ ATTENDINGSCRIBEDETAILS _ED_A_ED_FT
I, Omer Mario MD,  performed the initial face to face bedside interview with this patient regarding history of present illness, review of symptoms and relevant past medical, social and family history.  I completed an independent physical examination.  I was the initial provider who evaluated this patient.  The history, relevant review of systems, past medical and surgical history, medical decision making, and physical examination was documented by the scribe in my presence and I attest to the accuracy of the documentation.

## 2019-04-25 NOTE — ED PROVIDER NOTE - PMH
Adhesive capsulitis of left shoulder    Afib    Bilateral hearing loss, unspecified hearing loss type    Breast cancer  left  CHF (congestive heart failure)    Dry eyes, bilateral    Goiter    History of anemia  s/p hip replacements  History of anemia    HTN (hypertension)    Hypothyroid    ICH (intracerebral hemorrhage)  2014: Followed by Dr. Cesar Leigh of colon  Followed by Dr. Bang  Melanoma of eye, right  [facility]  Rheumatoid arthritis    SVT (supraventricular tachycardia)    Varicose veins

## 2019-04-26 LAB — ERYTHROCYTE [SEDIMENTATION RATE] IN BLOOD: 28 MM/HR — HIGH (ref 0–20)

## 2019-04-26 RX ORDER — CHOLECALCIFEROL (VITAMIN D3) 125 MCG
5000 CAPSULE ORAL DAILY
Qty: 0 | Refills: 0 | Status: DISCONTINUED | OUTPATIENT
Start: 2019-04-26 | End: 2019-04-30

## 2019-04-26 RX ORDER — ACETAMINOPHEN 500 MG
650 TABLET ORAL EVERY 6 HOURS
Qty: 0 | Refills: 0 | Status: DISCONTINUED | OUTPATIENT
Start: 2019-04-26 | End: 2019-04-30

## 2019-04-26 RX ORDER — OXYCODONE HYDROCHLORIDE 5 MG/1
5 TABLET ORAL EVERY 6 HOURS
Qty: 0 | Refills: 0 | Status: DISCONTINUED | OUTPATIENT
Start: 2019-04-26 | End: 2019-04-28

## 2019-04-26 RX ADMIN — Medication 81 MILLIGRAM(S): at 11:20

## 2019-04-26 RX ADMIN — Medication 5 MILLIGRAM(S): at 05:34

## 2019-04-26 RX ADMIN — SENNA PLUS 2 TABLET(S): 8.6 TABLET ORAL at 22:15

## 2019-04-26 RX ADMIN — Medication 100 MILLIGRAM(S): at 22:15

## 2019-04-26 RX ADMIN — PREGABALIN 1000 MICROGRAM(S): 225 CAPSULE ORAL at 11:20

## 2019-04-26 RX ADMIN — Medication 100 MILLIGRAM(S): at 05:34

## 2019-04-26 RX ADMIN — Medication 25 MICROGRAM(S): at 05:34

## 2019-04-26 RX ADMIN — Medication 5000 UNIT(S): at 17:33

## 2019-04-26 RX ADMIN — Medication 3 MILLIGRAM(S): at 11:20

## 2019-04-26 RX ADMIN — Medication 100 MILLIGRAM(S): at 13:14

## 2019-04-26 NOTE — PROGRESS NOTE ADULT - SUBJECTIVE AND OBJECTIVE BOX
CC: Left knee pain History of Present Illness: 	  Pt is a 89 yrs old women with h/o RA, and other comorbidities who presents with chief complaint of left knee pain.  Per pt on Tuesday she went to reach for her bone and thinks she may have done something to her knee.  Since yesterday she has been having excruciating knee pain, not improving so she decided to come to ED for further evaluation.  In ED: Pt unable to ambulate.  She was admitted for further diagnostic testing and treatment.       4/26: Pt more comfortable, denies knee pain while lying in bed.  Swelling improved. No fever, chills, n, v.  Found to have medial meniscus tear.    REVIEW OF SYSTEMS: All other review of systems is negative unless indicated above.    Vital Signs Last 24 Hrs  T(C): 36.8 (26 Apr 2019 11:58), Max: 37.3 (26 Apr 2019 05:14)  T(F): 98.3 (26 Apr 2019 11:58), Max: 99.1 (26 Apr 2019 05:14)  HR: 72 (26 Apr 2019 11:58) (72 - 83)  BP: 126/42 (26 Apr 2019 11:58) (113/54 - 130/58)  BP(mean): --  RR: 17 (26 Apr 2019 11:58) (17 - 18)  SpO2: 96% (26 Apr 2019 11:58) (93% - 99%)    PHYSICAL EXAM:    Constitutional: NAD, awake and alert, frail, elderly  HEENT: PERR, EOMI, Normal Hearing, MMM  Neck: Soft and supple  Respiratory: Breath sounds are clear bilaterally, No wheezing, rales or rhonchi  Cardiovascular: S1 and S2, regular rate and rhythm, no Murmurs, gallops or rubs  Gastrointestinal: Bowel Sounds present, soft, nontender, nondistended, no guarding, no rebound  Extremities: left knee swelling improved, non-tender to touch, able to bend knee  Neurological: A/O x 3, no focal deficits in my limited exam  Skin: No rashes        MEDICATIONS  (STANDING):  aspirin  chewable 81 milliGRAM(s) Oral daily  cyanocobalamin 1000 MICROGram(s) Oral daily  docusate sodium 100 milliGRAM(s) Oral three times a day  folic acid 3 milliGRAM(s) Oral daily  levothyroxine 25 MICROGram(s) Oral daily  oxyCODONE    IR 5 milliGRAM(s) Oral every 3 hours  predniSONE   Tablet 5 milliGRAM(s) Oral daily    MEDICATIONS  (PRN):  oxyCODONE    5 mG/acetaminophen 325 mG 2 Tablet(s) Oral every 6 hours PRN Severe Pain (7 - 10)  oxyCODONE    5 mG/acetaminophen 325 mG 1 Tablet(s) Oral every 6 hours PRN Moderate Pain (4 - 6)  senna 2 Tablet(s) Oral at bedtime PRN Constipation                              12.2   8.76  )-----------( 218      ( 25 Apr 2019 12:58 )             38.8     04-25    142  |  109<H>  |  13  ----------------------------<  110<H>  3.7   |  29  |  0.64    Ca    8.5      25 Apr 2019 13:53    TPro  7.0  /  Alb  3.4  /  TBili  1.3<H>  /  DBili  x   /  AST  12<L>  /  ALT  19  /  AlkPhos  88  04-25    CAPILLARY BLOOD GLUCOSE        LIVER FUNCTIONS - ( 25 Apr 2019 13:53 )  Alb: 3.4 g/dL / Pro: 7.0 gm/dL / ALK PHOS: 88 U/L / ALT: 19 U/L / AST: 12 U/L / GGT: x           PT/INR - ( 25 Apr 2019 13:53 )   PT: 11.7 sec;   INR: 1.05 ratio         PTT - ( 25 Apr 2019 13:53 )  PTT:28.7 sec      < from: MR Knee No Cont, Left (04.25.19 @ 16:44) >  IMPRESSION:     Full-thickness radial tear at the posterior horn/posterior root junction   of the medial meniscus with extrusion of meniscal tissue into the medial   gutter. Moderate tibiofemoral compartment arthrosis.    Mild patellofemoral compartment arthrosis.    Mild sprain of the medial and lateral patellar retinacula.    Large knee joint effusion with synovitis. Fluid within the   semimembranous/medial gastrocnemius bursa with confluent seepage of fluid   along the superficial myofascia of the medial gastrocnemius muscle.    < end of copied text >      Assessment and Plan:  89 year old woman with left knee pain    Left knee pain: Due to medial meniscus tear with knee effusion/synovitis  -doppler knee neg  -MRI as above  -pain management  -supportive care  -ortho eval  -PT recommends [facility]    Hx back pain:  -supportive care    B/l non-displaced sacral ala fractures/S1-S2 endplates  Spine eval appreciated  MRI LS spine reviewed  Pain meds prn, lidoderm patch  Brace  Cont PT- likely [facility] on discharge    RA:  Con PO steroids  -methotrexate qTuesday    Hypothyroidism:  - cont synthroid    DVT ppx:  -SCDs    Code status:  -DNR/DNI    Dispo:  -d/c to [facility] once medically stable

## 2019-04-26 NOTE — PHYSICAL THERAPY INITIAL EVALUATION ADULT - CRITERIA FOR SKILLED THERAPEUTIC INTERVENTIONS
impairments found/risk reduction/prevention/functional limitations in following categories/predicted duration of therapy intervention/rehab potential/therapy frequency/anticipated equipment needs at discharge/anticipated discharge recommendation

## 2019-04-26 NOTE — PHYSICAL THERAPY INITIAL EVALUATION ADULT - RANGE OF MOTION EXAMINATION, REHAB EVAL
Left shoulder ~30 degree limited by pain, Both hips h/o MAGDALENA, left knee ranges painful/deficits as listed below

## 2019-04-26 NOTE — PHYSICAL THERAPY INITIAL EVALUATION ADULT - PRECAUTIONS/LIMITATIONS, REHAB EVAL
spinal precautions/back precs, recent B/l non-displaced sacral ala fractures/S1-S2 endplates/fall precautions

## 2019-04-26 NOTE — PHYSICAL THERAPY INITIAL EVALUATION ADULT - PERTINENT HX OF CURRENT PROBLEM, REHAB EVAL
Pt presents with chief complaint of left knee pain.  Per pt on Tuesday she went to reach for her cane and thinks she may have done something to her knee.  Since yesterday she has been having excruciating knee pain, not improving so she decided to come to ED for further evaluation.  MRI Left knee shows: full thickness radial tear at post root attachment of medial meniscus

## 2019-04-26 NOTE — PHYSICAL THERAPY INITIAL EVALUATION ADULT - DIAGNOSIS, PT EVAL
Left knee pain, unable to ambulate, MRI Left knee shows: full thickness radial tear at post root attachment of medial meniscus, B/l non-displaced sacral ala fracture /S1-S2 endplates

## 2019-04-27 RX ADMIN — Medication 650 MILLIGRAM(S): at 23:11

## 2019-04-27 RX ADMIN — Medication 650 MILLIGRAM(S): at 11:40

## 2019-04-27 RX ADMIN — Medication 650 MILLIGRAM(S): at 12:00

## 2019-04-27 RX ADMIN — Medication 3 MILLIGRAM(S): at 11:42

## 2019-04-27 RX ADMIN — Medication 5 MILLIGRAM(S): at 05:15

## 2019-04-27 RX ADMIN — Medication 81 MILLIGRAM(S): at 11:41

## 2019-04-27 RX ADMIN — Medication 100 MILLIGRAM(S): at 23:11

## 2019-04-27 RX ADMIN — Medication 650 MILLIGRAM(S): at 23:55

## 2019-04-27 RX ADMIN — Medication 650 MILLIGRAM(S): at 18:12

## 2019-04-27 RX ADMIN — Medication 650 MILLIGRAM(S): at 06:44

## 2019-04-27 RX ADMIN — Medication 5000 UNIT(S): at 11:41

## 2019-04-27 RX ADMIN — Medication 650 MILLIGRAM(S): at 17:42

## 2019-04-27 RX ADMIN — Medication 25 MICROGRAM(S): at 05:15

## 2019-04-27 RX ADMIN — Medication 100 MILLIGRAM(S): at 13:54

## 2019-04-27 RX ADMIN — Medication 650 MILLIGRAM(S): at 09:20

## 2019-04-27 RX ADMIN — PREGABALIN 1000 MICROGRAM(S): 225 CAPSULE ORAL at 11:42

## 2019-04-27 RX ADMIN — Medication 100 MILLIGRAM(S): at 05:15

## 2019-04-27 NOTE — CONSULT NOTE ADULT - SUBJECTIVE AND OBJECTIVE BOX
89yFemale c/o L knee pain for the last several days that makes it difficult to walk. Patient has a history of rheumatoid arthritis and bilateral hip replacements. Patient denies any new trauma, head hit or LOC. Patient denies numbness or tingling in the LLE. Denies fever, chills, or feeling sick. Patient denies any other injuries.    PMH:  Bilateral hearing loss, unspecified hearing loss type  History of anemia  Dry eyes, bilateral  SVT (supraventricular tachycardia)  Adhesive capsulitis of left shoulder  Mass of colon  Varicose veins  History of anemia  Melanoma of eye, right  Breast cancer  Goiter  HTN (hypertension)  Rheumatoid arthritis  Hypothyroid  ICH (intracerebral hemorrhage)  Afib  CHF (congestive heart failure)    PSH:  S/P breast lumpectomy  S/P colonoscopy  S/P vein stripping  S/P cataract extraction  History of appendectomy  H/O umbilical hernia repair  H/O bilateral hip replacements    AH:    Meds: See med rec    T(C): 36.7 (04-27-19 @ 05:43)  HR: 67 (04-27-19 @ 05:43)  BP: 117/56 (04-27-19 @ 05:43)  RR: 17 (04-27-19 @ 05:43)  SpO2: 95% (04-27-19 @ 05:43)  Wt(kg): --    PE LLE:  Skin intact, no ecchymosis, no erythema, palpable knee effusion, SILT L2-S1, +EHL/FHL/TA/Gastroc, +hip/ankle ROM, knee ROM slight limited 2/2 pain, DP+, soft compartments, no calf ttp, +log roll.    Secondary:  No TTP over bony landmarks, SILT BL, ROM intact BL, distal pulses palpable.    Imaging:  XR demonstrating no acute fracture  MRI L knee demonstrates chronic medial meniscus tear, synovitis, and patellofemoral arthrosis

## 2019-04-27 NOTE — CONSULT NOTE ADULT - ASSESSMENT
89F w/ degenerative changes of left knee  no acute fracture/dislocation or evidence of septic joint  recommend restarting patients home rheumatoid arthritis medication  analgesia  DVT ppx  WBAT  PT/OT  Incentive spirometry  No acute surgical intervention  FU with Dr. Edmondson or preferred orthopedist outpatient PRN  Orthopedic stable for DC

## 2019-04-27 NOTE — PROGRESS NOTE ADULT - SUBJECTIVE AND OBJECTIVE BOX
CC: Left knee pain History of Present Illness: 	  Pt is a 89 yrs old women with h/o RA, and other comorbidities who presents with chief complaint of left knee pain.  Per pt on Tuesday she went to reach for her bone and thinks she may have done something to her knee.  Since yesterday she has been having excruciating knee pain, not improving so she decided to come to ED for further evaluation.  In ED: Pt unable to ambulate.  She was admitted for further diagnostic testing and treatment.       4/26: Pt more comfortable, denies knee pain while lying in bed.  Swelling improved. No fever, chills, n, v.  Found to have medial meniscus tear.  4/27: Pt asleep but easily arousable.  Pt is comfortable and offers no complaints of knee pain at this time.    REVIEW OF SYSTEMS: All other review of systems is negative unless indicated above.    Vital Signs Last 24 Hrs  T(C): 36.7 (27 Apr 2019 05:43), Max: 36.7 (26 Apr 2019 22:18)  T(F): 98 (27 Apr 2019 05:43), Max: 98 (26 Apr 2019 22:18)  HR: 67 (27 Apr 2019 05:43) (67 - 77)  BP: 117/56 (27 Apr 2019 05:43) (117/56 - 122/49)  BP(mean): --  RR: 17 (27 Apr 2019 05:43) (17 - 18)  SpO2: 95% (27 Apr 2019 05:43) (95% - 95%)    PHYSICAL EXAM:    Constitutional: NAD, awake and alert, frail, elderly  HEENT: PERR, EOMI, Normal Hearing, MMM  Neck: Soft and supple  Respiratory: Breath sounds are clear bilaterally, No wheezing, rales or rhonchi  Cardiovascular: S1 and S2, regular rate and rhythm, no Murmurs, gallops or rubs  Gastrointestinal: Bowel Sounds present, soft, nontender, nondistended, no guarding, no rebound  Extremities: left knee swelling improved, non-tender to touch, able to bend knee  Neurological: A/O x 3, no focal deficits in my limited exam  Skin: No rashes    MEDICATIONS  (STANDING):  acetaminophen   Tablet .. 650 milliGRAM(s) Oral every 6 hours  aspirin  chewable 81 milliGRAM(s) Oral daily  cholecalciferol 5000 Unit(s) Oral daily  cyanocobalamin 1000 MICROGram(s) Oral daily  docusate sodium 100 milliGRAM(s) Oral three times a day  folic acid 3 milliGRAM(s) Oral daily  levothyroxine 25 MICROGram(s) Oral daily  predniSONE   Tablet 5 milliGRAM(s) Oral daily    MEDICATIONS  (PRN):  oxyCODONE    IR 5 milliGRAM(s) Oral every 6 hours PRN Severe Pain (7 - 10)  senna 2 Tablet(s) Oral at bedtime PRN Constipation                              12.2   8.76  )-----------( 218      ( 25 Apr 2019 12:58 )             38.8     04-25    142  |  109<H>  |  13  ----------------------------<  110<H>  3.7   |  29  |  0.64    Ca    8.5      25 Apr 2019 13:53    TPro  7.0  /  Alb  3.4  /  TBili  1.3<H>  /  DBili  x   /  AST  12<L>  /  ALT  19  /  AlkPhos  88  04-25    CAPILLARY BLOOD GLUCOSE        LIVER FUNCTIONS - ( 25 Apr 2019 13:53 )  Alb: 3.4 g/dL / Pro: 7.0 gm/dL / ALK PHOS: 88 U/L / ALT: 19 U/L / AST: 12 U/L / GGT: x           PT/INR - ( 25 Apr 2019 13:53 )   PT: 11.7 sec;   INR: 1.05 ratio         PTT - ( 25 Apr 2019 13:53 )  PTT:28.7 sec        Assessment and Plan:  89 year old woman with left knee pain    Left knee pain: Due to medial meniscus tear with knee effusion/synovitis  -pain improving  -doppler knee neg  -MRI as above  -pain management  -supportive care  -ortho eval appreciated, continue supportive care  -PT recommends [facility]    Hx back pain:  -supportive care    B/l non-displaced sacral ala fractures/S1-S2 endplates  Spine eval appreciated  MRI LS spine reviewed  Pain meds prn, lidoderm patch  Brace  Cont PT- likely [facility] on discharge    RA:  Con PO steroids  -methotrexate qTuesday    Hypothyroidism:  - cont synthroid    DVT ppx:  -SCDs    Code status:  -DNR/DNI    Dispo:  -d/c to [facility] once medically stable, probable Monday.

## 2019-04-28 RX ADMIN — Medication 25 MICROGRAM(S): at 07:05

## 2019-04-28 RX ADMIN — Medication 650 MILLIGRAM(S): at 17:44

## 2019-04-28 RX ADMIN — PREGABALIN 1000 MICROGRAM(S): 225 CAPSULE ORAL at 11:21

## 2019-04-28 RX ADMIN — Medication 81 MILLIGRAM(S): at 11:21

## 2019-04-28 RX ADMIN — Medication 5000 UNIT(S): at 11:21

## 2019-04-28 RX ADMIN — Medication 650 MILLIGRAM(S): at 11:20

## 2019-04-28 RX ADMIN — Medication 100 MILLIGRAM(S): at 21:40

## 2019-04-28 RX ADMIN — Medication 100 MILLIGRAM(S): at 13:13

## 2019-04-28 RX ADMIN — Medication 650 MILLIGRAM(S): at 11:41

## 2019-04-28 RX ADMIN — Medication 5 MILLIGRAM(S): at 07:05

## 2019-04-28 RX ADMIN — Medication 100 MILLIGRAM(S): at 07:06

## 2019-04-28 RX ADMIN — Medication 650 MILLIGRAM(S): at 17:49

## 2019-04-28 RX ADMIN — Medication 3 MILLIGRAM(S): at 11:21

## 2019-04-28 NOTE — PROGRESS NOTE ADULT - SUBJECTIVE AND OBJECTIVE BOX
CC: Left knee pain History of Present Illness: 	  Pt is a 89 yrs old women with h/o RA, and other comorbidities who presents with chief complaint of left knee pain.  Per pt on Tuesday she went to reach for her bone and thinks she may have done something to her knee.  Since yesterday she has been having excruciating knee pain, not improving so she decided to come to ED for further evaluation.  In ED: Pt unable to ambulate.  She was admitted for further diagnostic testing and treatment.       4/26: Pt more comfortable, denies knee pain while lying in bed.  Swelling improved. No fever, chills, n, v.  Found to have medial meniscus tear.  4/27: Pt asleep but easily arousable.  Pt is comfortable and offers no complaints of knee pain at this time.  4/28: Pt sitting in chair, knee pain continues to improve.  Pt feeling well, no fever, chills, n, v, or knee discomfort.    REVIEW OF SYSTEMS: All other review of systems is negative unless indicated above.    Vital Signs Last 24 Hrs  T(C): 36.2 (28 Apr 2019 11:31), Max: 36.9 (27 Apr 2019 12:20)  T(F): 97.1 (28 Apr 2019 11:31), Max: 98.5 (27 Apr 2019 12:20)  HR: 69 (28 Apr 2019 11:31) (63 - 69)  BP: 126/75 (28 Apr 2019 11:31) (126/75 - 139/72)  BP(mean): --  RR: 17 (28 Apr 2019 11:31) (17 - 18)  SpO2: 98% (28 Apr 2019 11:31) (97% - 100%)    PHYSICAL EXAM:    Constitutional: NAD, awake and alert, frail, elderly  HEENT: PERR, EOMI, Normal Hearing, MMM  Neck: Soft and supple  Respiratory: Breath sounds are clear bilaterally, No wheezing, rales or rhonchi  Cardiovascular: S1 and S2, regular rate and rhythm, no Murmurs, gallops or rubs  Gastrointestinal: Bowel Sounds present, soft, nontender, nondistended, no guarding, no rebound  Extremities: left knee swelling improved, non-tender to touch, able to bend knee  Neurological: A/O x 3, no focal deficits in my limited exam  Skin: No rashes    MEDICATIONS  (STANDING):  acetaminophen   Tablet .. 650 milliGRAM(s) Oral every 6 hours  aspirin  chewable 81 milliGRAM(s) Oral daily  cholecalciferol 5000 Unit(s) Oral daily  cyanocobalamin 1000 MICROGram(s) Oral daily  docusate sodium 100 milliGRAM(s) Oral three times a day  folic acid 3 milliGRAM(s) Oral daily  levothyroxine 25 MICROGram(s) Oral daily  predniSONE   Tablet 5 milliGRAM(s) Oral daily    MEDICATIONS  (PRN):  senna 2 Tablet(s) Oral at bedtime PRN Constipation      Assessment and Plan:  89 year old woman with left knee pain    Left knee pain: Due to medial meniscus tear with knee effusion/synovitis  -pain improving  -doppler knee neg  -MRI as above  -pain management, stop opioids.  continue standing tylenol.  -supportive care  -ortho eval appreciated, continue supportive care  -PT recommends [facility]    Hx back pain:  -supportive care    B/l non-displaced sacral ala fractures/S1-S2 endplates  Spine eval appreciated  MRI LS spine reviewed  Pain meds prn, lidoderm patch  Brace  Cont PT- likely [facility] on discharge    RA:  Con PO steroids  -methotrexate qTuesday    Hypothyroidism:  - cont synthroid    DVT ppx:  -SCDs    Code status:  -DNR/DNI    Dispo:  -d/c to [facility] tomorrow

## 2019-04-29 ENCOUNTER — TRANSCRIPTION ENCOUNTER (OUTPATIENT)
Age: 84
End: 2019-04-29

## 2019-04-29 RX ORDER — ACETAMINOPHEN 500 MG
2 TABLET ORAL
Qty: 0 | Refills: 0 | DISCHARGE
Start: 2019-04-29

## 2019-04-29 RX ADMIN — Medication 100 MILLIGRAM(S): at 14:18

## 2019-04-29 RX ADMIN — Medication 3 MILLIGRAM(S): at 11:52

## 2019-04-29 RX ADMIN — Medication 650 MILLIGRAM(S): at 17:23

## 2019-04-29 RX ADMIN — Medication 5 MILLIGRAM(S): at 05:08

## 2019-04-29 RX ADMIN — Medication 81 MILLIGRAM(S): at 11:52

## 2019-04-29 RX ADMIN — Medication 25 MICROGRAM(S): at 05:08

## 2019-04-29 RX ADMIN — Medication 5000 UNIT(S): at 11:52

## 2019-04-29 RX ADMIN — Medication 100 MILLIGRAM(S): at 05:08

## 2019-04-29 RX ADMIN — Medication 100 MILLIGRAM(S): at 22:15

## 2019-04-29 RX ADMIN — Medication 650 MILLIGRAM(S): at 05:08

## 2019-04-29 RX ADMIN — Medication 650 MILLIGRAM(S): at 17:53

## 2019-04-29 RX ADMIN — PREGABALIN 1000 MICROGRAM(S): 225 CAPSULE ORAL at 11:52

## 2019-04-29 NOTE — DISCHARGE NOTE PROVIDER - HOSPITAL COURSE
CC: Left knee pain    History of Present Illness:     Pt is a 89 yrs old women with h/o RA, and other comorbidities who presents with chief complaint of left knee pain.  Per pt on Tuesday she went to reach for her bone and thinks she may have done something to her knee.  Since yesterday she has been having excruciating knee pain, not improving so she decided to come to ED for further evaluation.  In ED: Pt unable to ambulate.  She was admitted for further diagnostic testing and treatment.             4/26: Pt more comfortable, denies knee pain while lying in bed.  Swelling improved. No fever, chills, n, v.  Found to have medial meniscus tear.    4/27: Pt asleep but easily arousable.  Pt is comfortable and offers no complaints of knee pain at this time.    4/28: Pt sitting in chair, knee pain continues to improve.  Pt feeling well, no fever, chills, n, v, or knee discomfort.    4/29: doing well, lying in bed. Awaiting d/c to [facility].  Denies fever, chills, N, V, abd pain, CP, SOB.        REVIEW OF SYSTEMS: All other review of systems is negative unless indicated above.        Vital Signs Last 24 Hrs    T(C): 36.4 (29 Apr 2019 11:38), Max: 36.6 (28 Apr 2019 21:51)    T(F): 97.6 (29 Apr 2019 11:38), Max: 97.8 (28 Apr 2019 21:51)    HR: 57 (29 Apr 2019 11:38) (57 - 67)    BP: 142/67 (29 Apr 2019 11:38) (124/54 - 150/55)    BP(mean): --    RR: 16 (29 Apr 2019 11:38) (16 - 16)    SpO2: 98% (29 Apr 2019 11:38) (98% - 98%)            PHYSICAL EXAM:        Constitutional: NAD, awake and alert, frail, elderly    HEENT: PERR, EOMI, Normal Hearing, MMM    Neck: Soft and supple    Respiratory: Breath sounds are clear bilaterally, No wheezing, rales or rhonchi    Cardiovascular: S1 and S2, regular rate and rhythm, no Murmurs, gallops or rubs    Gastrointestinal: Bowel Sounds present, soft, nontender, nondistended, no guarding, no rebound    Extremities: left knee swelling improved, non-tender to touch, able to bend knee    Neurological: A/O x 3, no focal deficits in my limited exam    Skin: No rashes        meds/labs: Reviewed             Assessment and Plan:  89 year old woman with left knee pain        Left knee pain: Due to medial meniscus tear with knee effusion/synovitis    -pain improving    -doppler knee neg    -MRI as above    -pain management, stop opioids.  continue standing tylenol.    -supportive care    -ortho eval appreciated, continue supportive care    -PT recommends [facility]        Hx back pain:    -supportive care        B/l non-displaced sacral ala fractures/S1-S2 endplates    Spine eval appreciated    MRI LS spine reviewed    Pain meds prn, lidoderm patch    Cont PT        RA:    Con PO steroids    -methotrexate qTuesday        Hypothyroidism:    - cont synthroid        DVT ppx:    -SCDs        Code status:    -DNR/DNI        Dispo:    -d/c to [facility]

## 2019-04-29 NOTE — DISCHARGE NOTE PROVIDER - NSDCCPCAREPLAN_GEN_ALL_CORE_FT
PRINCIPAL DISCHARGE DIAGNOSIS  Diagnosis: Acute pain of left knee  Assessment and Plan of Treatment: due to meniscus tear.  Treat with symptomatic care and PT with outpatient pcp and ortho re-eval.

## 2019-04-30 ENCOUNTER — TRANSCRIPTION ENCOUNTER (OUTPATIENT)
Age: 84
End: 2019-04-30

## 2019-04-30 VITALS
HEART RATE: 58 BPM | SYSTOLIC BLOOD PRESSURE: 144 MMHG | DIASTOLIC BLOOD PRESSURE: 54 MMHG | TEMPERATURE: 98 F | RESPIRATION RATE: 16 BRPM | OXYGEN SATURATION: 96 %

## 2019-04-30 RX ORDER — METHOTREXATE 2.5 MG/1
20 TABLET ORAL ONCE
Qty: 0 | Refills: 0 | Status: COMPLETED | OUTPATIENT
Start: 2019-04-30 | End: 2019-04-30

## 2019-04-30 RX ORDER — METHOTREXATE 2.5 MG/1
8 TABLET ORAL DAILY
Qty: 0 | Refills: 0 | Status: DISCONTINUED | OUTPATIENT
Start: 2019-04-30 | End: 2019-04-30

## 2019-04-30 RX ADMIN — Medication 650 MILLIGRAM(S): at 11:39

## 2019-04-30 RX ADMIN — Medication 5 MILLIGRAM(S): at 05:19

## 2019-04-30 RX ADMIN — Medication 25 MICROGRAM(S): at 05:19

## 2019-04-30 RX ADMIN — Medication 5000 UNIT(S): at 11:38

## 2019-04-30 RX ADMIN — Medication 100 MILLIGRAM(S): at 05:19

## 2019-04-30 RX ADMIN — Medication 650 MILLIGRAM(S): at 01:07

## 2019-04-30 RX ADMIN — Medication 81 MILLIGRAM(S): at 11:38

## 2019-04-30 RX ADMIN — PREGABALIN 1000 MICROGRAM(S): 225 CAPSULE ORAL at 11:38

## 2019-04-30 RX ADMIN — Medication 3 MILLIGRAM(S): at 11:38

## 2019-04-30 RX ADMIN — Medication 100 MILLIGRAM(S): at 13:08

## 2019-04-30 RX ADMIN — Medication 650 MILLIGRAM(S): at 12:30

## 2019-04-30 RX ADMIN — Medication 650 MILLIGRAM(S): at 00:37

## 2019-04-30 NOTE — DISCHARGE NOTE NURSING/CASE MANAGEMENT/SOCIAL WORK - NSDCDPATPORTLINK_GEN_ALL_CORE
You can access the [facility] Patient Portal, offered by [facility], by registering with the following website: [email or web address]

## 2019-04-30 NOTE — PROGRESS NOTE ADULT - SUBJECTIVE AND OBJECTIVE BOX
CC: Left knee pain History of Present Illness: 	  Pt is a 89 yrs old women with h/o RA, and other comorbidities who presents with chief complaint of left knee pain.  Per pt on Tuesday she went to reach for her bone and thinks she may have done something to her knee.  Since yesterday she has been having excruciating knee pain, not improving so she decided to come to ED for further evaluation.  In ED: Pt unable to ambulate.  She was admitted for further diagnostic testing and treatment.       4/26: Pt more comfortable, denies knee pain while lying in bed.  Swelling improved. No fever, chills, n, v.  Found to have medial meniscus tear.  4/27: Pt asleep but easily arousable.  Pt is comfortable and offers no complaints of knee pain at this time.  4/28: Pt sitting in chair, knee pain continues to improve.  Pt feeling well, no fever, chills, n, v, or knee discomfort.  4/29: doing well, lying in bed. Awaiting d/c to [facility].  Denies fever, chills, N, V, abd pain, CP, SOB.  4/30: Doing well, [facility] Denies fever, chills, N, V, abd pain, CP, SOB.    REVIEW OF SYSTEMS: All other review of systems is negative unless indicated above.    Vital Signs Last 24 Hrs  T(C): 36.7 (30 Apr 2019 11:20), Max: 36.7 (29 Apr 2019 19:57)  T(F): 98.1 (30 Apr 2019 11:20), Max: 98.1 (29 Apr 2019 19:57)  HR: 58 (30 Apr 2019 11:20) (57 - 75)  BP: 144/54 (30 Apr 2019 11:20) (142/67 - 152/56)  BP(mean): --  RR: 16 (30 Apr 2019 11:20) (16 - 17)  SpO2: 96% (30 Apr 2019 11:20) (96% - 100%)      PHYSICAL EXAM:    Constitutional: NAD, awake and alert, frail, elderly  HEENT: PERR, EOMI, Normal Hearing, MMM  Neck: Soft and supple  Respiratory: Breath sounds are clear bilaterally, No wheezing, rales or rhonchi  Cardiovascular: S1 and S2, regular rate and rhythm, no Murmurs, gallops or rubs  Gastrointestinal: Bowel Sounds present, soft, nontender, nondistended, no guarding, no rebound  Extremities: left knee swelling improved, non-tender to touch, able to bend knee  Neurological: A/O x 3, no focal deficits in my limited exam  Skin: No rashes    meds/labs: Reviewed       Assessment and Plan:  89 year old woman with left knee pain    Left knee pain: Due to medial meniscus tear with knee effusion/synovitis  -pain improving  -doppler knee neg  -MRI as above  -pain management, stop opioids.  continue standing tylenol.  -supportive care  -ortho eval appreciated, continue supportive care  -PT recommends [facility]    Hx back pain:  -supportive care    B/l non-displaced sacral ala fractures/S1-S2 endplates  Spine eval appreciated  MRI LS spine reviewed  Pain meds prn, lidoderm patch  Cont PT    RA:  Con PO steroids  -methotrexate qTuesday    Hypothyroidism:  - cont synthroid    DVT ppx:  -SCDs    Code status:  -DNR/DNI    Dispo:  -d/c to [facility]

## 2019-05-07 DIAGNOSIS — Z85.3 PERSONAL HISTORY OF MALIGNANT NEOPLASM OF BREAST: ICD-10-CM

## 2019-05-07 DIAGNOSIS — S32.19XA OTHER FRACTURE OF SACRUM, INITIAL ENCOUNTER FOR CLOSED FRACTURE: ICD-10-CM

## 2019-05-07 DIAGNOSIS — E03.9 HYPOTHYROIDISM, UNSPECIFIED: ICD-10-CM

## 2019-05-07 DIAGNOSIS — Z66 DO NOT RESUSCITATE: ICD-10-CM

## 2019-05-07 DIAGNOSIS — I11.0 HYPERTENSIVE HEART DISEASE WITH HEART FAILURE: ICD-10-CM

## 2019-05-07 DIAGNOSIS — M25.462 EFFUSION, LEFT KNEE: ICD-10-CM

## 2019-05-07 DIAGNOSIS — I50.9 HEART FAILURE, UNSPECIFIED: ICD-10-CM

## 2019-05-07 DIAGNOSIS — M06.9 RHEUMATOID ARTHRITIS, UNSPECIFIED: ICD-10-CM

## 2019-05-07 DIAGNOSIS — Z79.82 LONG TERM (CURRENT) USE OF ASPIRIN: ICD-10-CM
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
## 2019-05-07 DIAGNOSIS — M25.562 PAIN IN LEFT KNEE: ICD-10-CM

## 2019-05-07 DIAGNOSIS — S83.242A OTHER TEAR OF MEDIAL MENISCUS, CURRENT INJURY, LEFT KNEE, INITIAL ENCOUNTER: ICD-10-CM

## 2019-05-07 DIAGNOSIS — M65.9 SYNOVITIS AND TENOSYNOVITIS, UNSPECIFIED: ICD-10-CM
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
## 2019-05-07 DIAGNOSIS — Z87.891 PERSONAL HISTORY OF NICOTINE DEPENDENCE: ICD-10-CM

## 2019-05-31 NOTE — ASU PREOP CHECKLIST - NSWEIGHTCALCTOOLDRUG_GEN_A_CORE
Kian Brewer is a 15year old female who was brought in for this visit. History was provided by the Mom. HPI:   Patient presents with:   Well Child      Patient was scheduled for a Well Visit for high school but had [address] 12/18   Mom was offered HPV vac new symptoms, or if parent concerned. Reviewed return precautions. Results From Past 48 Hours:  No results found for this or any previous visit (from the past 48 hour(s)).     Orders Placed This Visit:  No orders of the defined types were placed in this  used

## 2019-06-28 NOTE — PROGRESS NOTE ADULT - ASSESSMENT
Assessment/Plan    *RT-sided sciatica  MRI LS spine with sacral ala and S1/2 acute fractures  LSO  Pain meds prn  Cont PT  Lidoderm patch    *RA  Con PO steroids    *Hypothyroidism- cont synthroid    *H/o ICH- daughter refused SQ Heparin detailed exam ear L/ear R details…

## 2019-09-25 ENCOUNTER — APPOINTMENT (OUTPATIENT)
Dept: DERMATOLOGY | Facility: CLINIC | Age: 84
End: 2019-09-25
Payer: MEDICARE

## 2019-09-25 VITALS — BODY MASS INDEX: 28.66 KG/M2 | HEIGHT: 60 IN | WEIGHT: 146 LBS

## 2019-09-25 DIAGNOSIS — L57.0 ACTINIC KERATOSIS: ICD-10-CM

## 2019-09-25 DIAGNOSIS — Z87.39 PERSONAL HISTORY OF OTHER DISEASES OF THE MUSCULOSKELETAL SYSTEM AND CONNECTIVE TISSUE: ICD-10-CM

## 2019-09-25 DIAGNOSIS — L21.9 SEBORRHEIC DERMATITIS, UNSPECIFIED: ICD-10-CM

## 2019-09-25 PROCEDURE — 99203 OFFICE O/P NEW LOW 30 MIN: CPT

## 2019-09-25 RX ORDER — HYDROCORTISONE 25 MG/G
2.5 CREAM TOPICAL TWICE DAILY
Qty: 1 | Refills: 3 | Status: ACTIVE | COMMUNITY
Start: 2019-09-25 | End: 1900-01-01

## 2019-09-25 RX ORDER — METHOTREXATE SODIUM/PF 20 MG
20 VIAL (EA) INJECTION
Refills: 0 | Status: ACTIVE | COMMUNITY

## 2019-09-25 RX ORDER — FOLIC ACID 0.4 MG
200 TABLET ORAL
Refills: 0 | Status: ACTIVE | COMMUNITY

## 2019-09-25 RX ORDER — HYDROCORTISONE BUTYRATE 1 MG/ML
0.1 SOLUTION TOPICAL
Qty: 1 | Refills: 2 | Status: ACTIVE | COMMUNITY
Start: 2019-09-25 | End: 1900-01-01

## 2019-09-25 RX ORDER — PREDNISONE 10 MG
TABLET ORAL
Refills: 0 | Status: ACTIVE | COMMUNITY

## 2019-09-25 NOTE — PHYSICAL EXAM
[Alert] : alert [Well Nourished] : well nourished [Oriented x 3] : ~L oriented x 3 [Declined] : declined [Ears] : Ears [Eyelids] : Eyelids [Lips] : Lips [Neck] : Neck [FreeTextEntry3] : erythema, scale - scalp, eyebrows, ears.  Moderate of the bilateral malar region.\par \par Keratotic papule of the left ear rim.

## 2019-09-25 NOTE — ASSESSMENT
[FreeTextEntry1] : Seb derm\par Education.\par DHS-Zinc shampoo.\par Locoid solution bid prn for scalp.\par Westcort cream bid prn for eyebrows, ears, and sparingly prn only for face.\par f/u in 2 months.

## 2019-09-25 NOTE — HISTORY OF PRESENT ILLNESS
[FreeTextEntry1] : Pruritus. [de-identified] : Face, and scalp.  Notes some flaking.  No self tx.  Recent onset over the past month or two.  No prior hx of conditions.

## 2020-02-19 ENCOUNTER — INPATIENT (INPATIENT)
Facility: HOSPITAL | Age: 85
LOS: 0 days | Discharge: HOME | DRG: 315 | End: 2020-02-20
Attending: INTERNAL MEDICINE | Admitting: INTERNAL MEDICINE
Payer: MEDICARE

## 2020-02-19 VITALS
WEIGHT: 149.91 LBS | DIASTOLIC BLOOD PRESSURE: 87 MMHG | RESPIRATION RATE: 18 BRPM | HEART RATE: 85 BPM | HEIGHT: 60 IN | TEMPERATURE: 99 F | SYSTOLIC BLOOD PRESSURE: 142 MMHG | OXYGEN SATURATION: 100 %

## 2020-02-19 DIAGNOSIS — Z98.890 OTHER SPECIFIED POSTPROCEDURAL STATES: Chronic | ICD-10-CM

## 2020-02-19 DIAGNOSIS — Z96.643 PRESENCE OF ARTIFICIAL HIP JOINT, BILATERAL: Chronic | ICD-10-CM

## 2020-02-19 DIAGNOSIS — Z98.49 CATARACT EXTRACTION STATUS, UNSPECIFIED EYE: Chronic | ICD-10-CM

## 2020-02-19 DIAGNOSIS — Z90.49 ACQUIRED ABSENCE OF OTHER SPECIFIED PARTS OF DIGESTIVE TRACT: Chronic | ICD-10-CM

## 2020-02-19 DIAGNOSIS — R06.00 DYSPNEA, UNSPECIFIED: ICD-10-CM

## 2020-02-19 LAB
APPEARANCE UR: CLEAR — SIGNIFICANT CHANGE UP
BASOPHILS # BLD AUTO: 0.02 K/UL — SIGNIFICANT CHANGE UP (ref 0–0.2)
BASOPHILS NFR BLD AUTO: 0.3 % — SIGNIFICANT CHANGE UP (ref 0–2)
BILIRUB UR-MCNC: NEGATIVE — SIGNIFICANT CHANGE UP
COLOR SPEC: YELLOW — SIGNIFICANT CHANGE UP
DIFF PNL FLD: ABNORMAL
EOSINOPHIL # BLD AUTO: 0.06 K/UL — SIGNIFICANT CHANGE UP (ref 0–0.5)
EOSINOPHIL NFR BLD AUTO: 0.9 % — SIGNIFICANT CHANGE UP (ref 0–6)
GLUCOSE UR QL: NEGATIVE MG/DL — SIGNIFICANT CHANGE UP
HCT VFR BLD CALC: 40.9 % — SIGNIFICANT CHANGE UP (ref 34.5–45)
HGB BLD-MCNC: 13.1 G/DL — SIGNIFICANT CHANGE UP (ref 11.5–15.5)
IMM GRANULOCYTES NFR BLD AUTO: 0.6 % — SIGNIFICANT CHANGE UP (ref 0–1.5)
KETONES UR-MCNC: NEGATIVE — SIGNIFICANT CHANGE UP
LEUKOCYTE ESTERASE UR-ACNC: ABNORMAL
LYMPHOCYTES # BLD AUTO: 1.36 K/UL — SIGNIFICANT CHANGE UP (ref 1–3.3)
LYMPHOCYTES # BLD AUTO: 19.5 % — SIGNIFICANT CHANGE UP (ref 13–44)
MCHC RBC-ENTMCNC: 32 GM/DL — SIGNIFICANT CHANGE UP (ref 32–36)
MCHC RBC-ENTMCNC: 33.1 PG — SIGNIFICANT CHANGE UP (ref 27–34)
MCV RBC AUTO: 103.3 FL — HIGH (ref 80–100)
MONOCYTES # BLD AUTO: 0.37 K/UL — SIGNIFICANT CHANGE UP (ref 0–0.9)
MONOCYTES NFR BLD AUTO: 5.3 % — SIGNIFICANT CHANGE UP (ref 2–14)
NEUTROPHILS # BLD AUTO: 5.14 K/UL — SIGNIFICANT CHANGE UP (ref 1.8–7.4)
NEUTROPHILS NFR BLD AUTO: 73.4 % — SIGNIFICANT CHANGE UP (ref 43–77)
NITRITE UR-MCNC: NEGATIVE — SIGNIFICANT CHANGE UP
PH UR: 7 — SIGNIFICANT CHANGE UP (ref 5–8)
PLATELET # BLD AUTO: 289 K/UL — SIGNIFICANT CHANGE UP (ref 150–400)
PROT UR-MCNC: NEGATIVE MG/DL — SIGNIFICANT CHANGE UP
RBC # BLD: 3.96 M/UL — SIGNIFICANT CHANGE UP (ref 3.8–5.2)
RBC # FLD: 16.1 % — HIGH (ref 10.3–14.5)
SP GR SPEC: 1 — LOW (ref 1.01–1.02)
UROBILINOGEN FLD QL: NEGATIVE MG/DL — SIGNIFICANT CHANGE UP
WBC # BLD: 6.99 K/UL — SIGNIFICANT CHANGE UP (ref 3.8–10.5)
WBC # FLD AUTO: 6.99 K/UL — SIGNIFICANT CHANGE UP (ref 3.8–10.5)

## 2020-02-19 PROCEDURE — 84100 ASSAY OF PHOSPHORUS: CPT

## 2020-02-19 PROCEDURE — 82746 ASSAY OF FOLIC ACID SERUM: CPT

## 2020-02-19 PROCEDURE — 82607 VITAMIN B-12: CPT

## 2020-02-19 PROCEDURE — 80061 LIPID PANEL: CPT

## 2020-02-19 PROCEDURE — 93306 TTE W/DOPPLER COMPLETE: CPT

## 2020-02-19 PROCEDURE — 85027 COMPLETE CBC AUTOMATED: CPT

## 2020-02-19 PROCEDURE — 93010 ELECTROCARDIOGRAM REPORT: CPT

## 2020-02-19 PROCEDURE — 80053 COMPREHEN METABOLIC PANEL: CPT

## 2020-02-19 PROCEDURE — 84484 ASSAY OF TROPONIN QUANT: CPT

## 2020-02-19 PROCEDURE — 36415 COLL VENOUS BLD VENIPUNCTURE: CPT

## 2020-02-19 PROCEDURE — 71275 CT ANGIOGRAPHY CHEST: CPT

## 2020-02-19 PROCEDURE — 71045 X-RAY EXAM CHEST 1 VIEW: CPT | Mod: 26

## 2020-02-19 PROCEDURE — 83735 ASSAY OF MAGNESIUM: CPT

## 2020-02-19 RX ORDER — SODIUM CHLORIDE 9 MG/ML
3 INJECTION INTRAMUSCULAR; INTRAVENOUS; SUBCUTANEOUS ONCE
Refills: 0 | Status: COMPLETED | OUTPATIENT
Start: 2020-02-19 | End: 2020-02-19

## 2020-02-19 RX ADMIN — SODIUM CHLORIDE 3 MILLILITER(S): 9 INJECTION INTRAMUSCULAR; INTRAVENOUS; SUBCUTANEOUS at 20:40

## 2020-02-19 NOTE — ED PROVIDER NOTE - CLINICAL SUMMARY MEDICAL DECISION MAKING FREE TEXT BOX
Pt with SOB on exertion for 2 months with episode while walking in kitchen today, afib, plan for labs, CXR, probable admission.

## 2020-02-19 NOTE — ED PROVIDER NOTE - OBJECTIVE STATEMENT
89 y/o f with PMHx of anemia, BL hearing loss, SVT, breast CA s/p breat lumpectomy, HTN, RA, hypothyroid, ICH, afib, CHF, s/p appendectomy presenting to the ED c/o episode of VILLALBA this afternoon. Pt states she switched from metoprolol to Coreg today and was newly placed on Lasix for onset of CHF. Pt states she was in the kitchen eating when she got up to use the bathroom when she felt SOB and sat down in a chair. SOB resolved after sitting down. Reports intermittent, similar episodes of VILLALBA while walking for past 2 months. Denies fever, chills, cough, chest pain, palpitations, HA, dizziness, or weakness. 89 y/o f with PMHx of anemia, BL hearing loss, SVT, breast CA s/p breat lumpectomy, HTN, RA, hypothyroid, ICH, afib on ASA, CHF, s/p appendectomy presenting to the ED c/o episode of VILLALBA this afternoon. Pt saw Dr. Sepulveda today who switched pt from metoprolol to Coreg and newly placed on Lasix 20mg. Pt was in the kitchen eating when she got up to use the bathroom, felt SOB and had to sit back down. SOB resolved after sitting down. Reports intermittent, similar episodes of VILLALBA while walking for past 2 months. Denies fever, chills, cough, chest pain, palpitations, HA, dizziness, or weakness. PCP: Dr. Greenfield. Electrophysiologist: Dr. Sepulveda. Cardiologist: Dr. Palmer. Former smoker, hasn't smoked since 1972.

## 2020-02-19 NOTE — ED ADULT NURSE NOTE - NSIMPLEMENTINTERV_GEN_ALL_ED
Implemented All Fall Risk Interventions:  Pillow to call system. Call bell, personal items and telephone within reach. Instruct patient to call for assistance. Room bathroom lighting operational. Non-slip footwear when patient is off stretcher. Physically safe environment: no spills, clutter or unnecessary equipment. Stretcher in lowest position, wheels locked, appropriate side rails in place. Provide visual cue, wrist band, yellow gown, etc. Monitor gait and stability. Monitor for mental status changes and reorient to person, place, and time. Review medications for side effects contributing to fall risk. Reinforce activity limits and safety measures with patient and family.

## 2020-02-19 NOTE — ED PROVIDER NOTE - MUSCULOSKELETAL, MLM
Spine appears normal, range of motion is not limited, no muscle or joint tenderness Spine appears normal, range of motion is not limited, no muscle or joint tenderness. +trace edema in BL LE

## 2020-02-19 NOTE — ED ADULT NURSE NOTE - CHPI ED NUR SYMPTOMS NEG
no edema/no diaphoresis/no fever/no cough/no chest pain/no chills/no wheezing/no body aches/no headache/no hemoptysis

## 2020-02-19 NOTE — ED ADULT TRIAGE NOTE - CHIEF COMPLAINT QUOTE
Patient comes to ED for new onset SOB today after taking 2 new pills, coreg and lasix. Patient denies any hives or rash. Pt O2 sat 100%. Patient denies any chest pain at this time

## 2020-02-19 NOTE — ED PROVIDER NOTE - PMH
Adhesive capsulitis of left shoulder    Afib    Bilateral hearing loss, unspecified hearing loss type    Breast cancer  left  CHF (congestive heart failure)    Dry eyes, bilateral    Goiter    History of anemia  s/p hip replacements  History of anemia    HTN (hypertension)    Hypothyroid    ICH (intracerebral hemorrhage)  2014: Followed by Dr. Cesar Leigh of colon  Followed by Dr. Bang  Melanoma of eye, right  Followed in [facility] by Opthamologist  Rheumatoid arthritis    SVT (supraventricular tachycardia)    Varicose veins

## 2020-02-19 NOTE — ED ADULT NURSE NOTE - OBJECTIVE STATEMENT
pt is a 89 y/o female presenting to ED for eval of exertional dyspnea this afternoon. pt states she was changed from metoprolol to coreg today and was newly placed on lasix for onset of CHF. pt states she was getting up to use the bathroom and felt too dyspneic to continue walking and thus sat down in a chair. pt denies fever chills cough chest pain palpitations or weakness.

## 2020-02-20 ENCOUNTER — TRANSCRIPTION ENCOUNTER (OUTPATIENT)
Age: 85
End: 2020-02-20

## 2020-02-20 VITALS
TEMPERATURE: 98 F | HEART RATE: 57 BPM | SYSTOLIC BLOOD PRESSURE: 130 MMHG | OXYGEN SATURATION: 98 % | DIASTOLIC BLOOD PRESSURE: 45 MMHG | RESPIRATION RATE: 17 BRPM

## 2020-02-20 DIAGNOSIS — I50.30 UNSPECIFIED DIASTOLIC (CONGESTIVE) HEART FAILURE: ICD-10-CM

## 2020-02-20 DIAGNOSIS — E05.90 THYROTOXICOSIS, UNSPECIFIED WITHOUT THYROTOXIC CRISIS OR STORM: ICD-10-CM

## 2020-02-20 DIAGNOSIS — I10 ESSENTIAL (PRIMARY) HYPERTENSION: ICD-10-CM

## 2020-02-20 DIAGNOSIS — I50.9 HEART FAILURE, UNSPECIFIED: ICD-10-CM

## 2020-02-20 DIAGNOSIS — I48.91 UNSPECIFIED ATRIAL FIBRILLATION: ICD-10-CM

## 2020-02-20 LAB
ADD ON TEST-SPECIMEN IN LAB: SIGNIFICANT CHANGE UP
ADD ON TEST-SPECIMEN IN LAB: SIGNIFICANT CHANGE UP
ALBUMIN SERPL ELPH-MCNC: 3 G/DL — LOW (ref 3.3–5)
ALP SERPL-CCNC: 53 U/L — SIGNIFICANT CHANGE UP (ref 40–120)
ALT FLD-CCNC: 11 U/L — LOW (ref 12–78)
ANION GAP SERPL CALC-SCNC: 6 MMOL/L — SIGNIFICANT CHANGE UP (ref 5–17)
AST SERPL-CCNC: 14 U/L — LOW (ref 15–37)
BILIRUB SERPL-MCNC: 1.3 MG/DL — HIGH (ref 0.2–1.2)
BUN SERPL-MCNC: 12 MG/DL — SIGNIFICANT CHANGE UP (ref 7–23)
CALCIUM SERPL-MCNC: 8.3 MG/DL — LOW (ref 8.5–10.1)
CHLORIDE SERPL-SCNC: 111 MMOL/L — HIGH (ref 96–108)
CHOLEST SERPL-MCNC: 203 MG/DL — HIGH (ref 10–199)
CO2 SERPL-SCNC: 26 MMOL/L — SIGNIFICANT CHANGE UP (ref 22–31)
CREAT SERPL-MCNC: 0.61 MG/DL — SIGNIFICANT CHANGE UP (ref 0.5–1.3)
GLUCOSE SERPL-MCNC: 88 MG/DL — SIGNIFICANT CHANGE UP (ref 70–99)
HCT VFR BLD CALC: 35.3 % — SIGNIFICANT CHANGE UP (ref 34.5–45)
HDLC SERPL-MCNC: 80 MG/DL — SIGNIFICANT CHANGE UP
HGB BLD-MCNC: 11.5 G/DL — SIGNIFICANT CHANGE UP (ref 11.5–15.5)
LIPID PNL WITH DIRECT LDL SERPL: 108 MG/DL — HIGH
MAGNESIUM SERPL-MCNC: 2.3 MG/DL — SIGNIFICANT CHANGE UP (ref 1.6–2.6)
MCHC RBC-ENTMCNC: 32.6 GM/DL — SIGNIFICANT CHANGE UP (ref 32–36)
MCHC RBC-ENTMCNC: 33.5 PG — SIGNIFICANT CHANGE UP (ref 27–34)
MCV RBC AUTO: 102.9 FL — HIGH (ref 80–100)
PHOSPHATE SERPL-MCNC: 3.2 MG/DL — SIGNIFICANT CHANGE UP (ref 2.5–4.5)
PLATELET # BLD AUTO: 261 K/UL — SIGNIFICANT CHANGE UP (ref 150–400)
POTASSIUM SERPL-MCNC: 3.7 MMOL/L — SIGNIFICANT CHANGE UP (ref 3.5–5.3)
POTASSIUM SERPL-SCNC: 3.7 MMOL/L — SIGNIFICANT CHANGE UP (ref 3.5–5.3)
PROT SERPL-MCNC: 5.9 GM/DL — LOW (ref 6–8.3)
RBC # BLD: 3.43 M/UL — LOW (ref 3.8–5.2)
RBC # FLD: 15.9 % — HIGH (ref 10.3–14.5)
SODIUM SERPL-SCNC: 143 MMOL/L — SIGNIFICANT CHANGE UP (ref 135–145)
TOTAL CHOLESTEROL/HDL RATIO MEASUREMENT: 2.5 RATIO — LOW (ref 3.3–7.1)
TRIGL SERPL-MCNC: 78 MG/DL — SIGNIFICANT CHANGE UP (ref 10–149)
WBC # BLD: 6.12 K/UL — SIGNIFICANT CHANGE UP (ref 3.8–10.5)
WBC # FLD AUTO: 6.12 K/UL — SIGNIFICANT CHANGE UP (ref 3.8–10.5)

## 2020-02-20 PROCEDURE — 99222 1ST HOSP IP/OBS MODERATE 55: CPT | Mod: GC

## 2020-02-20 PROCEDURE — 71270 CT THORAX DX C-/C+: CPT | Mod: 26

## 2020-02-20 PROCEDURE — 93306 TTE W/DOPPLER COMPLETE: CPT | Mod: 26

## 2020-02-20 PROCEDURE — 99222 1ST HOSP IP/OBS MODERATE 55: CPT

## 2020-02-20 RX ORDER — CARVEDILOL PHOSPHATE 80 MG/1
3.12 CAPSULE, EXTENDED RELEASE ORAL EVERY 12 HOURS
Refills: 0 | Status: DISCONTINUED | OUTPATIENT
Start: 2020-02-20 | End: 2020-02-20

## 2020-02-20 RX ORDER — LIDOCAINE 4 G/100G
1 CREAM TOPICAL
Qty: 0 | Refills: 0 | DISCHARGE

## 2020-02-20 RX ORDER — METHOTREXATE 2.5 MG/1
20 TABLET ORAL
Refills: 0 | Status: CANCELLED | OUTPATIENT
Start: 2020-02-25 | End: 2020-02-20

## 2020-02-20 RX ORDER — LEVOTHYROXINE SODIUM 125 MCG
25 TABLET ORAL DAILY
Refills: 0 | Status: DISCONTINUED | OUTPATIENT
Start: 2020-02-20 | End: 2020-02-20

## 2020-02-20 RX ORDER — SENNA PLUS 8.6 MG/1
2 TABLET ORAL AT BEDTIME
Refills: 0 | Status: DISCONTINUED | OUTPATIENT
Start: 2020-02-20 | End: 2020-02-20

## 2020-02-20 RX ORDER — FUROSEMIDE 40 MG
20 TABLET ORAL DAILY
Refills: 0 | Status: DISCONTINUED | OUTPATIENT
Start: 2020-02-20 | End: 2020-02-20

## 2020-02-20 RX ORDER — CARVEDILOL PHOSPHATE 80 MG/1
1 CAPSULE, EXTENDED RELEASE ORAL
Qty: 60 | Refills: 0
Start: 2020-02-20 | End: 2020-03-20

## 2020-02-20 RX ORDER — CHOLECALCIFEROL (VITAMIN D3) 125 MCG
1 CAPSULE ORAL
Qty: 0 | Refills: 0 | DISCHARGE

## 2020-02-20 RX ORDER — FOLIC ACID 0.8 MG
1 TABLET ORAL DAILY
Refills: 0 | Status: DISCONTINUED | OUTPATIENT
Start: 2020-02-20 | End: 2020-02-20

## 2020-02-20 RX ORDER — PREGABALIN 225 MG/1
1000 CAPSULE ORAL DAILY
Refills: 0 | Status: DISCONTINUED | OUTPATIENT
Start: 2020-02-20 | End: 2020-02-20

## 2020-02-20 RX ORDER — ACETAMINOPHEN 500 MG
650 TABLET ORAL EVERY 6 HOURS
Refills: 0 | Status: DISCONTINUED | OUTPATIENT
Start: 2020-02-20 | End: 2020-02-20

## 2020-02-20 RX ORDER — ASPIRIN/CALCIUM CARB/MAGNESIUM 324 MG
81 TABLET ORAL DAILY
Refills: 0 | Status: DISCONTINUED | OUTPATIENT
Start: 2020-02-20 | End: 2020-02-20

## 2020-02-20 RX ADMIN — Medication 25 MICROGRAM(S): at 06:44

## 2020-02-20 RX ADMIN — PREGABALIN 1000 MICROGRAM(S): 225 CAPSULE ORAL at 12:09

## 2020-02-20 RX ADMIN — Medication 81 MILLIGRAM(S): at 12:08

## 2020-02-20 RX ADMIN — Medication 1 MILLIGRAM(S): at 12:09

## 2020-02-20 RX ADMIN — CARVEDILOL PHOSPHATE 3.12 MILLIGRAM(S): 80 CAPSULE, EXTENDED RELEASE ORAL at 12:08

## 2020-02-20 RX ADMIN — Medication 5 MILLIGRAM(S): at 06:44

## 2020-02-20 NOTE — DISCHARGE NOTE PROVIDER - NSDCCPCAREPLAN_GEN_ALL_CORE_FT
PRINCIPAL DISCHARGE DIAGNOSIS  Diagnosis: Dyspnea, unspecified type  Assessment and Plan of Treatment: You came into the [facility] with worsening Shortness of breath. We worked you up for heart failure and Pulmonary embolism with all work up returning normal. On CT angio, there was minor evidence of Pulmonary HTN. It is imperative you follow continue to follow up with your PCP regarding this. You have been medcailly cleared for dc home to resume your previous medications. PRINCIPAL DISCHARGE DIAGNOSIS  Diagnosis: Dyspnea, unspecified type  Assessment and Plan of Treatment: You came into the [facility] with worsening Shortness of breath. We worked you up for heart failure and Pulmonary embolism with all work up returning normal. On CT angio, there was minor evidence of Pulmonary HTN. It is imperative you follow continue to follow up with your PCP regarding this. You have been medically cleared for dc home to resume your previous medications.

## 2020-02-20 NOTE — DISCHARGE NOTE PROVIDER - HOSPITAL COURSE
89F with an extensive medical history with PMH significant for Afib/ SVT with Loop Recorder in place, and HEpEF 55%. She presented to [facility] with Intermittent episodes of worsening exertional dyspnea. In the ED she was found to be Hypertensive, /79 with remaining VSS. On Physical Exam, mild bilateral edema was present. Laboratory Findings were significant for BNP and Serial Troponins both within normal limits. CXR displayed no acute pathology. Pt was admitted to telemetry for Acute Aspiratory Failure in the setting of possible PE vs CHF exacerbation. Cardio was consulted refuting CHF etiology, CTA was ordered to r/o possible PE and revealed no signs of an embolism, with suggestive findings of Pulmonary HTN. Her symptoms have improved and she has been medically cleared for discharge back home for regular follow up with her PCP, Dr. Toya Greenfield, Cardiologist Analilia Palmer and Kandice, who have been following her hospital course. 89F with an extensive medical history with PMH significant for Afib/ SVT with Loop Recorder in place, and HEpEF 55%. She presented to [facility] with Intermittent episodes of worsening exertional dyspnea. In the ED she was found to be Hypertensive, /79 with remaining VSS. On Physical Exam, mild bilateral edema was present. Laboratory Findings were significant for BNP and Serial Troponins both within normal limits. CXR displayed no acute pathology. Pt was admitted to telemetry for Acute Aspiratory Failure in the setting of possible PE vs CHF exacerbation. Cardio was consulted refuting CHF etiology, CTA was ordered to r/o possible PE and revealed no signs of an embolism, with suggestive findings of Pulmonary HTN. Her symptoms have improved and she has been medically cleared for discharge back home, to continue the Coreg Dr. Woodson initiated for her 6 sec on NSVT,  as the metoprolol was not well tolerated. Pt advised to continue to follow up with her PCP, Dr. Toya Greenfield, Cardiologist Analilia Palmer and Kandice, who have been following her hospital course.          Vital Signs Last 24 Hrs    T(C): 36.4 (2020 05:50), Max: 37.1 (2020 18:12)    T(F): 97.6 (2020 05:50), Max: 98.7 (2020 18:12)    HR: 57 (2020 05:50) (57 - 85)    BP: 130/45 (2020 05:50) (128/86 - 154/79)    RR: 17 (2020 05:50) (16 - 18)    SpO2: 98% (2020 05:50) (97% - 100%)        PHYSICAL EXAM:    Constitutional: NAD, awake and alert, well-developed / frail appearing     HEENT: PERR, EOMI, Normal Hearing, MMM    Neck: Soft and supple, No LAD, No JVD    Respiratory: Breath sounds are clear bilaterally, No wheezing, rales or rhonchi    Cardiovascular: S1 and S2, regular rate and rhythm, no Murmurs, gallops or rubs    Gastrointestinal: Bowel Sounds present, soft, nontender, nondistended, no guarding, no rebound    Extremities: Mild peripheral edema    Vascular: 2+ peripheral pulses    Neurological: A/O x 3, no focal deficits    Musculoskeletal: decreased yet equal 5/5 strength b/l upper and lower extremities    Skin: LUE bruising from IV insertion          LABS:                       11.5     6.12  )-----------( 261      ( 2020 06:35 )               35.3         2020 06:35        143    |  111    |  12       ----------------------------<  88       3.7     |  26     |  0.61         Ca    8.3        2020 06:35    Phos  3.2       2020 06:35    Mg     2.3       2020 06:35        TPro  5.9    /  Alb  3.0    /  TBili  1.3    /  DBili  x      /  AST  14     /  ALT  11     /  AlkPhos  53     2020 06:35        LIVER FUNCTIONS - ( 2020 06:35 )    Alb: 3.0 g/dL / Pro: 5.9 gm/dL / ALK PHOS: 53 U/L / ALT: 11 U/L / AST: 14 U/L / GGT: x               PT/INR - ( 2020 20:38 )   PT: 10.9 sec;   INR: 0.98 ratio           PTT - ( 2020 20:38 )  PTT:31.9 sec    CAPILLARY BLOOD GLUCOSE        CARDIAC MARKERS ( 2020 23:36 )    <0.015 ng/mL / x     / x     / x     / x        CARDIAC MARKERS ( 2020 20:38 )    <0.015 ng/mL / x     / x     / x     / x            Urinalysis Basic - ( 2020 19:00 )        Color: Yellow / Appearance: Clear / S.005 / pH: x    Gluc: x / Ketone: Negative  / Bili: Negative / Urobili: Negative mg/dL     Blood: x / Protein: Negative mg/dL / Nitrite: Negative     Leuk Esterase: Trace / RBC: 3-5 /HPF / WBC 3-5     Sq Epi: x / Non Sq Epi: Occasional / Bacteria: Occasional         Imaging Studies         < from: Xray Chest 1 View- PORTABLE-Urgent (20 @ 19:58) >        IMPRESSION:    No active pulmonary disease.        Further information may be obtained from the patient's subsequent CT of the chest which was pending at the time of this dictation.        < from: CT Angio Chest PE Protocol w/ IV Cont (20 @ 09:29) >            IMPRESSION:        No evidence of pulmonary embolism.     Pulmonary hypertension?.        < end of copied text >

## 2020-02-20 NOTE — CONSULT NOTE ADULT - SUBJECTIVE AND OBJECTIVE BOX
Patient is a 89y old  Female who presents with a chief complaint of Shortness of Breath      HPI:  89 y/o female with PMHx significant for HTN, AFib, SVT, ICH, CHF, Rheumatoid arthritis, Hypothyroidism, melanoma R eye, Anemia, breast cancer s/p Lumpectomy, b/l hearing loss presents to the ED yesterday 2020, after experiencing severe episode of shortness of breath on exertion while trying to walk to the restroom at ~ 4:30pm, which resolved after resting for a few minutes. Patient states that she has been experiencing intermittent episodes of shortness of breath on exertion over the past 2 months, which has been worsening, since she experiences SOB with most ADL, including showering and changing of clothes. She also noted some swelling to her legs over the past 2 weeks. Patient started sleeping on 2 pillows for about 2 years now, unable to lay flat stating that she does not feel well while lying flat. Denies PND, Chest pain, dizziness, SOB at rest, headaches or weakness. Patient has a loop recorder in place and had an EP consult yesterday with Dr. Olivo, metoprolol was discontinued and she was started on coreg and Lasix.       PAST MEDICAL & SURGICAL HISTORY:  Bilateral hearing loss, unspecified hearing loss type  History of anemia  Dry eyes, bilateral  SVT (supraventricular tachycardia)  Adhesive capsulitis of left shoulder  Mass of colon: Followed by Dr. Bang  Varicose veins  History of anemia  Melanoma of eye, right: Followed in [facility] by Opthamologist  Breast cancer: s/p lumpectomy  Goiter  HTN (hypertension)  Rheumatoid arthritis  Hypothyroid  ICH (intracerebral hemorrhage): : Followed by Dr. Guerrero  Afemmanuel  CHF (congestive heart failure)  S/P breast lumpectomy: Left   S/P colonoscopy: unsure of all dates; last one   S/P vein strippin  S/P cataract extraction: B/L;   History of appendectomy  H/O umbilical hernia repair  H/O bilateral hip replacements      MEDICATIONS  (STANDING):  aspirin enteric coated 81 milliGRAM(s) Oral daily  carvedilol 3.125 milliGRAM(s) Oral every 12 hours  cyanocobalamin 1000 MICROGram(s) Oral daily  folic acid 1 milliGRAM(s) Oral daily  furosemide    Tablet 20 milliGRAM(s) Oral daily  levothyroxine 25 MICROGram(s) Oral daily  predniSONE   Tablet 5 milliGRAM(s) Oral daily    MEDICATIONS  (PRN):  acetaminophen   Tablet .. 650 milliGRAM(s) Oral every 6 hours PRN Mild Pain (1 - 3 )  senna 2 Tablet(s) Oral at bedtime PRN Constipation      FAMILY HISTORY:  Family history of uterine cancer (Sibling)  Family history of diabetes mellitus  Family history of heart disease  Family history of emphysema: Father      SOCIAL HISTORY: no recent smoking     REVIEW OF SYSTEMS:  CONSTITUTIONAL:    No fatigue, malaise, lethargy.  No fever or chills.     RESPIRATORY:  No cough.  No wheeze.  No hemoptysis.  c/o shortness of breath.  CARDIOVASCULAR:  No chest pains.  No palpitations. c/o shortness of breath, No orthopnea or PND.  GASTROINTESTINAL:  No abdominal pain.  No nausea or vomiting.    GENITOURINARY:    No hematuria.    MUSCULOSKELETAL:  No musculoskeletal pain.  c/o joint swelling.  c/o arthritis.  NEUROLOGICAL:  No tingling or numbness or weakness.  PSYCHIATRIC:  No confusion          Vital Signs Last 24 Hrs  T(C): 36.4 (2020 05:50), Max: 37.1 (2020 18:12)  T(F): 97.6 (2020 05:50), Max: 98.7 (2020 18:12)  HR: 57 (2020 05:50) (57 - 85)  BP: 130/45 (2020 05:50) (128/86 - 154/79)  BP(mean): --  RR: 17 (2020 05:50) (16 - 18)  SpO2: 98% (2020 05:50) (97% - 100%)    PHYSICAL EXAM-    Constitutional: The patient appears to be normal, well developed, well nourished and alert and oriented to time, place and person. The patient does not appear acutely ill.     Head: Head is normocephalic and atraumatic.      Neck: No jugular venous distention. No audible carotid bruits. There are strong carotid pulses bilaterally. No JVD.     Cardiovascular: Regular rate and rhythm without S3, S4. No murmurs or rubs are appreciated.      Respiratory: Breathsounds are normal. No rales. No wheezing.    Abdomen: Soft, nontender, nondistended with positive bowel sounds.      Extremity: No tenderness. No  pitting edema     Neurologic: The patient is alert and oriented.      Skin: No rash, no obvious lesions noted.      Psychiatric: The patient appears to be emotionally stable.      INTERPRETATION OF TELEMETRY: SR     ECG: Sinus rythm , normal axis, no ST T changes.     I&O's Detail      LABS:                        11.5   6.12  )-----------( 261      ( 2020 06:35 )             35.3     02-20    143  |  111<H>  |  12  ----------------------------<  88  3.7   |  26  |  0.61    Ca    8.3<L>      2020 06:35  Phos  3.2     -20  Mg     2.3     -20    TPro  5.9<L>  /  Alb  3.0<L>  /  TBili  1.3<H>  /  DBili  x   /  AST  14<L>  /  ALT  11<L>  /  AlkPhos  53  -20    CARDIAC MARKERS ( 2020 23:36 )  <0.015 ng/mL / x     / x     / x     / x      CARDIAC MARKERS ( 2020 20:38 )  <0.015 ng/mL / x     / x     / x     / x          PT/INR - ( 2020 20:38 )   PT: 10.9 sec;   INR: 0.98 ratio         PTT - ( 2020 20:38 )  PTT:31.9 sec  Urinalysis Basic - ( 2020 19:00 )    Color: Yellow / Appearance: Clear / S.005 / pH: x  Gluc: x / Ketone: Negative  / Bili: Negative / Urobili: Negative mg/dL   Blood: x / Protein: Negative mg/dL / Nitrite: Negative   Leuk Esterase: Trace / RBC: 3-5 /HPF / WBC 3-5   Sq Epi: x / Non Sq Epi: Occasional / Bacteria: Occasional      I&O's Summary    BNPSerum Pro-Brain Natriuretic Peptide: 102 pg/mL ( @ 20:38)    RADIOLOGY & ADDITIONAL STUDIES:  < from: Xray Chest 2 Views PA/Lat (18 @ 14:05) >  EXAM:  XR CHEST PA LAT 2V                            PROCEDURE DATE:  2018          INTERPRETATION:  CLINICAL INDICATION:  preadmit/preop. EPS poss   supraventricular tachycardia ablation with carto and poss ppm/ loop mdt.    TECHNIQUE: PA and lateral chest radiographs were performed.    COMPARISON:  Chest radiograph dated 10/13/2017 and chest CT dated   1/3/2017.    FINDINGS:    The bilateral lung fields demonstrate no evidence for pneumothorax,   consolidation, vascular congestion, or pleural effusion.    The cardiac silhouette size is within normal limits. Aortic   calcifications are noted.    Calcific density projecting over the medial aspect of the proximal left   humerus, consistent with left glenohumeral joint loose body, as   visualized on chest CT of 1/3/2017. Redemonstration of severe   degenerative changes of the left shoulder.    Surgical clips project over the left lower thorax.    IMPRESSION:    No acute pulmonary pathology.            < end of copied text > Patient is a 89y old  Female who presents with a chief complaint of Shortness of Breath      HPI:  89 y/o female with PMHx significant for HTN, AFib, SVT, ICH, CHF, Rheumatoid arthritis, Hypothyroidism, melanoma R eye, Anemia, breast cancer s/p Lumpectomy, b/l hearing loss presents to the ED yesterday 2020, after experiencing severe episode of shortness of breath on exertion while trying to walk to the restroom at ~ 4:30pm, which resolved after resting for a few minutes. Patient states that she has been experiencing intermittent episodes of shortness of breath on exertion over the past 2 months, which has been worsening, since she experiences SOB with most ADL, including showering and changing of clothes. She also noted some swelling to her legs over the past 2 weeks. Patient started sleeping on 2 pillows for about 2 years now, unable to lay flat stating that she does not feel well while lying flat. Denies PND, Chest pain, dizziness, SOB at rest, headaches or weakness. Patient has a loop recorder in place and had an EP consult yesterday with Dr. Olivo, metoprolol was discontinued and she was started on coreg and Lasix.       PAST MEDICAL & SURGICAL HISTORY:  Bilateral hearing loss, unspecified hearing loss type  History of anemia  Dry eyes, bilateral  SVT (supraventricular tachycardia)  Adhesive capsulitis of left shoulder  Mass of colon: Followed by Dr. Bang  Varicose veins  History of anemia  Melanoma of eye, right: Followed in [facility] by Opthamologist  Breast cancer: s/p lumpectomy  Goiter  HTN (hypertension)  Rheumatoid arthritis  Hypothyroid  ICH (intracerebral hemorrhage): : Followed by Dr. Guerrero  Afemmanuel  CHF (congestive heart failure)  S/P breast lumpectomy: Left   S/P colonoscopy: unsure of all dates; last one   S/P vein strippin  S/P cataract extraction: B/L;   History of appendectomy  H/O umbilical hernia repair  H/O bilateral hip replacements      MEDICATIONS  (STANDING):  aspirin enteric coated 81 milliGRAM(s) Oral daily  carvedilol 3.125 milliGRAM(s) Oral every 12 hours  cyanocobalamin 1000 MICROGram(s) Oral daily  folic acid 1 milliGRAM(s) Oral daily  furosemide    Tablet 20 milliGRAM(s) Oral daily  levothyroxine 25 MICROGram(s) Oral daily  predniSONE   Tablet 5 milliGRAM(s) Oral daily    MEDICATIONS  (PRN):  acetaminophen   Tablet .. 650 milliGRAM(s) Oral every 6 hours PRN Mild Pain (1 - 3 )  senna 2 Tablet(s) Oral at bedtime PRN Constipation      FAMILY HISTORY:  Family history of uterine cancer (Sibling)  Family history of diabetes mellitus  Family history of heart disease  Family history of emphysema: Father      SOCIAL HISTORY: no recent smoking     REVIEW OF SYSTEMS:  CONSTITUTIONAL:    No fatigue, malaise, lethargy.  No fever or chills.     RESPIRATORY:  No cough.  No wheeze.  No hemoptysis.  c/o shortness of breath.  CARDIOVASCULAR:  No chest pains.  No palpitations. c/o shortness of breath, No orthopnea or PND.  GASTROINTESTINAL:  No abdominal pain.  No nausea or vomiting.    GENITOURINARY:    No hematuria.    MUSCULOSKELETAL:  No musculoskeletal pain.  c/o joint swelling.  c/o arthritis.  NEUROLOGICAL:  No tingling or numbness or weakness.  PSYCHIATRIC:  No confusion          Vital Signs Last 24 Hrs  T(C): 36.4 (2020 05:50), Max: 37.1 (2020 18:12)  T(F): 97.6 (2020 05:50), Max: 98.7 (2020 18:12)  HR: 57 (2020 05:50) (57 - 85)  BP: 130/45 (2020 05:50) (128/86 - 154/79)  BP(mean): --  RR: 17 (2020 05:50) (16 - 18)  SpO2: 98% (2020 05:50) (97% - 100%)    PHYSICAL EXAM-    Constitutional: The patient appears to be normal, well developed, well nourished and alert and oriented to time, place and person. The patient does not appear acutely ill.     Head: Head is normocephalic and atraumatic.      Neck: No jugular venous distention. No audible carotid bruits. There are strong carotid pulses bilaterally. No JVD.     Cardiovascular: Regular rate and rhythm without S3, S4. No murmurs or rubs are appreciated.      Respiratory: Breathsounds are normal. No rales. No wheezing.    Abdomen: Soft, nontender, nondistended with positive bowel sounds.      Extremity: No tenderness. No  pitting edema     Neurologic: The patient is alert and oriented.      Skin: No rash, no obvious lesions noted.      Psychiatric: The patient appears to be emotionally stable.      INTERPRETATION OF TELEMETRY: SR     ECG: Sinus rythm , L axis, no ST T changes.     I&O's Detail      LABS:                        11.5   6.12  )-----------( 261      ( 2020 06:35 )             35.3     02-20    143  |  111<H>  |  12  ----------------------------<  88  3.7   |  26  |  0.61    Ca    8.3<L>      2020 06:35  Phos  3.2     -20  Mg     2.3     -20    TPro  5.9<L>  /  Alb  3.0<L>  /  TBili  1.3<H>  /  DBili  x   /  AST  14<L>  /  ALT  11<L>  /  AlkPhos  53  -20    CARDIAC MARKERS ( 2020 23:36 )  <0.015 ng/mL / x     / x     / x     / x      CARDIAC MARKERS ( 2020 20:38 )  <0.015 ng/mL / x     / x     / x     / x          PT/INR - ( 2020 20:38 )   PT: 10.9 sec;   INR: 0.98 ratio         PTT - ( 2020 20:38 )  PTT:31.9 sec  Urinalysis Basic - ( 2020 19:00 )    Color: Yellow / Appearance: Clear / S.005 / pH: x  Gluc: x / Ketone: Negative  / Bili: Negative / Urobili: Negative mg/dL   Blood: x / Protein: Negative mg/dL / Nitrite: Negative   Leuk Esterase: Trace / RBC: 3-5 /HPF / WBC 3-5   Sq Epi: x / Non Sq Epi: Occasional / Bacteria: Occasional      I&O's Summary    BNPSerum Pro-Brain Natriuretic Peptide: 102 pg/mL ( @ 20:38)    RADIOLOGY & ADDITIONAL STUDIES:  < from: Xray Chest 2 Views PA/Lat (18 @ 14:05) >  EXAM:  XR CHEST PA LAT 2V                            PROCEDURE DATE:  2018          INTERPRETATION:  CLINICAL INDICATION:  preadmit/preop. EPS poss   supraventricular tachycardia ablation with carto and poss ppm/ loop mdt.    TECHNIQUE: PA and lateral chest radiographs were performed.    COMPARISON:  Chest radiograph dated 10/13/2017 and chest CT dated   1/3/2017.    FINDINGS:    The bilateral lung fields demonstrate no evidence for pneumothorax,   consolidation, vascular congestion, or pleural effusion.    The cardiac silhouette size is within normal limits. Aortic   calcifications are noted.    Calcific density projecting over the medial aspect of the proximal left   humerus, consistent with left glenohumeral joint loose body, as   visualized on chest CT of 1/3/2017. Redemonstration of severe   degenerative changes of the left shoulder.    Surgical clips project over the left lower thorax.    IMPRESSION:    No acute pulmonary pathology.            < end of copied text >

## 2020-02-20 NOTE — DISCHARGE NOTE PROVIDER - PROVIDER TOKENS
PROVIDER:[TOKEN:[[id number]],FOLLOWUP:[1 week],ESTABLISHEDPATIENT:[T]],PROVIDER:[[id number]],FOLLOWUP:[1 week],ESTABLISHEDPATIENT:[T]],PROVIDER:[TOKEN:[[id number]],FOLLOWUP:[1 week],ESTABLISHEDPATIENT:[T]]

## 2020-02-20 NOTE — CONSULT NOTE ADULT - ASSESSMENT
SOB- she does not appear volume overloaded.|No Hx of COPD.  Check CT angio Chest for PE.  Poor ambulation lately  D/W hospitalist team.    HTN_ continue home meds.    Other medical issues- Management per primary team.   Thank you for allowing me to participate in the care of this patient. Please feel free to contact me with any questions.

## 2020-02-20 NOTE — DISCHARGE NOTE PROVIDER - NSDCMRMEDTOKEN_GEN_ALL_CORE_FT
acetaminophen 325 mg oral tablet: 2 tab(s) orally every 6 hours  aspirin 81 mg oral tablet: 1 tab(s) orally once a day  folic acid 1 mg oral tablet: 3 tab(s) orally once a day  methotrexate 2.5 mg oral tablet: 8  orally once a week on tuesdays  predniSONE 5 mg oral tablet: 1 tab(s) orally once a day  Synthroid 25 mcg (0.025 mg) oral tablet: 1 tab(s) orally once a day  Vitamin B-12 1000 mcg oral tablet: 1 tab(s) orally once a day acetaminophen 325 mg oral tablet: 2 tab(s) orally every 6 hours  aspirin 81 mg oral tablet: 1 tab(s) orally once a day  carvedilol 3.125 mg oral tablet: 1 tab(s) orally every 12 hours  folic acid 1 mg oral tablet: 3 tab(s) orally once a day  methotrexate 2.5 mg oral tablet: 8  orally once a week on tuesdays  predniSONE 5 mg oral tablet: 1 tab(s) orally once a day  Synthroid 25 mcg (0.025 mg) oral tablet: 1 tab(s) orally once a day  Vitamin B-12 1000 mcg oral tablet: 1 tab(s) orally once a day

## 2020-02-20 NOTE — CONSULT NOTE ADULT - ASSESSMENT
89 y/o female with multiple medical problems including HTN, AFib, SVT, ICH, CHF, Rheumatoid arthritis, who is admitted for dyspnea.   -asymptomatic sinus bradycardia at night mainly, otherwise appropriate heart rate, no further work up needed  -ILR shows   -paroxysmal AF asymptomatic not on AC due to high risk for bleeding  -dyspnea, possibly chf vs PE continue work up as per Cardiology, CTPA, follow TTE  no further EP work up is needed at this time 89 y/o female with multiple medical problems including HTN, AFib, NSVT, ICH, CHF, Rheumatoid arthritis, who is admitted for dyspnea, she has acceptable functional status  -asymptomatic sinus bradycardia at night mainly, otherwise appropriate heart rate, no further work up needed  -ILR shows no recorded arrhtyhmia or bradycardia when pt was symptomatic yesterday; She had recent NSVT episode 6 seconds and was started on coreg (did not tolerate metoprolol in the past), if coreg not tolerated consider atenolol  -paroxysmal AF low burden asymptomatic not on AC due to high risk for bleeding  -dyspnea, possibly chf vs PE continue work up as per Cardiology, CTPA, follow TTE,

## 2020-02-20 NOTE — H&P ADULT - ASSESSMENT
89 y/o female with PMHx significant for HTN, AFib, SVT, ICH, CHF, LVEF 55% IN 2017, Rheumatoid arthritis, Hypothyroidism, melanoma R eye, Anemia, breast cancer s/p Lumpectomy, b/l hearing loss presents to the ED on 2/19/2020, after experiencing a severe episode of shortness of breath on exertion which subsequently resolved with rest.     Dyspnea on Exertion  -Admit to Tele  -Continuous tele monitoring   -Patient with Hx CHF, Most recent ECHO in 2017- with LVEF 55%  -EKG: Sinus bradycardia, 1st degree AV block   -Troponin negative x 2   -Wells Criteria - 0  -D Dimer 221  -Cardiology Consult  -EP consult   -Monitor vital signs     Chronic Heart Failure  -LVEF 55% (2017)  -Continue coreg 3.125mg po q12hrs   -Continue Lasix 20mg   -Repeat ECHO  -Cardiology consult    Sinus Bradycardia  -Noted on EKG and Telemetry   -HR: 58-60s  -EP consult   -Metoprolol d/c on 2/19/2020    Rheumatoid Arthritis  -Continue Prednisone 5mg po daily  -Continue Methotrexate 20mg po qweekly on Tuesdays     AFib  -Rate controlled    Hypothyroidism  -Continue synthroid 25mg po daily   -f/u TSH    VTE Prophylaxis  -IMPROVE VTE Score : 1  -SCDs 89 y/o female with PMHx significant for HTN, AFib, SVT, ICH, CHF, LVEF 55% IN 2017, Rheumatoid arthritis, Hypothyroidism, melanoma R eye, Anemia, breast cancer s/p Lumpectomy, b/l hearing loss presents to the ED on 2/19/2020, after experiencing a severe episode of shortness of breath on exertion which subsequently resolved with rest.     Dyspnea on Exertion  -Patient with worsening dyspnea on exertion over the past 2 months  -R/O Cardiac Arrhythmia or worsening of CHF, unlikely PE (Wells criteria - 0, D Dimer 221) or Infectious etiology given no other respiratory symptoms/cough/ fever or leukocytosis   -Admit to Medicine   -Continuous tele monitoring   -Patient with Hx CHF, Most recent ECHO in 2017- with LVEF 55%  -EKG: Sinus bradycardia, 1st degree AV block   -CXR: as above   -Troponin negative x 2   -Loop recorder in place   -Cardiology Consult  -EP consult   -Monitor vital signs     Chronic Heart Failure  -LVEF 55% (2017)  -Continue coreg 3.125mg po q12hrs   -Continue Lasix 20mg po daily (started on 2/19/2020)  -Repeat ECHO  -Cardiology consult    Sinus Bradycardia  -Noted on EKG and Telemetry   -1st degree AV block   -HR: 58-60s  -EP consult     Rheumatoid Arthritis  -Continue Prednisone 5mg po daily  -Continue Methotrexate 20mg po qweekly on Tuesdays     Hypothyroidism  -Continue synthroid 25mg po daily   -f/u TSH    VTE Prophylaxis  -IMPROVE VTE Score : 1  -SCDs    Above discussed with Dr. Amaya 89 y/o female with PMHx significant for HTN, AFib, SVT, ICH, CHF, LVEF 55% IN 2017, Rheumatoid arthritis, Hypothyroidism, melanoma R eye, Anemia, breast cancer s/p Lumpectomy, b/l hearing loss presents to the ED on 2/19/2020, after experiencing a severe episode of shortness of breath on exertion which subsequently resolved with rest.     Dyspnea on Exertion  -Patient with worsening dyspnea on exertion over the past 2 months  -Admit to Medicine   -Continuous tele monitoring   -R/O Cardiac Arrhythmia or worsening of CHF, unlikely PE (Wells criteria - 0, D Dimer 221) or Infectious etiology given no other respiratory symptoms/cough/ fever or leukocytosis   -No evidence of cardiac ischemia, Troponin negative x 2, EKG w/o ST changes   -h/h wnl, TSH 0.85  -Patient with Hx CHF, Most recent ECHO in 2017- with LVEF 55%  -EKG: Sinus bradycardia, 1st degree AV block   -CXR: as above   -Loop recorder in place   -Cardiology Consult  -EP consult   -Monitor vital signs     Chronic Heart Failure  -LVEF 55% (2017)  -Continue coreg 3.125mg po q12hrs   -Continue Lasix 20mg po daily (started on 2/19/2020)  -Repeat ECHO  -Cardiology consult    Sinus Bradycardia  -Noted on EKG and Telemetry   -1st degree AV block   -HR: 58-60s  -EP consult     Rheumatoid Arthritis  -Continue Prednisone 5mg po daily  -Continue Methotrexate 20mg po qweekly on Tuesdays     Hypothyroidism  -Continue synthroid 25mg po daily   -f/u TSH    VTE Prophylaxis  -IMPROVE VTE Score : 1  -SCDs    Above discussed with Dr. Amaya 89 y/o female with PMHx significant for HTN, AFib, SVT, ICH, CHF, LVEF 55% IN 2017, Rheumatoid arthritis, Hypothyroidism, melanoma R eye, Anemia, breast cancer s/p Lumpectomy, b/l hearing loss presents to the ED on 2/19/2020, after experiencing a severe episode of shortness of breath on exertion which subsequently resolved with rest.     Dyspnea on Exertion  -Patient with worsening dyspnea on exertion over the past 2 months  -Admit to Medicine   -Continuous tele monitoring   -R/O Cardiac Arrhythmia or worsening of CHF, unlikely PE (Wells criteria - 0, D Dimer 221) or Infectious etiology given no other respiratory symptoms/cough/ fever or leukocytosis   -No evidence of cardiac ischemia, Troponin negative x 2, EKG w/o ST changes   -h/h wnl, TSH 0.85  -Patient with Hx CHF, Most recent ECHO in 2017- with LVEF 55%  -EKG: Sinus bradycardia, 1st degree AV block   -CXR: as above   -Loop recorder in place   -Cardiology Consult  -EP consult   -Monitor vital signs     Chronic Heart Failure  -LVEF 55% (2017)  -Continue coreg 3.125mg po q12hrs   -Continue Lasix 20mg po daily (started on 2/19/2020)  -Repeat ECHO  -Cardiology consult    Sinus Bradycardia  -Noted on EKG and Telemetry   -1st degree AV block   -HR: 58-60s  -EP consult     Rheumatoid Arthritis  -Continue Prednisone 5mg po daily  -Continue Methotrexate 20mg po qweekly on Tuesdays     Hypothyroidism  -Continue synthroid 25mg po daily   -TSH 0.85    VTE Prophylaxis  -IMPROVE VTE Score : 1  -SCDs    Above discussed with Dr. Amaya

## 2020-02-20 NOTE — PHARMACOTHERAPY INTERVENTION NOTE - COMMENTS
called MD to clarify patient home dose of 20 mg weekly is correct. Both verified via Dr. May and Mr Luis's patient interview that dosage is correct.

## 2020-02-20 NOTE — H&P ADULT - NSICDXFAMILYHX_GEN_ALL_CORE_FT
FAMILY HISTORY:  Family history of diabetes mellitus  Family history of emphysema, Father  Family history of heart disease    Sibling  Still living? No  Family history of uterine cancer, Age at diagnosis: Age Unknown

## 2020-02-20 NOTE — CONSULT NOTE ADULT - SUBJECTIVE AND OBJECTIVE BOX
Patient is a 89y old  Female who presents with a chief complaint of Shortness of Breath (2020 08:05)    HPI:  89 y/o female with PMHx significant for HTN, AFib, SVT, ICH, CHF, Rheumatoid arthritis, Hypothyroidism, melanoma R eye, Anemia, breast cancer s/p Lumpectomy, b/l hearing loss presents to the ED yesterday 2020, after experiencing severe episode of shortness of breath on exertion while trying to walk to the restroom at ~ 4:30pm, which resolved after resting for a few minutes. Patient states that she has been experiencing intermittent episodes of shortness of breath on exertion over the past 2 months, which has been worsening, since she experiences SOB with most ADL, including showering and changing of clothes. She also noted some swelling to her legs over the past 2 weeks. Patient started sleeping on 2 pillows for about 2 years now, unable to lay flat stating that she does not feel well while lying flat. Denies PND, Chest pain, dizziness, SOB at rest, headaches or weakness. Patient has a loop recorder in place and had an EP consult yesterday with Dr. Aponte, metoprolol was discontinued and she was started on coreg and Lasix. (2020 01:59)     HPI reviewed, Pt seen and examined   EK/19/ SR 70 bpm, 1* AVB, LAHB    TELEMETRY: SR, sinus bradycardia to 46bpm during sleep hours      PAST MEDICAL & SURGICAL HISTORY:  Bilateral hearing loss, unspecified hearing loss type  History of anemia  Dry eyes, bilateral  SVT (supraventricular tachycardia)  Adhesive capsulitis of left shoulder  Mass of colon: Followed by Dr. Bang  Varicose veins  History of anemia  Melanoma of eye, right: Followed in [facility] by Opthamologist  Breast cancer: s/p lumpectomy  Goiter  HTN (hypertension)  Rheumatoid arthritis  Hypothyroid  ICH (intracerebral hemorrhage): : Followed by Dr. Cesar Stearns  CHF (congestive heart failure)  S/P breast lumpectomy: Left   S/P colonoscopy: unsure of all dates; last one   S/P vein strippin  S/P cataract extraction: B/L;   History of appendectomy  H/O umbilical hernia repair  H/O bilateral hip replacements      MEDICATIONS  (STANDING):  aspirin enteric coated 81 milliGRAM(s) Oral daily  carvedilol 3.125 milliGRAM(s) Oral every 12 hours  cyanocobalamin 1000 MICROGram(s) Oral daily  folic acid 1 milliGRAM(s) Oral daily  furosemide    Tablet 20 milliGRAM(s) Oral daily  levothyroxine 25 MICROGram(s) Oral daily  predniSONE   Tablet 5 milliGRAM(s) Oral daily    MEDICATIONS  (PRN):  acetaminophen   Tablet .. 650 milliGRAM(s) Oral every 6 hours PRN Mild Pain (1 - 3 )  senna 2 Tablet(s) Oral at bedtime PRN Constipation      Allergy: NKDA      SOCIAL HISTORY: Denies tobacco, etoh abuse or illicit drug use    FAMILY HISTORY:  Family history of uterine cancer (Sibling)  Family history of diabetes mellitus  Family history of heart disease  Family history of emphysema: Father        REVIEW OF SYSTEMS:    CONSTITUTIONAL:  As per HPI.  HEENT:  Eyes:  No diplopia or blurred vision. ENT:  No earache, sore throat or runny nose.  CARDIOVASCULAR:  No Dsypnea/Palpitations/Dizziness/Syncope, No pressure, squeezing, strangling, tightness, heaviness or aching about the chest, neck, axilla or epigastrium.  RESPIRATORY:  No cough, shortness of breath, PND or orthopnea.  GASTROINTESTINAL:  No nausea, vomiting or diarrhea.  GENITOURINARY:  No dysuria, frequency or urgency.  MUSCULOSKELETAL:  As per HPI.  SKIN:  No change in skin, hair or nails.  NEUROLOGIC:  No paresthesias, fasciculations, seizures or weakness.  PSYCHIATRIC:  No disorder of thought or mood.  ENDOCRINE:  No heat or cold intolerance, polyuria or polydipsia.  HEMATOLOGICAL:  No easy bruising or bleedings:    T(C): 36.4 (20 @ 05:50), Max: 37.1 (20 @ 18:12)  HR: 57 (20 @ 05:50) (57 - 85)  BP: 130/45 (20 @ 05:50) (128/86 - 154/79)  RR: 17 (20 @ 05:50) (16 - 18)  SpO2: 98% (20 @ 05:50) (97% - 100%)    PHYSICAL EXAMINATION:    GENERAL APPEARANCE:  Pt. is not currently dyspneic, in no distress. Pt. is alert, oriented, and pleasant.  HEENT:  Pupils are normal and react normally. No icterus. Mucous membranes well colored.  NECK:  Supple. No lymphadenopathy. Jugular venous pressure not elevated. Carotids equal.   HEART:   regular rate and rhythm/ Afib. There are no murmurs, rubs or gallops noted  CHEST:  Chest is clear to auscultation. Normal respiratory effort.  ABDOMEN:  Soft and nontender.   EXTREMITIES:  There is no edema, warm and dry.      I&O's Summary      LABS:                        11.5   6.12  )-----------( 261      ( 2020 06:35 )             35.3     02-20    143  |  111<H>  |  12  ----------------------------<  88  3.7   |  26  |  0.61    Ca    8.3<L>      2020 06:35  Phos  3.2     02-20  Mg     2.3     02-20    TPro  5.9<L>  /  Alb  3.0<L>  /  TBili  1.3<H>  /  DBili  x   /  AST  14<L>  /  ALT  11<L>  /  AlkPhos  53  02-20    LIVER FUNCTIONS - ( 2020 06:35 )  Alb: 3.0 g/dL / Pro: 5.9 gm/dL / ALK PHOS: 53 U/L / ALT: 11 U/L / AST: 14 U/L / GGT: x           PT/INR - ( 2020 20:38 )   PT: 10.9 sec;   INR: 0.98 ratio         PTT - ( 2020 20:38 )  PTT:31.9 sec  CARDIAC MARKERS ( 2020 23:36 )  <0.015 ng/mL / x     / x     / x     / x      CARDIAC MARKERS ( 2020 20:38 )  <0.015 ng/mL / x     / x     / x     / x          Urinalysis Basic - ( 2020 19:00 )    Color: Yellow / Appearance: Clear / S.005 / pH: x  Gluc: x / Ketone: Negative  / Bili: Negative / Urobili: Negative mg/dL   Blood: x / Protein: Negative mg/dL / Nitrite: Negative   Leuk Esterase: Trace / RBC: 3-5 /HPF / WBC 3-5   Sq Epi: x / Non Sq Epi: Occasional / Bacteria: Occasional Patient is a 89y old  Female who presents with a chief complaint of Shortness of Breath (2020 08:05)    HPI:  89 y/o female with PMHx significant for HTN, AFib, NSVT, HFPEF, SVT, ICH, CHF, Rheumatoid arthritis, Hypothyroidism, melanoma R eye, Anemia, breast cancer s/p Lumpectomy, b/l hearing loss presents to the ED yesterday 2020, after experiencing severe episode of shortness of breath on exertion while trying to walk to the restroom at ~ 4:30pm, which resolved after resting for a few minutes. Patient states that she has been experiencing intermittent episodes of shortness of breath on exertion over the past 2 months, which has been worsening, since she experiences SOB with most ADL, including showering and changing of clothes. She also noted some swelling to her legs over the past 2 weeks. Patient started sleeping on 2 pillows for about 2 years now, unable to lay flat stating that she does not feel well while lying flat. Denies PND, Chest pain, dizziness, SOB at rest, headaches or weakness. Patient has a loop recorder in place and had an EP consult yesterday with Dr. Aponte, metoprolol was discontinued and she was started on coreg and Lasix. (2020 01:59)     HPI reviewed, Pt seen and examined reports worsening dyspnea with minimal exertion, denies palpitation dizziness, chest pain, she has good functional status  EK/19/ SR 70 bpm, 1* AVB, LAHB  TTE from 2019 at [facility] shows preserved LVEF diastolic dysfunction.   TELEMETRY: SR, sinus bradycardia to 46bpm during sleep hours      PAST MEDICAL & SURGICAL HISTORY:  Bilateral hearing loss, unspecified hearing loss type  History of anemia  Dry eyes, bilateral  SVT (supraventricular tachycardia)  Adhesive capsulitis of left shoulder  Mass of colon: Followed by Dr. Bang  Varicose veins  History of anemia  Melanoma of eye, right: Followed in [facility] by Opthamologist  Breast cancer: s/p lumpectomy  Goiter  HTN (hypertension)  Rheumatoid arthritis  Hypothyroid  ICH (intracerebral hemorrhage): : Followed by Dr. Cesar Stearns  CHF (congestive heart failure)  S/P breast lumpectomy: Left   S/P colonoscopy: unsure of all dates; last one   S/P vein strippin  S/P cataract extraction: B/L; 2013  History of appendectomy  H/O umbilical hernia repair  H/O bilateral hip replacements      MEDICATIONS  (STANDING):  aspirin enteric coated 81 milliGRAM(s) Oral daily  carvedilol 3.125 milliGRAM(s) Oral every 12 hours  cyanocobalamin 1000 MICROGram(s) Oral daily  folic acid 1 milliGRAM(s) Oral daily  furosemide    Tablet 20 milliGRAM(s) Oral daily  levothyroxine 25 MICROGram(s) Oral daily  predniSONE   Tablet 5 milliGRAM(s) Oral daily    MEDICATIONS  (PRN):  acetaminophen   Tablet .. 650 milliGRAM(s) Oral every 6 hours PRN Mild Pain (1 - 3 )  senna 2 Tablet(s) Oral at bedtime PRN Constipation      Allergy: NKDA      SOCIAL HISTORY: Denies tobacco, etoh abuse or illicit drug use    FAMILY HISTORY:  Family history of uterine cancer (Sibling)  Family history of diabetes mellitus  Family history of heart disease  Family history of emphysema: Father        REVIEW OF SYSTEMS:    CONSTITUTIONAL:  As per HPI.  HEENT:  Eyes:  No diplopia or blurred vision. ENT:  No earache, sore throat or runny nose.  CARDIOVASCULAR:  No Dsypnea/Palpitations/Dizziness/Syncope, No pressure, squeezing, strangling, tightness, heaviness or aching about the chest, neck, axilla or epigastrium.  RESPIRATORY:  No cough, shortness of breath, PND or orthopnea.  GASTROINTESTINAL:  No nausea, vomiting or diarrhea.  GENITOURINARY:  No dysuria, frequency or urgency.  MUSCULOSKELETAL:  As per HPI.  SKIN:  No change in skin, hair or nails.  NEUROLOGIC:  No paresthesias, fasciculations, seizures or weakness.  PSYCHIATRIC:  No disorder of thought or mood.  ENDOCRINE:  No heat or cold intolerance, polyuria or polydipsia.  HEMATOLOGICAL:  No easy bruising or bleedings:    T(C): 36.4 (20 @ 05:50), Max: 37.1 (20 @ 18:12)  HR: 57 (20 @ 05:50) (57 - 85)  BP: 130/45 (20 @ 05:50) (128/86 - 154/79)  RR: 17 (20 @ 05:50) (16 - 18)  SpO2: 98% (02-20-20 @ 05:50) (97% - 100%)    PHYSICAL EXAMINATION:    GENERAL APPEARANCE:  Pt. is not currently dyspneic, in no distress. Pt. is alert, oriented, and pleasant.  HEENT:  Pupils are normal and react normally. No icterus. Mucous membranes well colored.  NECK:  Supple. No lymphadenopathy. Jugular venous pressure not elevated. Carotids equal.   HEART:   regular rate and rhythm/ Afib. There are no murmurs, rubs or gallops noted  CHEST:  Chest is clear to auscultation. Normal respiratory effort.  ABDOMEN:  Soft and nontender.   EXTREMITIES:  There is no edema, warm and dry.      I&O's Summary      LABS:                        11.5   6.12  )-----------( 261      ( 2020 06:35 )             35.3     02-20    143  |  111<H>  |  12  ----------------------------<  88  3.7   |  26  |  0.61    Ca    8.3<L>      2020 06:35  Phos  3.2     02-20  Mg     2.3     02-20    TPro  5.9<L>  /  Alb  3.0<L>  /  TBili  1.3<H>  /  DBili  x   /  AST  14<L>  /  ALT  11<L>  /  AlkPhos  53  02-20    LIVER FUNCTIONS - ( 2020 06:35 )  Alb: 3.0 g/dL / Pro: 5.9 gm/dL / ALK PHOS: 53 U/L / ALT: 11 U/L / AST: 14 U/L / GGT: x           PT/INR - ( 2020 20:38 )   PT: 10.9 sec;   INR: 0.98 ratio         PTT - ( 2020 20:38 )  PTT:31.9 sec  CARDIAC MARKERS ( 2020 23:36 )  <0.015 ng/mL / x     / x     / x     / x      CARDIAC MARKERS ( 2020 20:38 )  <0.015 ng/mL / x     / x     / x     / x          Urinalysis Basic - ( 2020 19:00 )    Color: Yellow / Appearance: Clear / S.005 / pH: x  Gluc: x / Ketone: Negative  / Bili: Negative / Urobili: Negative mg/dL   Blood: x / Protein: Negative mg/dL / Nitrite: Negative   Leuk Esterase: Trace / RBC: 3-5 /HPF / WBC 3-5   Sq Epi: x / Non Sq Epi: Occasional / Bacteria: Occasional

## 2020-02-20 NOTE — H&P ADULT - NSHPREVIEWOFSYSTEMS_GEN_ALL_CORE
REVIEW OF SYSTEMS:    CONSTITUTIONAL: No weakness, fevers or chills  EYES/ENT: No visual changes;  No vertigo or throat pain   NECK: No pain or stiffness  RESPIRATORY: No cough, wheezing, hemoptysis  CARDIOVASCULAR: No chest pain or palpitations, + SOB on exertion   GASTROINTESTINAL: No abdominal or epigastric pain. No nausea, vomiting, or hematemesis; No diarrhea or constipation. No melena or hematochezia.  GENITOURINARY: No dysuria, frequency or hematuria  NEUROLOGICAL: No numbness or weakness  SKIN: No itching, rashes  EXT: + Lower extremity swelling

## 2020-02-20 NOTE — H&P ADULT - ATTENDING COMMENTS
89 y/o female with PMHx significant for HTN, AFib, SVT, ICH, CHF, Rheumatoid arthritis, Hypothyroidism, melanoma R eye, Anemia, breast cancer s/p Lumpectomy, b/l hearing loss presents to the ED yesterday 19/2/2020, after experiencing severe episode of shortness of breath on exertion while trying to walk to the restroom at ~ 4:30pm, which resolved after resting for a few minutes. Patient states that she has been experiencing intermittent episodes of shortness of breath on exertion over the past 2 months, which has been worsening, since she experiences SOB with most ADL, including showering and changing of clothes. She also noted some swelling to her legs over the past 2 weeks. Patient started sleeping on 2 pillows for about 2 years now, unable to lay flat stating that she does not feel well while lying flat. Denies PND, Chest pain, dizziness, SOB at rest, headaches or weakness. Patient has a loop recorder in place and had an EP consult yesterday with Dr. Olivo, metoprolol was discontinued and she was started on coreg and Lasix.   ROS: as above.  On exam: as above.  A/P:  Dyspnea on Exertion  -Patient with worsening dyspnea on exertion over the past 2 months  -Continuous tele monitoring   -R/O Cardiac Arrhythmia or worsening of CHF, unlikely PE (Wells criteria - 0, D Dimer 221) or Infectious etiology given no other respiratory symptoms/cough/ fever or leukocytosis   -Cardiology Consult  -EP consult     Chronic Heart Failure  -LVEF 55% (2017)  -Continue coreg 3.125mg po q12hrs   -Continue Lasix 20mg po daily (started on 2/19/2020)  -Repeat ECHO  -Cardiology consult    Sinus Bradycardia  -Noted on EKG and Telemetry   -1st degree AV block   -HR: 58-60s  -EP consult     Rheumatoid Arthritis  -Continue Prednisone 5mg po daily  -Continue Methotrexate 20mg po qweekly on Tuesdays     Hypothyroidism  -Continue synthroid 25mg po daily   -TSH 0.85    VTE Prophylaxis  -IMPROVE VTE Score : 1  -SCDs    Patient is seen and examined with the resident at the bedside. She presented with shortness of breath. No chest pain. May have associated arrythmia. Will monitor in telemetry. follow cardiology consult and echo report.

## 2020-02-20 NOTE — H&P ADULT - HISTORY OF PRESENT ILLNESS
89 y/o female with PMHx significant for HTN, AFib, SVT, ICH, CHF, Rheumatoid arthritis, Hypothyroidism, melanoma R eye, Anemia, breast cancer s/p Lumpectomy, b/l hearing loss presents to the ED yesterday 19/2/2020, after experiencing severe episode of shortness of breath on exertion while trying to walk to the restroom at ~ 4:30pm, which resolved after resting for a few minutes. Patient states that she has been experiencing intermittent episodes of shortness of breath on exertion over the past 2 months, which has been worsening, since she experiences SOB with most ADL, including showering and changing of clothes. Patient started sleeping on 2 pillows for about 2 years now, unable to lay flat stating that she does not feel well while lying flat. Denies PND, Chest pain, dizziness, SOB at rest, headaches or weakness. Patient states that she has a 89 y/o female with PMHx significant for HTN, AFib, SVT, ICH, CHF, Rheumatoid arthritis, Hypothyroidism, melanoma R eye, Anemia, breast cancer s/p Lumpectomy, b/l hearing loss presents to the ED yesterday 19/2/2020, after experiencing severe episode of shortness of breath on exertion while trying to walk to the restroom at ~ 4:30pm, which resolved after resting for a few minutes. Patient states that she has been experiencing intermittent episodes of shortness of breath on exertion over the past 2 months, which has been worsening, since she experiences SOB with most ADL, including showering and changing of clothes. She also noted some swelling to her legs over the past 2 weeks. Patient started sleeping on 2 pillows for about 2 years now, unable to lay flat stating that she does not feel well while lying flat. Denies PND, Chest pain, dizziness, SOB at rest, headaches or weakness. According to patient she has cardiac monitoring one with Holter monitor, and had an EP consult yesterday with Dr. Olivo, metoprolol was discontinued and she was started on coreg and Lasix. 89 y/o female with PMHx significant for HTN, AFib, SVT, ICH, CHF, Rheumatoid arthritis, Hypothyroidism, melanoma R eye, Anemia, breast cancer s/p Lumpectomy, b/l hearing loss presents to the ED yesterday 19/2/2020, after experiencing severe episode of shortness of breath on exertion while trying to walk to the restroom at ~ 4:30pm, which resolved after resting for a few minutes. Patient states that she has been experiencing intermittent episodes of shortness of breath on exertion over the past 2 months, which has been worsening, since she experiences SOB with most ADL, including showering and changing of clothes. She also noted some swelling to her legs over the past 2 weeks. Patient started sleeping on 2 pillows for about 2 years now, unable to lay flat stating that she does not feel well while lying flat. Denies PND, Chest pain, dizziness, SOB at rest, headaches or weakness. Patient has a loop recorder in place and had an EP consult yesterday with Dr. Olivo, metoprolol was discontinued and she was started on coreg and Lasix.

## 2020-02-20 NOTE — CHART NOTE - NSCHARTNOTEFT_GEN_A_CORE
Pt seen and examined with house staff.  Plan formulated and reviewed on rounds     Briefly,  89 y/o female resides at home and care for sick daughter admitted with intermittent acute onset of SOB.  She has hypothyroid, SVT s/p Loop, HFpEF and RA on MTX and steroids.  Chest CTA--no PE or infiltrates.  Trop negative X 2  No more SOB  ROS o/w negative     Awake and alert  NAD  Stable vitals  no fever    No evidence of PE or decompensated HF    Cont with current meds and plan for DC home today if possible

## 2020-02-20 NOTE — H&P ADULT - NSHPSOCIALHISTORY_GEN_ALL_CORE
Patient lives at home with daughter, who she cares for, ambulates independently normally, uses rolling walker at nights to go to the restroom, no EtOH use, former smoker - quit 1972

## 2020-02-20 NOTE — H&P ADULT - NSHPPHYSICALEXAM_GEN_ALL_CORE
Vital Signs Last 24 Hrs  T(C): 36.3 (20 Feb 2020 00:15), Max: 37.1 (19 Feb 2020 18:12)  T(F): 97.4 (20 Feb 2020 00:15), Max: 98.7 (19 Feb 2020 18:12)  HR: 67 (20 Feb 2020 00:15) (61 - 85)  BP: 154/79 (20 Feb 2020 00:15) (128/86 - 154/79)  RR: 16 (20 Feb 2020 00:15) (16 - 18)  SpO2: 98% (20 Feb 2020 00:15) (97% - 100%)    PHYSICAL EXAM:  GENERAL: NAD, lying in bed comfortably  HEAD:  Atraumatic, Normocephalic  EYES: EOMI, PERRLA, conjunctiva and sclera clear  ENT: Moist mucous membranes  NECK: Supple, No JVD  CHEST/LUNG: Clear to auscultation bilaterally; No rales, rhonchi, wheezing  HEART: bradycardic, No murmurs, rubs, or gallops  ABDOMEN: Bowel sounds present; Soft, Nontender, Nondistended  EXTREMITIES:  Mild b/l pedal edema   NERVOUS SYSTEM:  Alert & Oriented X3, speech clear. No deficits   MSK: FROM all 4 extremities, full and equal strength Vital Signs Last 24 Hrs  T(C): 36.3 (20 Feb 2020 00:15), Max: 37.1 (19 Feb 2020 18:12)  T(F): 97.4 (20 Feb 2020 00:15), Max: 98.7 (19 Feb 2020 18:12)  HR: 67 (20 Feb 2020 00:15) (61 - 85)  BP: 154/79 (20 Feb 2020 00:15) (128/86 - 154/79)  RR: 16 (20 Feb 2020 00:15) (16 - 18)  SpO2: 98% (20 Feb 2020 00:15) (97% - 100%)    PHYSICAL EXAM:  GENERAL: NAD, lying in bed comfortably  HEAD:  Atraumatic, Normocephalic  EYES: EOMI, PERRLA, conjunctiva and sclera clear  ENT: Moist mucous membranes  NECK: Supple, No JVD  CHEST/LUNG: Crackles to b/l lung base, No rhonchi, wheezing  HEART: bradycardic, No murmurs, rubs, or gallops  ABDOMEN: Bowel sounds present; Soft, Nontender, Nondistended  EXTREMITIES:  Mild b/l pedal edema   NERVOUS SYSTEM:  Alert & Oriented X3, speech clear. No deficits   MSK: FROM all 4 extremities, full and equal strength Vital Signs Last 24 Hrs  T(C): 36.3 (20 Feb 2020 00:15), Max: 37.1 (19 Feb 2020 18:12)  T(F): 97.4 (20 Feb 2020 00:15), Max: 98.7 (19 Feb 2020 18:12)  HR: 67 (20 Feb 2020 00:15) (61 - 85)  BP: 154/79 (20 Feb 2020 00:15) (128/86 - 154/79)  RR: 16 (20 Feb 2020 00:15) (16 - 18)  SpO2: 98% (20 Feb 2020 00:15) (97% - 100%)    PHYSICAL EXAM:  GENERAL: NAD, lying in bed comfortably  HEAD:  Atraumatic, Normocephalic  EYES: EOMI, PERRLA, conjunctiva and sclera clear  ENT: Moist mucous membranes  NECK: Supple, No JVD  CHEST/LUNG: Clear to auscultation bilaterally; No rales, rhonchi, wheezing  HEART: Regular rate and rhythm; No murmurs, rubs, or gallops  ABDOMEN: Bowel sounds present; Soft, Nontender  EXTREMITIES:  Minimal b/l pedal edema   NERVOUS SYSTEM:  Alert & Oriented X3, speech clear. No deficits   MSK: FROM all 4 extremities, full and equal strength

## 2020-02-20 NOTE — H&P ADULT - NSICDXPASTMEDICALHX_GEN_ALL_CORE_FT
PAST MEDICAL HISTORY:  Adhesive capsulitis of left shoulder     Afib     Bilateral hearing loss, unspecified hearing loss type     Breast cancer s/p lumpectomy    CHF (congestive heart failure)     Dry eyes, bilateral     Goiter     History of anemia     History of anemia     HTN (hypertension)     Hypothyroid     ICH (intracerebral hemorrhage) 2014: Followed by Dr. Cesar Leigh of colon Followed by Dr. Bang    Melanoma of eye, right Followed in [facility] by Opthamologist    Rheumatoid arthritis     SVT (supraventricular tachycardia)     Varicose veins

## 2020-02-20 NOTE — DISCHARGE NOTE NURSING/CASE MANAGEMENT/SOCIAL WORK - PATIENT PORTAL LINK FT
You can access the FollowMyHealth Patient Portal offered by [facility] by registering at the following website: [email or web address]. By joining [facility]’s FollowMyHealth portal, you will also be able to view your health information using other applications (apps) compatible with our system.

## 2020-02-20 NOTE — DISCHARGE NOTE PROVIDER - CARE PROVIDERS DIRECT ADDRESSES
[email or web address]

## 2020-02-20 NOTE — DISCHARGE NOTE PROVIDER - CARE PROVIDER_API CALL
Markell Aponte)  Cardiology; Internal Medicine  [address] [place] [address]  Phone: [phone]  Fax: [phone]  Established Patient  Follow Up Time: 1 week    Ravin Palmer)  Cardiovascular Disease; Nuclear Cardiology  [address] [place] [address]  Phone: [phone]  Fax: [phone]  Established Patient  Follow Up Time: 1 week    Toya Greenfield (DO)  Family Medicine  [address] [place] [address], [place] [address]  Phone: [phone]  Fax: [phone]  Established Patient  Follow Up Time: 1 week

## 2020-02-25 DIAGNOSIS — I27.20 PULMONARY HYPERTENSION, UNSPECIFIED: ICD-10-CM

## 2020-02-25 DIAGNOSIS — Z79.899 OTHER LONG TERM (CURRENT) DRUG THERAPY: ICD-10-CM

## 2020-02-25 DIAGNOSIS — I47.2 VENTRICULAR TACHYCARDIA: ICD-10-CM

## 2020-02-25 DIAGNOSIS — Z87.891 PERSONAL HISTORY OF NICOTINE DEPENDENCE: ICD-10-CM

## 2020-02-25 DIAGNOSIS — R06.00 DYSPNEA, UNSPECIFIED: ICD-10-CM

## 2020-02-25 DIAGNOSIS — Z79.82 LONG TERM (CURRENT) USE OF ASPIRIN: ICD-10-CM

## 2020-02-25 DIAGNOSIS — Z88.0 ALLERGY STATUS TO PENICILLIN: ICD-10-CM

## 2020-02-25 DIAGNOSIS — I11.0 HYPERTENSIVE HEART DISEASE WITH HEART FAILURE: ICD-10-CM

## 2020-02-25 DIAGNOSIS — Z88.2 ALLERGY STATUS TO SULFONAMIDES: ICD-10-CM

## 2020-02-25 DIAGNOSIS — I48.0 PAROXYSMAL ATRIAL FIBRILLATION: ICD-10-CM

## 2020-02-25 DIAGNOSIS — Z85.820 PERSONAL HISTORY OF MALIGNANT MELANOMA OF SKIN: ICD-10-CM

## 2020-02-25 DIAGNOSIS — Z79.52 LONG TERM (CURRENT) USE OF SYSTEMIC STEROIDS: ICD-10-CM

## 2020-02-25 DIAGNOSIS — E03.9 HYPOTHYROIDISM, UNSPECIFIED: ICD-10-CM

## 2020-02-25 DIAGNOSIS — M06.9 RHEUMATOID ARTHRITIS, UNSPECIFIED: ICD-10-CM

## 2020-02-25 DIAGNOSIS — D64.9 ANEMIA, UNSPECIFIED: ICD-10-CM

## 2020-02-25 DIAGNOSIS — R00.1 BRADYCARDIA, UNSPECIFIED: ICD-10-CM

## 2020-02-25 DIAGNOSIS — Z85.3 PERSONAL HISTORY OF MALIGNANT NEOPLASM OF BREAST: ICD-10-CM

## 2020-02-25 DIAGNOSIS — I50.32 CHRONIC DIASTOLIC (CONGESTIVE) HEART FAILURE: ICD-10-CM

## 2020-04-10 NOTE — ED PROVIDER NOTE - NS_EDPROVIDERDISPOUSERTYPE_ED_A_ED
Central Prior Authorization Team   [phone]    PA Initiation    Medication: Cari Ellipta [id number]   Insurance Company: [employer] - Phone [phone] Fax [phone]  Pharmacy Filling the Rx: [id number] - [place], [place] - [address]  Filling Pharmacy Phone: [phone]  Filling Pharmacy Fax: [phone]  Start Date: 4/10/2020             Scribe Attestation (For Scribes USE Only)... Scribe Attestation (For Scribes USE Only).../Attending Attestation (For Attendings USE Only)...

## 2020-05-28 NOTE — PROVIDER CONTACT NOTE (OTHER) - REASON
Consult- Gastroenterology Kaelyn Chery, resident MD: pt has +UA and started on ceftriaxone Kaelyn Chery, resident MD: spoke with daughter and discussed admission for treatment of UTI and PT evaluation. discussed +UA, abx, and plan for admission with pt.

## 2020-06-11 NOTE — H&P PST ADULT - RS GEN HX ROS MEA POS PC
Pt reports 80% improvement on the right side  Left side 50% improvement post inj   Pain level 2/10   Pt aware takes up to 2wks for full effect dyspnea

## 2020-08-10 ENCOUNTER — INPATIENT (INPATIENT)
Facility: HOSPITAL | Age: 85
LOS: 3 days | Discharge: NURSING, REHAB OR LONG-TERM CARE | DRG: 309 | End: 2020-08-14
Attending: INTERNAL MEDICINE | Admitting: INTERNAL MEDICINE
Payer: MEDICARE

## 2020-08-10 VITALS
SYSTOLIC BLOOD PRESSURE: 116 MMHG | WEIGHT: 154.98 LBS | RESPIRATION RATE: 18 BRPM | HEART RATE: 104 BPM | DIASTOLIC BLOOD PRESSURE: 64 MMHG | HEIGHT: 60 IN | OXYGEN SATURATION: 98 % | TEMPERATURE: 98 F

## 2020-08-10 DIAGNOSIS — Z98.49 CATARACT EXTRACTION STATUS, UNSPECIFIED EYE: Chronic | ICD-10-CM

## 2020-08-10 DIAGNOSIS — Z96.643 PRESENCE OF ARTIFICIAL HIP JOINT, BILATERAL: Chronic | ICD-10-CM

## 2020-08-10 DIAGNOSIS — Z98.890 OTHER SPECIFIED POSTPROCEDURAL STATES: Chronic | ICD-10-CM

## 2020-08-10 DIAGNOSIS — Z90.49 ACQUIRED ABSENCE OF OTHER SPECIFIED PARTS OF DIGESTIVE TRACT: Chronic | ICD-10-CM

## 2020-08-10 DIAGNOSIS — I48.91 UNSPECIFIED ATRIAL FIBRILLATION: ICD-10-CM

## 2020-08-10 PROBLEM — C50.919 MALIGNANT NEOPLASM OF UNSPECIFIED SITE OF UNSPECIFIED FEMALE BREAST: Chronic | Status: ACTIVE | Noted: 2017-10-11

## 2020-08-10 PROBLEM — Z86.2 PERSONAL HISTORY OF DISEASES OF THE BLOOD AND BLOOD-FORMING ORGANS AND CERTAIN DISORDERS INVOLVING THE IMMUNE MECHANISM: Chronic | Status: ACTIVE | Noted: 2017-10-11

## 2020-08-10 LAB
ALBUMIN SERPL ELPH-MCNC: 3.3 G/DL — SIGNIFICANT CHANGE UP (ref 3.3–5)
ALP SERPL-CCNC: 53 U/L — SIGNIFICANT CHANGE UP (ref 40–120)
ALT FLD-CCNC: 26 U/L — SIGNIFICANT CHANGE UP (ref 12–78)
ANION GAP SERPL CALC-SCNC: 7 MMOL/L — SIGNIFICANT CHANGE UP (ref 5–17)
AST SERPL-CCNC: 28 U/L — SIGNIFICANT CHANGE UP (ref 15–37)
BASOPHILS # BLD AUTO: 0.02 K/UL — SIGNIFICANT CHANGE UP (ref 0–0.2)
BASOPHILS NFR BLD AUTO: 0.3 % — SIGNIFICANT CHANGE UP (ref 0–2)
BILIRUB SERPL-MCNC: 1.4 MG/DL — HIGH (ref 0.2–1.2)
BUN SERPL-MCNC: 12 MG/DL — SIGNIFICANT CHANGE UP (ref 7–23)
CALCIUM SERPL-MCNC: 8.6 MG/DL — SIGNIFICANT CHANGE UP (ref 8.5–10.1)
CHLORIDE SERPL-SCNC: 112 MMOL/L — HIGH (ref 96–108)
CK SERPL-CCNC: 48 U/L — SIGNIFICANT CHANGE UP (ref 26–192)
CO2 SERPL-SCNC: 24 MMOL/L — SIGNIFICANT CHANGE UP (ref 22–31)
CREAT SERPL-MCNC: 0.72 MG/DL — SIGNIFICANT CHANGE UP (ref 0.5–1.3)
EOSINOPHIL # BLD AUTO: 0.01 K/UL — SIGNIFICANT CHANGE UP (ref 0–0.5)
EOSINOPHIL NFR BLD AUTO: 0.1 % — SIGNIFICANT CHANGE UP (ref 0–6)
GLUCOSE SERPL-MCNC: 115 MG/DL — HIGH (ref 70–99)
HCT VFR BLD CALC: 39.5 % — SIGNIFICANT CHANGE UP (ref 34.5–45)
HGB BLD-MCNC: 12.7 G/DL — SIGNIFICANT CHANGE UP (ref 11.5–15.5)
IMM GRANULOCYTES NFR BLD AUTO: 0.3 % — SIGNIFICANT CHANGE UP (ref 0–1.5)
LYMPHOCYTES # BLD AUTO: 1.31 K/UL — SIGNIFICANT CHANGE UP (ref 1–3.3)
LYMPHOCYTES # BLD AUTO: 16.5 % — SIGNIFICANT CHANGE UP (ref 13–44)
MCHC RBC-ENTMCNC: 32.2 GM/DL — SIGNIFICANT CHANGE UP (ref 32–36)
MCHC RBC-ENTMCNC: 33.4 PG — SIGNIFICANT CHANGE UP (ref 27–34)
MCV RBC AUTO: 103.9 FL — HIGH (ref 80–100)
MONOCYTES # BLD AUTO: 0.42 K/UL — SIGNIFICANT CHANGE UP (ref 0–0.9)
MONOCYTES NFR BLD AUTO: 5.3 % — SIGNIFICANT CHANGE UP (ref 2–14)
NEUTROPHILS # BLD AUTO: 6.15 K/UL — SIGNIFICANT CHANGE UP (ref 1.8–7.4)
NEUTROPHILS NFR BLD AUTO: 77.5 % — HIGH (ref 43–77)
PLATELET # BLD AUTO: 273 K/UL — SIGNIFICANT CHANGE UP (ref 150–400)
POTASSIUM SERPL-MCNC: 4.8 MMOL/L — SIGNIFICANT CHANGE UP (ref 3.5–5.3)
POTASSIUM SERPL-SCNC: 4.8 MMOL/L — SIGNIFICANT CHANGE UP (ref 3.5–5.3)
PROT SERPL-MCNC: 7 GM/DL — SIGNIFICANT CHANGE UP (ref 6–8.3)
RBC # BLD: 3.8 M/UL — SIGNIFICANT CHANGE UP (ref 3.8–5.2)
RBC # FLD: 16.3 % — HIGH (ref 10.3–14.5)
SODIUM SERPL-SCNC: 143 MMOL/L — SIGNIFICANT CHANGE UP (ref 135–145)
TROPONIN I SERPL-MCNC: 0.05 NG/ML — HIGH (ref 0.01–0.04)
WBC # BLD: 7.93 K/UL — SIGNIFICANT CHANGE UP (ref 3.8–10.5)
WBC # FLD AUTO: 7.93 K/UL — SIGNIFICANT CHANGE UP (ref 3.8–10.5)

## 2020-08-10 PROCEDURE — 80053 COMPREHEN METABOLIC PANEL: CPT

## 2020-08-10 PROCEDURE — 93005 ELECTROCARDIOGRAM TRACING: CPT

## 2020-08-10 PROCEDURE — 84436 ASSAY OF TOTAL THYROXINE: CPT

## 2020-08-10 PROCEDURE — 84100 ASSAY OF PHOSPHORUS: CPT

## 2020-08-10 PROCEDURE — 73030 X-RAY EXAM OF SHOULDER: CPT | Mod: RT

## 2020-08-10 PROCEDURE — 36415 COLL VENOUS BLD VENIPUNCTURE: CPT

## 2020-08-10 PROCEDURE — U0003: CPT

## 2020-08-10 PROCEDURE — 99223 1ST HOSP IP/OBS HIGH 75: CPT

## 2020-08-10 PROCEDURE — 99223 1ST HOSP IP/OBS HIGH 75: CPT | Mod: AI

## 2020-08-10 PROCEDURE — 97161 PT EVAL LOW COMPLEX 20 MIN: CPT | Mod: GP

## 2020-08-10 PROCEDURE — 72131 CT LUMBAR SPINE W/O DYE: CPT

## 2020-08-10 PROCEDURE — 84484 ASSAY OF TROPONIN QUANT: CPT

## 2020-08-10 PROCEDURE — 73221 MRI JOINT UPR EXTREM W/O DYE: CPT | Mod: RT

## 2020-08-10 PROCEDURE — 83735 ASSAY OF MAGNESIUM: CPT

## 2020-08-10 PROCEDURE — 86769 SARS-COV-2 COVID-19 ANTIBODY: CPT

## 2020-08-10 PROCEDURE — 71045 X-RAY EXAM CHEST 1 VIEW: CPT | Mod: 26

## 2020-08-10 PROCEDURE — 97116 GAIT TRAINING THERAPY: CPT | Mod: GP

## 2020-08-10 PROCEDURE — 80048 BASIC METABOLIC PNL TOTAL CA: CPT

## 2020-08-10 PROCEDURE — 84443 ASSAY THYROID STIM HORMONE: CPT

## 2020-08-10 PROCEDURE — 80162 ASSAY OF DIGOXIN TOTAL: CPT

## 2020-08-10 PROCEDURE — 72131 CT LUMBAR SPINE W/O DYE: CPT | Mod: 26

## 2020-08-10 PROCEDURE — 85025 COMPLETE CBC W/AUTO DIFF WBC: CPT

## 2020-08-10 PROCEDURE — 84480 ASSAY TRIIODOTHYRONINE (T3): CPT

## 2020-08-10 PROCEDURE — 87086 URINE CULTURE/COLONY COUNT: CPT

## 2020-08-10 PROCEDURE — 93010 ELECTROCARDIOGRAM REPORT: CPT

## 2020-08-10 PROCEDURE — 93306 TTE W/DOPPLER COMPLETE: CPT

## 2020-08-10 PROCEDURE — 81001 URINALYSIS AUTO W/SCOPE: CPT

## 2020-08-10 PROCEDURE — 82550 ASSAY OF CK (CPK): CPT

## 2020-08-10 RX ORDER — METOPROLOL TARTRATE 50 MG
5 TABLET ORAL ONCE
Refills: 0 | Status: COMPLETED | OUTPATIENT
Start: 2020-08-10 | End: 2020-08-10

## 2020-08-10 RX ORDER — TRAMADOL HYDROCHLORIDE 50 MG/1
25 TABLET ORAL ONCE
Refills: 0 | Status: DISCONTINUED | OUTPATIENT
Start: 2020-08-10 | End: 2020-08-10

## 2020-08-10 RX ORDER — ASPIRIN/CALCIUM CARB/MAGNESIUM 324 MG
81 TABLET ORAL DAILY
Refills: 0 | Status: DISCONTINUED | OUTPATIENT
Start: 2020-08-10 | End: 2020-08-14

## 2020-08-10 RX ORDER — SODIUM CHLORIDE 9 MG/ML
1000 INJECTION INTRAMUSCULAR; INTRAVENOUS; SUBCUTANEOUS ONCE
Refills: 0 | Status: COMPLETED | OUTPATIENT
Start: 2020-08-10 | End: 2020-08-10

## 2020-08-10 RX ORDER — TRAMADOL HYDROCHLORIDE 50 MG/1
1 TABLET ORAL
Qty: 0 | Refills: 0 | DISCHARGE

## 2020-08-10 RX ORDER — FOLIC ACID 0.8 MG
3 TABLET ORAL
Qty: 0 | Refills: 0 | DISCHARGE

## 2020-08-10 RX ORDER — ACETAMINOPHEN 500 MG
1000 TABLET ORAL ONCE
Refills: 0 | Status: DISCONTINUED | OUTPATIENT
Start: 2020-08-10 | End: 2020-08-11

## 2020-08-10 RX ORDER — LEVOTHYROXINE SODIUM 125 MCG
25 TABLET ORAL DAILY
Refills: 0 | Status: DISCONTINUED | OUTPATIENT
Start: 2020-08-10 | End: 2020-08-14

## 2020-08-10 RX ORDER — ENOXAPARIN SODIUM 100 MG/ML
40 INJECTION SUBCUTANEOUS DAILY
Refills: 0 | Status: DISCONTINUED | OUTPATIENT
Start: 2020-08-10 | End: 2020-08-14

## 2020-08-10 RX ORDER — FOLIC ACID 0.8 MG
2 TABLET ORAL
Qty: 0 | Refills: 0 | DISCHARGE

## 2020-08-10 RX ORDER — PREGABALIN 225 MG/1
1 CAPSULE ORAL
Qty: 0 | Refills: 0 | DISCHARGE

## 2020-08-10 RX ORDER — LEVOTHYROXINE SODIUM 125 MCG
1 TABLET ORAL
Qty: 0 | Refills: 0 | DISCHARGE

## 2020-08-10 RX ORDER — ASPIRIN/CALCIUM CARB/MAGNESIUM 324 MG
1 TABLET ORAL
Qty: 0 | Refills: 0 | DISCHARGE

## 2020-08-10 RX ORDER — CHOLECALCIFEROL (VITAMIN D3) 125 MCG
1 CAPSULE ORAL
Qty: 0 | Refills: 0 | DISCHARGE

## 2020-08-10 RX ORDER — METHOTREXATE 2.5 MG/1
4 TABLET ORAL
Qty: 0 | Refills: 0 | DISCHARGE

## 2020-08-10 RX ORDER — DILTIAZEM HCL 120 MG
5 CAPSULE, EXT RELEASE 24 HR ORAL
Qty: 125 | Refills: 0 | Status: DISCONTINUED | OUTPATIENT
Start: 2020-08-10 | End: 2020-08-11

## 2020-08-10 RX ORDER — METHOTREXATE 2.5 MG/1
8 TABLET ORAL
Qty: 0 | Refills: 0 | DISCHARGE

## 2020-08-10 RX ORDER — ACETAMINOPHEN 500 MG
650 TABLET ORAL EVERY 6 HOURS
Refills: 0 | Status: DISCONTINUED | OUTPATIENT
Start: 2020-08-10 | End: 2020-08-14

## 2020-08-10 RX ORDER — LIDOCAINE 4 G/100G
1 CREAM TOPICAL EVERY 24 HOURS
Refills: 0 | Status: DISCONTINUED | OUTPATIENT
Start: 2020-08-10 | End: 2020-08-14

## 2020-08-10 RX ORDER — FOLIC ACID 0.8 MG
1 TABLET ORAL DAILY
Refills: 0 | Status: DISCONTINUED | OUTPATIENT
Start: 2020-08-10 | End: 2020-08-14

## 2020-08-10 RX ORDER — CHOLECALCIFEROL (VITAMIN D3) 125 MCG
400 CAPSULE ORAL DAILY
Refills: 0 | Status: DISCONTINUED | OUTPATIENT
Start: 2020-08-10 | End: 2020-08-14

## 2020-08-10 RX ORDER — DIGOXIN 250 MCG
0.12 TABLET ORAL DAILY
Refills: 0 | Status: DISCONTINUED | OUTPATIENT
Start: 2020-08-10 | End: 2020-08-14

## 2020-08-10 RX ADMIN — SODIUM CHLORIDE 1000 MILLILITER(S): 9 INJECTION INTRAMUSCULAR; INTRAVENOUS; SUBCUTANEOUS at 15:45

## 2020-08-10 RX ADMIN — TRAMADOL HYDROCHLORIDE 25 MILLIGRAM(S): 50 TABLET ORAL at 16:40

## 2020-08-10 RX ADMIN — Medication 5 MG/HR: at 16:41

## 2020-08-10 RX ADMIN — Medication 5 MILLIGRAM(S): at 15:22

## 2020-08-10 RX ADMIN — LIDOCAINE 1 PATCH: 4 CREAM TOPICAL at 21:48

## 2020-08-10 NOTE — H&P ADULT - NSHPSOCIALHISTORY_GEN_ALL_CORE
denies smoking alcohol, recreational drugs denies smoking alcohol, recreational drugs     patient lives with her daughter, who is 68 years ago. she primarly helps her daughter with her adls. patient otherwise lives alone, walks without a walker in the day, has a walker if needed.    patient very anxious about her daughter and needs to be home to care for her

## 2020-08-10 NOTE — CONSULT NOTE ADULT - ASSESSMENT
89 yo female with HTN, PAF not on oac due to h/o ICH, CHF, RA admitted with anxiety and afib with RVR.   on home on coreg 3.125mg bid and aspirin 81mg.  she had prior evaluation with Dr Aponte for sinus bradycardia in 40s    PAF with RVR:  since she is not on anticoagulation will pursue rate control strategy  will start Cardizem gtt at 5mg/hr (hold for SBP <85bpm)  cont coreg 3.125 bid  will add digoxin 125mcg  cont tele monitoring   EP will follow 89 yo female with HTN, PAF not on oac due to h/o ICH, CHF, RA admitted with anxiety and afib with RVR.   on home on coreg 3.125mg bid and aspirin 81mg.  she had prior evaluation with Dr Aponte for sinus bradycardia in 40s    PAF with RVR:  since she is not on anticoagulation will pursue rate control strategy  will start Cardizem gtt at 5mg/hr (hold for SBP <85bpm)  stop coreg  will add digoxin 125mcg  cont tele monitoring   EP will follow 89 yo female with HTN, PAF not on oac due to h/o ICH, CHF, RA admitted with anxiety and afib with RVR.   on home on coreg 3.125mg bid and aspirin 81mg.  she had prior evaluation with Dr Aponte for sinus bradycardia in 40s    SSS and PAF with RVR:  since she is not on anticoagulation will pursue rate control strategy  will start Cardizem gtt at 5mg/hr (hold for SBP <85bpm)  stop coreg  will add digoxin 125mcg  cont tele monitoring   EP will follow

## 2020-08-10 NOTE — ED ADULT NURSE NOTE - NSIMPLEMENTINTERV_GEN_ALL_ED
Implemented All Universal Safety Interventions:  [place] to call system. Call bell, personal items and telephone within reach. Instruct patient to call for assistance. Room bathroom lighting operational. Non-slip footwear when patient is off stretcher. Physically safe environment: no spills, clutter or unnecessary equipment. Stretcher in lowest position, wheels locked, appropriate side rails in place.

## 2020-08-10 NOTE — H&P ADULT - NSHPPHYSICALEXAM_GEN_ALL_CORE
Objective:    Vitals:  T(C): 36.6 (08-10-20 @ 13:37), Max: 36.6 (08-10-20 @ 13:37)  HR: 117 (08-10-20 @ 18:00) (104 - 141)  BP: 111/72 (08-10-20 @ 18:00) (78/52 - 116/64)  RR: 18 (08-10-20 @ 18:00) (18 - 24)  SpO2: 97% (08-10-20 @ 18:00) (95% - 98%)    Physical Exam:  General: comfortable, no acute distress, well nourished  HEENT: Atraumatic, no LAD, trachea midline, PERRLA  Cardiovascular: normal s1s2, no murmurs, gallops or fricition rubs  Pulmonary: clear to ausculation Bilaterally, no wheezing , rhonchi  Gastrointestinal: soft non tender non distended, no masses felt, no organomegally  Muscloskeletal: no lower extremity edema, intact bilateral lower extremity pulses  Neurological: CN II-12 intact. No focal weakness  Psychiatrical: normal mood, cooperative  SKIN: no rash, lesions or ulcers Objective:    Vitals:  T(C): 36.6 (08-10-20 @ 13:37), Max: 36.6 (08-10-20 @ 13:37)  HR: 117 (08-10-20 @ 18:00) (104 - 141)  BP: 111/72 (08-10-20 @ 18:00) (78/52 - 116/64)  RR: 18 (08-10-20 @ 18:00) (18 - 24)  SpO2: 97% (08-10-20 @ 18:00) (95% - 98%)    Physical Exam:  General: comfortable, no acute distress, well nourished, somewhat anxious  HEENT: Atraumatic, no LAD, trachea midline, PERRLA  Cardiovascular: normal s1s2, no murmurs, gallops or fricition rubs  Pulmonary: clear to ausculation Bilaterally, no wheezing , rhonchi  Gastrointestinal: soft non tender non distended, no masses felt, no organomegally  Muscloskeletal: no lower extremity edema, intact bilateral lower extremity pulses.. ++varicose veins..   upper extremity:  right shoulder: abduction very limited to due to pain. anterior deltoid tenderness on deep palpation.  left shoulder: similar exam: limited abduction  lower extremity: bony tenderness on shin bilateral  back: right sided radicular tenderness, lower. Negative for spinal tenderness  Neurological: CN II-12 intact. much of exam limited due to pain  Psychiatrical: normal mood, cooperative  SKIN: no rash, lesions or ulcers

## 2020-08-10 NOTE — ED PROVIDER NOTE - OBJECTIVE STATEMENT
89 y/o female with a PMHx of anemia, Afib, b/l hearing loss, breast cancer, CHF, goiter, HTN, hypothyroid, ICH, mass on colon, melanoma, RA, SVT, varicose veins, presents to the ED BIBEMS from home c/o anxiety. Pt reports she checked her HR this morning, in the low 50. Pt checked her HR again, was in 140s and she became anxious. Pt took extra Metoprolol. States she feels increasingly depressed recently. Denies SI/HI. Pt notes she thinks her RA is acting up as she is having joint pain. No other complaints at this time.

## 2020-08-10 NOTE — H&P ADULT - NSHPOUTPATIENTPROVIDERS_GEN_ALL_CORE
Toya Greenfield Toya Greenfield PCP  Dr. Douglass Rheumatologist  [place]  Cardiologist : Dr Simpson

## 2020-08-10 NOTE — H&P ADULT - HISTORY OF PRESENT ILLNESS
89 y/o female with a PMHx of anemia, Afib, b/l hearing loss, breast cancer, CHF, goiter, HTN, hypothyroid, ICH, mass on colon, melanoma, RA, SVT, varicose veins, presents to the ED BIBEMS from home with C/C of shoulder pain and evaluation of low heart rate.  Patient reports she was in her USOH. Performed routine vitals on herself,and found her heart rate was 50.. Pt checked her HR again, was in 140s and she became anxious. Pt took extra Metoprolol.    on ROS: Patient does reports she feels her R.A is getting worse due to worsening Joint pain.denies fever chills chest pain, shortness of breath.    ER course: On initial presentation, patient was normotensive, tachycardic . non hypoxemkc  Imaging: cxr: negative for acute pulm process  Labs: Hb benign at 12.7. Metabolic panel benign. Coags benign  EP was consulted, patient was started on cardizem drip 89 y/o female with a PMHx of anemia, Afib, b/l hearing loss, breast cancer, CHF, goiter, HTN, hypothyroid, ICH, mass on colon, melanoma, RA, SVT, varicose veins, presents to the ED BIBEMS from home with C/C of generalized weakness and diffuse body aches. history obtained by Patient. As per patient, symptoms started 3 days ago. Patient reports she started to fell very tired. Denies fever chills.Patient reports she has severe Rheumatoid Arthritis, wherein she has chronic back pain and LEFT shoulder pain, however this pain is different. she reports new RIGHT sided shoulder pain, wherein she cannot  lift her shoulder.  She is on methotrexate on the weekends, steroids and folic acid.shoulder pain and evaluation of low heart rate.    This morning, performed routine vitals on herself,and found her heart rate was 50.. Pt checked her HR again, was in 140s and she became anxious. Pt took extra Metoprolol.    Baseline social hx: patient lives with her daughter, who is 68 years ago. she primarly helps her daughter with her adls. patient otherwise lives alone, walks without a walker in the day, has a walker if needed.      ER course: On initial presentation, patient was normotensive, tachycardic . non hypoxemic  Imaging: cxr: negative for acute pulm process  Labs: Hb benign at 12.7. Metabolic panel benign. Coags benign  EP was consulted, patient was started on cardizem drip 89 y/o female with a PMHx of anemia, Afib, b/l hearing loss, breast cancer, CHF (takes lasix PRN for ankle swelling), goiter, HTN, hypothyroid, ICH, mass on colon, melanoma, RA, SVT, varicose veins, presents to the ED BIBEMS from home with C/C of generalized weakness and diffuse body aches. history obtained by Patient. As per patient, symptoms started 3 days ago. Patient reports she started to fell very tired. Denies fever chills.Patient reports she has severe Rheumatoid Arthritis, wherein she has chronic back pain and LEFT shoulder pain, however this pain is different. she reports new RIGHT sided shoulder pain, wherein she cannot  lift her shoulder.  She is on methotrexate on the weekends, steroids and folic acid.shoulder pain and evaluation of low heart rate.    This morning, performed routine vitals on herself,and found her heart rate was 50.. Pt checked her HR again, was in 140s and she became anxious. Pt took extra Metoprolol.    Baseline social hx: patient lives with her daughter, who is 68 years ago. she primarly helps her daughter with her adls. patient otherwise lives alone, walks without a walker in the day, has a walker if needed.      ER course: On initial presentation, patient was normotensive, tachycardic . non hypoxemic  Imaging: cxr: negative for acute pulm process  Labs: Hb benign at 12.7. Metabolic panel benign. Coags benign  EP was consulted, patient was started on cardizem drip

## 2020-08-10 NOTE — ED PROVIDER NOTE - PSYCHIATRIC, MLM
Alert and oriented to person, place, time/situation. Flat affect. no apparent risk to self or others.

## 2020-08-10 NOTE — H&P ADULT - ASSESSMENT
89 yo female with HTN, PAF not on ac due to h/o ICH, CHF, RA sent herself to ED for worsening shoudler pain, now admitted for AFib with RVR.     Left Shoulder Pain r/o rotator cuff tear  MRI L shoulder non con  Pain management  Ortho consult based on MR findings    SSS and PAF with RVR:  Patient not on A/C due to hx of ICH  appreciate EP recs  will start Cardizem gtt at 5mg/hr (hold for SBP <85bpm)  stop coreg  c/w asa  will add digoxin 125mcg  cont tele monitoring     hx of R./A  continue pred 5    hx of anemia  c/w folic acid    hx of Hypothyroidism  c/w synthroid    dvt ppx lovenox 89 yo female with HTN, PAF not on ac due to h/o ICH, CHF, RA sent herself to ED for worsening shoudler pain, now admitted for AFib with RVR.     Diffuse Body Pain ++ Generalized Weakness  Lumbar Radiculopathy   Bony tenderness r/o osteoporosis  Right Shoulder Pain r/o rotator cuff tear  -MRI R shoulder non con  -Ct L spine non con  -Pain management with lidcaine patch and tylenol  -Ortho consult based on MR and CT findings  -PT consult    SSS and PAF with RVR:  Patient not on A/C due to hx of ICH  appreciate EP recs  will start Cardizem gtt at 5mg/hr (hold for SBP <85bpm)  stop coreg  c/w asa  will add digoxin 125mcg  cont tele monitoring     hx of R./A  continue pred 5    hx of anemia  c/w folic acid    hx of Hypothyroidism  c/w synthroid    dvt ppx lovenox 89 yo female with HTN, PAF not on ac due to h/o ICH, CHF, RA sent herself to ED for worsening shoudler pain, now admitted for AFib with RVR.     Diffuse Body Pain ++ Generalized Weakness  Lumbar Radiculopathy   Bony tenderness r/o osteoporosis  Right Shoulder Pain r/o rotator cuff tear  -MRI R shoulder non con  -Ct L spine non con  -Pain management with lidcaine patch and tylenol  -Ortho consult based on MR and CT findings  -PT consult  -CPK to r/o rhabdo    SSS and PAF with RVR:  Patient not on A/C due to hx of ICH  appreciate EP recs  AF likelly due to pain although will need to r/o occult cause  will start Cardizem gtt at 5mg/hr (hold for SBP <85bpm)  stop coreg  c/w asa  digoxin 125mcg  cont tele monitoring   check urinalysis and urine culture    hx of R./A  continue pred 5  c/w folic acid  c/w vitamin D    hx of Hypothyroidism  c/w synthroid    dvt ppx lovenox 89 yo female with HTN, PAF not on ac due to h/o ICH, CHF, RA sent herself to ED for worsening shoudler pain, now admitted for AFib with RVR.     Diffuse Body Pain ++ Generalized Weakness  Lumbar Radiculopathy   Bony tenderness r/o osteoporosis  Right Shoulder Pain r/o rotator cuff tear  -MRI R shoulder non con  -Ct L spine non con  -Pain management with lidcaine patch and tylenol  -Ortho consult based on MR and CT findings  -PT consult  -CPK to r/o rhabdo    SSS and PAF with RVR:  Patient not on A/C due to hx of ICH  appreciate EP recs  AF likelly due to pain although will need to r/o occult cause  will start Cardizem gtt at 5mg/hr (hold for SBP <85bpm)  c/w asa  digoxin 125mcg  cont tele monitoring   check urinalysis and urine culture    hx of R./A  continue pred 5  c/w folic acid  c/w vitamin D    hx of Hypothyroidism  c/w synthroid    dvt ppx lovenox 89 yo female with HTN, PAF not on ac due to h/o ICH, CHF, RA sent herself to ED for worsening shoudler pain, now admitted for AFib with RVR.     Diffuse Body Pain ++ Generalized Weakness  Lumbar Radiculopathy   Bony tenderness r/o osteoporosis  Right Shoulder Pain r/o rotator cuff tear  -MRI R shoulder non con  -Ct L spine non con  -Pain management with lidcaine patch and tylenol  -Ortho consult based on MR and CT findings  -PT consult  -CPK to r/o rhabdo    SSS and PAF with RVR:  Patient not on A/C due to hx of ICH  appreciate EP recs  AF likelly due to pain although will need to r/o occult cause  will start Cardizem gtt at 5mg/hr (hold for SBP <85bpm)  c/w asa  digoxin 125mcg  cont tele monitoring   check urinalysis and urine culture  echo    hx of CHF  Patient on prn lasix  euvolemic now  hold lasix    hx of R./A  continue pred 5  c/w folic acid  c/w vitamin D    hx of Hypothyroidism  c/w synthroid    dvt ppx lovenox

## 2020-08-10 NOTE — ED PROVIDER NOTE - PMH
Adhesive capsulitis of left shoulder    Afib    Bilateral hearing loss, unspecified hearing loss type    Breast cancer  s/p lumpectomy  CHF (congestive heart failure)    Dry eyes, bilateral    Goiter    History of anemia    History of anemia    HTN (hypertension)    Hypothyroid    ICH (intracerebral hemorrhage)  2014: Followed by Dr. Cesar Leigh of colon  Followed by Dr. Bang  Melanoma of eye, right  Followed in [facility] by Opthamologist  Rheumatoid arthritis    SVT (supraventricular tachycardia)    Varicose veins

## 2020-08-10 NOTE — CONSULT NOTE ADULT - SUBJECTIVE AND OBJECTIVE BOX
HPI: 89 yo female with PMH HTN, CHF, afib not on oac due to h/o ICH, melanoma, Rheumatoid Arthritis, hypothyroidism,  breast cancer, presented to  ED with anxiety, rapid heart rate and shoulder pain. in ED noted in rapid afib with rate to 140s, received one dose of lopressor and became hypotensive SBP 78bpm, started on gentle IVF and BP improved to 115/78.   on my evaluation she is sitting up in bed, denies any complaints of chest pain, sob, dizziness or palpitations, complains of severe pain to left shoulder and arm that is chronic but is worse today. pt's mood is very labile crying from pain asking for tramadol.     PAST MEDICAL & SURGICAL HISTORY:  Bilateral hearing loss, unspecified hearing loss type  History of anemia  Dry eyes, bilateral  SVT (supraventricular tachycardia)  Adhesive capsulitis of left shoulder  Mass of colon: Followed by Dr. Bang  Varicose veins  History of anemia  Melanoma of eye, right: Followed in [facility] by Opthamologist  Breast cancer: s/p lumpectomy  Goiter  HTN (hypertension)  Rheumatoid arthritis  Hypothyroid  ICH (intracerebral hemorrhage): : Followed by Dr. Cesar Stearns  CHF (congestive heart failure)  S/P breast lumpectomy: Left   S/P colonoscopy: unsure of all dates; last one   S/P vein strippin  S/P cataract extraction: B/L;   History of appendectomy  H/O umbilical hernia repair  H/O bilateral hip replacements      MEDICATIONS  (STANDING):  diltiazem Infusion 5 mG/Hr (5 mL/Hr) IV Continuous <Continuous>  traMADol 25 milliGRAM(s) Oral once    MEDICATIONS  (PRN):      Allergies    penicillins (Pruritus)  sulfa drugs (Unknown)      SOCIAL HISTORY: lives at home, denies smoking drinking or illicit drug use.     FAMILY HISTORY:  Family history of uterine cancer (Sibling)  Family history of diabetes mellitus  Family history of heart disease  Family history of emphysema: Father      Vital Signs Last 24 Hrs  T(C): 36.6 (10 Aug 2020 13:37), Max: 36.6 (10 Aug 2020 13:37)  T(F): 97.9 (10 Aug 2020 13:37), Max: 97.9 (10 Aug 2020 13:37)  HR: 141 (10 Aug 2020 15:37) (104 - 141)  BP: 94/69 (10 Aug 2020 15:37) (78/52 - 116/64)  BP(mean): --  RR: 22 (10 Aug 2020 15:37) (18 - 24)  SpO2: 96% (10 Aug 2020 15:37) (95% - 98%)      CONSTITUTIONAL: No fever, weight loss, chills, shakes, or fatigue  EYES: No eye pain, visual disturbances, or discharge  ENMT:  No difficulty hearing, tinnitus, vertigo; No sinus or throat pain  RESPIRATORY: No cough, wheezing, hemoptysis, or shortness of breath  CARDIOVASCULAR: No chest pain, dyspnea, palpitations, dizziness, syncope  GASTROINTESTINAL: No abdominal  pain, nausea, vomiting, diarrhea, constipation, melena or bright red blood.  GENITOURINARY: No dysuria, nocturia, hematuria, or urinary incontinence  NEUROLOGICAL: No headaches, memory loss, slurred speech, limb weakness, loss of strength, numbness, or tremors  ENDOCRINE: No heat or cold intolerance, or hair loss  MUSCULOSKELETAL: left shoulder pain  PSYCHIATRIC: No depression, anxiety, or difficulty sleeping  HEME/LYMPH: No easy bruising or bleeding gums  ALLERY AND IMMUNOLOGIC: No hives or rash.    PHYSICAL EXAMINATION:    Constitutional: NAD, awake and alert, well-developed  HEENT: PERR, EOMI,  No oral cyananosis.  Neck:  supple,  No JVD  Respiratory: Breath sounds are clear bilaterally, No wheezing, rales or rhonchi  Cardiovascular: S1 and S2, regular rate and rhythm, no Murmurs, gallops or rubs  Gastrointestinal: Bowel Sounds present, soft, nontender.   Extremities: No peripheral edema. No clubbing or cyanosis.  Vascular: 2+ peripheral pulses  Neurological: A/O x 3, no focal deficits  Musculoskeletal: no calf tenderness.  Skin: No rashes.      LABS:                        12.7   7.93  )-----------( 273      ( 10 Aug 2020 14:35 )             39.5   08-10    143  |  112<H>  |  12  ----------------------------<  115<H>  4.8   |  24  |  0.72    Ca    8.6      10 Aug 2020 14:35    TPro  7.0  /  Alb  3.3  /  TBili  1.4<H>  /  DBili  x   /  AST  28  /  ALT  26  /  AlkPhos  53  08-10  LIVER FUNCTIONS - ( 10 Aug 2020 14:35 )  Alb: 3.3 g/dL / Pro: 7.0 gm/dL / ALK PHOS: 53 U/L / ALT: 26 U/L / AST: 28 U/L / GGT: x           PT/INR - ( 10 Aug 2020 15:28 )   PT: 12.2 sec;   INR: 1.05 ratio         PTT - ( 10 Aug 2020 15:28 )  PTT:29.3 secCARDIAC MARKERS ( 10 Aug 2020 14:35 )  <0.015 ng/mL / x     / x     / x     / x              EK/10/20 afib rate 145bpm QRS 68ms, QTc 459ms    TELEMETRY: afib rates 110-140bpm    CARDIAC TESTS:  TTE 20     Summary     Left ventricle endocardium was well visualized with preserved systolic   function. Left ventricle size and structure are within normal limitations.   Estimated ejection fraction is 65 % via bi-plane method.   aortic valve is mildly sclerotic   EA reversal of the mitral inflow consistent with reduced compliance of the   left ventricle   Mild (1+) tricuspid valve regurgitation is present.   Mild pulmonic regurgitation noted.     Signature     ----------------------------------------------------------------   Electronically signed by Wilmer Lyles MD(Interpreting   physician) on 2020 04:13 PM HPI: 89 yo female with PMH HTN, CHF, afib not on oac due to h/o ICH, melanoma, Rheumatoid Arthritis, hypothyroidism,  breast cancer, presented to  ED with anxiety, rapid heart rate and shoulder pain. Pt noted HR at home varying from 40 bpm to 120 bpm and decided to come to hospital. In ED noted in rapid afib with rate to 140s, received one dose of lopressor and became hypotensive SBP 78bpm, started on gentle IVF and BP improved to 115/78.   on my evaluation she is sitting up in bed, denies any complaints of chest pain, sob, dizziness or palpitations, complains of severe pain to left shoulder and arm that is chronic but is worse today. pt's mood is very labile crying from pain asking for tramadol.     PAST MEDICAL & SURGICAL HISTORY:  Bilateral hearing loss, unspecified hearing loss type  History of anemia  Dry eyes, bilateral  SVT (supraventricular tachycardia)  Adhesive capsulitis of left shoulder  Mass of colon: Followed by Dr. Bang  Varicose veins  History of anemia  Melanoma of eye, right: Followed in [facility] by Opthamologist  Breast cancer: s/p lumpectomy  Goiter  HTN (hypertension)  Rheumatoid arthritis  Hypothyroid  ICH (intracerebral hemorrhage): 2014: Followed by Dr. Cesar Stearns  CHF (congestive heart failure)  S/P breast lumpectomy: Left   S/P colonoscopy: unsure of all dates; last one   S/P vein strippin  S/P cataract extraction: B/L;   History of appendectomy  H/O umbilical hernia repair  H/O bilateral hip replacements      MEDICATIONS  (STANDING):  diltiazem Infusion 5 mG/Hr (5 mL/Hr) IV Continuous <Continuous>  traMADol 25 milliGRAM(s) Oral once    MEDICATIONS  (PRN):      Allergies    penicillins (Pruritus)  sulfa drugs (Unknown)      SOCIAL HISTORY: lives at home, denies smoking drinking or illicit drug use.     FAMILY HISTORY:  Family history of uterine cancer (Sibling)  Family history of diabetes mellitus  Family history of heart disease  Family history of emphysema: Father      Vital Signs Last 24 Hrs  T(C): 36.6 (10 Aug 2020 13:37), Max: 36.6 (10 Aug 2020 13:37)  T(F): 97.9 (10 Aug 2020 13:37), Max: 97.9 (10 Aug 2020 13:37)  HR: 141 (10 Aug 2020 15:37) (104 - 141)  BP: 94/69 (10 Aug 2020 15:37) (78/52 - 116/64)  BP(mean): --  RR: 22 (10 Aug 2020 15:37) (18 - 24)  SpO2: 96% (10 Aug 2020 15:37) (95% - 98%)      CONSTITUTIONAL: No fever, weight loss, chills, shakes, or fatigue  EYES: No eye pain, visual disturbances, or discharge  ENMT:  No difficulty hearing, tinnitus, vertigo; No sinus or throat pain  RESPIRATORY: No cough, wheezing, hemoptysis, or shortness of breath  CARDIOVASCULAR: No chest pain, dyspnea, palpitations, dizziness, syncope  GASTROINTESTINAL: No abdominal  pain, nausea, vomiting, diarrhea, constipation, melena or bright red blood.  GENITOURINARY: No dysuria, nocturia, hematuria, or urinary incontinence  NEUROLOGICAL: No headaches, memory loss, slurred speech, limb weakness, loss of strength, numbness, or tremors  ENDOCRINE: No heat or cold intolerance, or hair loss  MUSCULOSKELETAL: left shoulder pain  PSYCHIATRIC: No depression, anxiety, or difficulty sleeping  HEME/LYMPH: No easy bruising or bleeding gums  ALLERY AND IMMUNOLOGIC: No hives or rash.    PHYSICAL EXAMINATION:    Constitutional: NAD, awake and alert, well-developed  HEENT: PERR, EOMI,  No oral cyananosis.  Neck:  supple,  No JVD  Respiratory: Breath sounds are clear bilaterally, No wheezing, rales or rhonchi  Cardiovascular: S1 and S2, regular rate and rhythm, no Murmurs, gallops or rubs  Gastrointestinal: Bowel Sounds present, soft, nontender.   Extremities: No peripheral edema. No clubbing or cyanosis.  Vascular: 2+ peripheral pulses  Neurological: A/O x 3, no focal deficits  Musculoskeletal: no calf tenderness.  Skin: No rashes.      LABS:                        12.7   7.93  )-----------( 273      ( 10 Aug 2020 14:35 )             39.5   08-10    143  |  112<H>  |  12  ----------------------------<  115<H>  4.8   |  24  |  0.72    Ca    8.6      10 Aug 2020 14:35    TPro  7.0  /  Alb  3.3  /  TBili  1.4<H>  /  DBili  x   /  AST  28  /  ALT  26  /  AlkPhos  53  08-10  LIVER FUNCTIONS - ( 10 Aug 2020 14:35 )  Alb: 3.3 g/dL / Pro: 7.0 gm/dL / ALK PHOS: 53 U/L / ALT: 26 U/L / AST: 28 U/L / GGT: x           PT/INR - ( 10 Aug 2020 15:28 )   PT: 12.2 sec;   INR: 1.05 ratio         PTT - ( 10 Aug 2020 15:28 )  PTT:29.3 secCARDIAC MARKERS ( 10 Aug 2020 14:35 )  <0.015 ng/mL / x     / x     / x     / x              EK/10/20 afib rate 145bpm QRS 68ms, QTc 459ms    TELEMETRY: afib rates 110-140bpm    CARDIAC TESTS:  TTE 20     Summary     Left ventricle endocardium was well visualized with preserved systolic   function. Left ventricle size and structure are within normal limitations.   Estimated ejection fraction is 65 % via bi-plane method.   aortic valve is mildly sclerotic   EA reversal of the mitral inflow consistent with reduced compliance of the   left ventricle   Mild (1+) tricuspid valve regurgitation is present.   Mild pulmonic regurgitation noted.     Signature     ----------------------------------------------------------------   Electronically signed by Wilmer Lyles MD(Interpreting   physician) on 2020 04:13 PM

## 2020-08-10 NOTE — H&P ADULT - NSHPLABSRESULTS_GEN_ALL_CORE
Labs:                          12.7   7.93  )-----------( 273      ( 10 Aug 2020 14:35 )             39.5     08-10    143  |  112<H>  |  12  ----------------------------<  115<H>  4.8   |  24  |  0.72    Ca    8.6      10 Aug 2020 14:35    TPro  7.0  /  Alb  3.3  /  TBili  1.4<H>  /  DBili  x   /  AST  28  /  ALT  26  /  AlkPhos  53  08-10    LIVER FUNCTIONS - ( 10 Aug 2020 14:35 )  Alb: 3.3 g/dL / Pro: 7.0 gm/dL / ALK PHOS: 53 U/L / ALT: 26 U/L / AST: 28 U/L / GGT: x           PT/INR - ( 10 Aug 2020 15:28 )   PT: 12.2 sec;   INR: 1.05 ratio         PTT - ( 10 Aug 2020 15:28 )  PTT:29.3 sec      Active Medications  MEDICATIONS  (STANDING):  digoxin     Tablet 0.125 milliGRAM(s) Oral daily  diltiazem Infusion 5 mG/Hr (5 mL/Hr) IV Continuous <Continuous>  enoxaparin Injectable 40 milliGRAM(s) SubCutaneous daily    MEDICATIONS  (PRN):

## 2020-08-10 NOTE — ED ADULT NURSE NOTE - OBJECTIVE STATEMENT
Patient presents with ambulance from home, patient states she checked her heart rate at home and saw 140 and became anxious. Patient took extra metoprolol at home but still feels her heart racing. Denies shortness of breath. Reports she has joint pain

## 2020-08-10 NOTE — H&P ADULT - NSHPREVIEWOFSYSTEMS_GEN_ALL_CORE
Review of Systems:  General:denies fever chills, headache, weakness  HEENT: denies blurry vision,diffculty swallowing, difficulty hearing, tinnitus  Cardiovascular: denies chest pain  ,palpitations  Pulmonary:denies shortness of breath, cough, wheezing, hemoptysis  Gastrointestinal: denies abdominal pain, constipation, diarrhea,nausea , vomiting, hematochezia  : denies hematuria, dysuria, or incontinence  Neurological: denies weakness, numbness , tingling, dizziness, tremors  MSK: denies muscle pain, difficulty ambulating, swelling, back pain  skin: denies skin rash, itching, burning, or  skin lesions  Psychiatrical: denies mood disturbances, anxierty, feeling depressed, depression , or difficulty sleeping Review of Systems:  General:denies fever chills, headache, +++weakness  HEENT: denies blurry vision,diffculty swallowing, difficulty hearing, tinnitus  Cardiovascular: denies chest pain  ,palpitations  Pulmonary:denies shortness of breath, cough, wheezing, hemoptysis  Gastrointestinal: denies abdominal pain, constipation, diarrhea,nausea , vomiting, hematochezia  : denies hematuria, dysuria, or incontinence  Neurological: denies weakness, numbness , tingling, dizziness, tremors  MSK: +++muscle pain, difficulty ambulating, swelling, +++back pain, ++left and right shoulder pain  skin: denies skin rash, itching, burning, or  skin lesions  Psychiatrical: denies mood disturbances, anxierty, feeling depressed, depression , or difficulty sleeping

## 2020-08-10 NOTE — ED ADULT TRIAGE NOTE - CHIEF COMPLAINT QUOTE
PT A/OX3, BIBEMS from home c/o anxiety. as per EMS "pt was sent to ED for anxiety but also has high blood pressure and was told to take an extra half of Metoprolol, pt has 37.5mg of metoporol on board". pt denies complaints/symptoms in EMS triage.

## 2020-08-10 NOTE — H&P ADULT - NSICDXPASTMEDICALHX_GEN_ALL_CORE_FT
PAST MEDICAL HISTORY:  Adhesive capsulitis of left shoulder     Afib     Bilateral hearing loss, unspecified hearing loss type     Breast cancer s/p lumpectomy    CHF (congestive heart failure)     Dry eyes, bilateral     Goiter     History of anemia     History of anemia     HTN (hypertension)     Hypothyroid     ICH (intracerebral hemorrhage) 2014: Followed by Dr. Cesar Leigh of colon Followed by Dr. Bang    Melanoma of eye, right Followed in [facility] by Opthamologist    Rheumatoid arthritis     SVT (supraventricular tachycardia)     Varicose veins

## 2020-08-11 LAB
ALBUMIN SERPL ELPH-MCNC: 3.2 G/DL — LOW (ref 3.3–5)
ALP SERPL-CCNC: 55 U/L — SIGNIFICANT CHANGE UP (ref 40–120)
ALT FLD-CCNC: 22 U/L — SIGNIFICANT CHANGE UP (ref 12–78)
ANION GAP SERPL CALC-SCNC: 8 MMOL/L — SIGNIFICANT CHANGE UP (ref 5–17)
APPEARANCE UR: CLEAR — SIGNIFICANT CHANGE UP
AST SERPL-CCNC: 21 U/L — SIGNIFICANT CHANGE UP (ref 15–37)
BASOPHILS # BLD AUTO: 0.02 K/UL — SIGNIFICANT CHANGE UP (ref 0–0.2)
BASOPHILS NFR BLD AUTO: 0.3 % — SIGNIFICANT CHANGE UP (ref 0–2)
BILIRUB SERPL-MCNC: 2 MG/DL — HIGH (ref 0.2–1.2)
BILIRUB UR-MCNC: NEGATIVE — SIGNIFICANT CHANGE UP
BUN SERPL-MCNC: 13 MG/DL — SIGNIFICANT CHANGE UP (ref 7–23)
CALCIUM SERPL-MCNC: 8.4 MG/DL — LOW (ref 8.5–10.1)
CHLORIDE SERPL-SCNC: 110 MMOL/L — HIGH (ref 96–108)
CO2 SERPL-SCNC: 23 MMOL/L — SIGNIFICANT CHANGE UP (ref 22–31)
COLOR SPEC: YELLOW — SIGNIFICANT CHANGE UP
CREAT SERPL-MCNC: 0.82 MG/DL — SIGNIFICANT CHANGE UP (ref 0.5–1.3)
DIFF PNL FLD: NEGATIVE — SIGNIFICANT CHANGE UP
DIGOXIN SERPL-MCNC: 0.27 NG/ML — LOW (ref 0.8–2)
EOSINOPHIL # BLD AUTO: 0.08 K/UL — SIGNIFICANT CHANGE UP (ref 0–0.5)
EOSINOPHIL NFR BLD AUTO: 1.1 % — SIGNIFICANT CHANGE UP (ref 0–6)
GLUCOSE SERPL-MCNC: 174 MG/DL — HIGH (ref 70–99)
GLUCOSE UR QL: NEGATIVE MG/DL — SIGNIFICANT CHANGE UP
HCT VFR BLD CALC: 41.3 % — SIGNIFICANT CHANGE UP (ref 34.5–45)
HGB BLD-MCNC: 12.8 G/DL — SIGNIFICANT CHANGE UP (ref 11.5–15.5)
IMM GRANULOCYTES NFR BLD AUTO: 0.3 % — SIGNIFICANT CHANGE UP (ref 0–1.5)
KETONES UR-MCNC: NEGATIVE — SIGNIFICANT CHANGE UP
LEUKOCYTE ESTERASE UR-ACNC: ABNORMAL
LYMPHOCYTES # BLD AUTO: 2.04 K/UL — SIGNIFICANT CHANGE UP (ref 1–3.3)
LYMPHOCYTES # BLD AUTO: 27.5 % — SIGNIFICANT CHANGE UP (ref 13–44)
MAGNESIUM SERPL-MCNC: 2.1 MG/DL — SIGNIFICANT CHANGE UP (ref 1.6–2.6)
MCHC RBC-ENTMCNC: 31 GM/DL — LOW (ref 32–36)
MCHC RBC-ENTMCNC: 33 PG — SIGNIFICANT CHANGE UP (ref 27–34)
MCV RBC AUTO: 106.4 FL — HIGH (ref 80–100)
MONOCYTES # BLD AUTO: 0.47 K/UL — SIGNIFICANT CHANGE UP (ref 0–0.9)
MONOCYTES NFR BLD AUTO: 6.3 % — SIGNIFICANT CHANGE UP (ref 2–14)
NEUTROPHILS # BLD AUTO: 4.78 K/UL — SIGNIFICANT CHANGE UP (ref 1.8–7.4)
NEUTROPHILS NFR BLD AUTO: 64.5 % — SIGNIFICANT CHANGE UP (ref 43–77)
NITRITE UR-MCNC: NEGATIVE — SIGNIFICANT CHANGE UP
PH UR: 5 — SIGNIFICANT CHANGE UP (ref 5–8)
PHOSPHATE SERPL-MCNC: 3 MG/DL — SIGNIFICANT CHANGE UP (ref 2.5–4.5)
PLATELET # BLD AUTO: 307 K/UL — SIGNIFICANT CHANGE UP (ref 150–400)
POTASSIUM SERPL-MCNC: 3.9 MMOL/L — SIGNIFICANT CHANGE UP (ref 3.5–5.3)
POTASSIUM SERPL-SCNC: 3.9 MMOL/L — SIGNIFICANT CHANGE UP (ref 3.5–5.3)
PROT SERPL-MCNC: 6.5 GM/DL — SIGNIFICANT CHANGE UP (ref 6–8.3)
PROT UR-MCNC: NEGATIVE MG/DL — SIGNIFICANT CHANGE UP
RBC # BLD: 3.88 M/UL — SIGNIFICANT CHANGE UP (ref 3.8–5.2)
RBC # FLD: 16.4 % — HIGH (ref 10.3–14.5)
SARS-COV-2 RNA SPEC QL NAA+PROBE: SIGNIFICANT CHANGE UP
SODIUM SERPL-SCNC: 141 MMOL/L — SIGNIFICANT CHANGE UP (ref 135–145)
SP GR SPEC: 1.01 — SIGNIFICANT CHANGE UP (ref 1.01–1.02)
T3 SERPL-MCNC: 87 NG/DL — SIGNIFICANT CHANGE UP (ref 80–200)
T4 AB SER-ACNC: 6.3 UG/DL — SIGNIFICANT CHANGE UP (ref 4.6–12)
TROPONIN I SERPL-MCNC: 0.11 NG/ML — HIGH (ref 0.01–0.04)
UROBILINOGEN FLD QL: NEGATIVE MG/DL — SIGNIFICANT CHANGE UP
WBC # BLD: 7.41 K/UL — SIGNIFICANT CHANGE UP (ref 3.8–10.5)
WBC # FLD AUTO: 7.41 K/UL — SIGNIFICANT CHANGE UP (ref 3.8–10.5)

## 2020-08-11 PROCEDURE — 99233 SBSQ HOSP IP/OBS HIGH 50: CPT

## 2020-08-11 PROCEDURE — 93306 TTE W/DOPPLER COMPLETE: CPT | Mod: 26

## 2020-08-11 PROCEDURE — 73221 MRI JOINT UPR EXTREM W/O DYE: CPT | Mod: 26,RT

## 2020-08-11 RX ORDER — DILTIAZEM HCL 120 MG
30 CAPSULE, EXT RELEASE 24 HR ORAL EVERY 6 HOURS
Refills: 0 | Status: DISCONTINUED | OUTPATIENT
Start: 2020-08-11 | End: 2020-08-11

## 2020-08-11 RX ORDER — ALPRAZOLAM 0.25 MG
0.25 TABLET ORAL
Refills: 0 | Status: DISCONTINUED | OUTPATIENT
Start: 2020-08-11 | End: 2020-08-14

## 2020-08-11 RX ORDER — DILTIAZEM HCL 120 MG
60 CAPSULE, EXT RELEASE 24 HR ORAL EVERY 6 HOURS
Refills: 0 | Status: DISCONTINUED | OUTPATIENT
Start: 2020-08-11 | End: 2020-08-12

## 2020-08-11 RX ORDER — TRAMADOL HYDROCHLORIDE 50 MG/1
25 TABLET ORAL EVERY 6 HOURS
Refills: 0 | Status: DISCONTINUED | OUTPATIENT
Start: 2020-08-11 | End: 2020-08-14

## 2020-08-11 RX ADMIN — Medication 0.25 MILLIGRAM(S): at 10:33

## 2020-08-11 RX ADMIN — Medication 60 MILLIGRAM(S): at 17:30

## 2020-08-11 RX ADMIN — Medication 81 MILLIGRAM(S): at 10:33

## 2020-08-11 RX ADMIN — Medication 400 UNIT(S): at 12:28

## 2020-08-11 RX ADMIN — Medication 60 MILLIGRAM(S): at 23:01

## 2020-08-11 RX ADMIN — Medication 30 MILLIGRAM(S): at 10:33

## 2020-08-11 RX ADMIN — ENOXAPARIN SODIUM 40 MILLIGRAM(S): 100 INJECTION SUBCUTANEOUS at 10:33

## 2020-08-11 RX ADMIN — Medication 5 MILLIGRAM(S): at 10:33

## 2020-08-11 RX ADMIN — Medication 1 MILLIGRAM(S): at 10:33

## 2020-08-11 RX ADMIN — ENOXAPARIN SODIUM 40 MILLIGRAM(S): 100 INJECTION SUBCUTANEOUS at 00:08

## 2020-08-11 RX ADMIN — Medication 0.12 MILLIGRAM(S): at 00:08

## 2020-08-11 RX ADMIN — LIDOCAINE 1 PATCH: 4 CREAM TOPICAL at 19:07

## 2020-08-11 RX ADMIN — Medication 25 MICROGRAM(S): at 05:29

## 2020-08-11 RX ADMIN — Medication 0.12 MILLIGRAM(S): at 10:33

## 2020-08-11 NOTE — PROGRESS NOTE ADULT - SUBJECTIVE AND OBJECTIVE BOX
HPI:   89 yo female with PMH HTN, CHF, afib not on oac due to h/o ICH, melanoma, Rheumatoid Arthritis, hypothyroidism,  breast cancer, presented to  ED with anxiety, rapid heart rate and shoulder pain. Pt noted HR at home varying from 40 bpm to 120 bpm and decided to come to hospital. In ED noted in rapid afib with rate to 140s, received one dose of lopressor and became hypotensive SBP 78bpm, started on gentle IVF and BP improved to 115/78.   on my evaluation she is sitting up in bed, denies any complaints of chest pain, sob, dizziness or palpitations, complains of severe pain to left shoulder and arm that is chronic but is worse today. pt's mood is very labile crying from pain asking for tramadol.       Subjective: denies complains of chest pain, sob dizziness or palpitations, c/o pain to left arm from infiltrated IV that has been taken out.     TELE: last 24hrs afib rates 110-140bpm    MEDICATIONS  (STANDING):  aspirin  chewable 81 milliGRAM(s) Oral daily  cholecalciferol 400 Unit(s) Oral daily  digoxin     Tablet 0.125 milliGRAM(s) Oral daily  diltiazem    Tablet 30 milliGRAM(s) Oral every 6 hours  diltiazem Infusion 5 mG/Hr (5 mL/Hr) IV Continuous <Continuous>  enoxaparin Injectable 40 milliGRAM(s) SubCutaneous daily  folic acid 1 milliGRAM(s) Oral daily  levothyroxine 25 MICROGram(s) Oral daily  lidocaine   Patch 1 Patch Transdermal every 24 hours  predniSONE   Tablet 5 milliGRAM(s) Oral daily    MEDICATIONS  (PRN):  acetaminophen   Tablet .. 650 milliGRAM(s) Oral every 6 hours PRN Moderate Pain (4 - 6)  ALPRAZolam 0.25 milliGRAM(s) Oral two times a day PRN Anxiety      Allergies    penicillins (Pruritus)  sulfa drugs (Unknown)          Vital Signs Last 24 Hrs  T(C): 36.7 (11 Aug 2020 07:05), Max: 36.7 (11 Aug 2020 07:05)  T(F): 98 (11 Aug 2020 07:05), Max: 98 (11 Aug 2020 07:05)  HR: 121 (11 Aug 2020 07:05) (104 - 141)  BP: 125/81 (11 Aug 2020 07:05) (78/52 - 125/81)  BP(mean): --  RR: 21 (11 Aug 2020 07:05) (18 - 24)  SpO2: 98% (11 Aug 2020 07:05) (95% - 99%)    PHYSICAL EXAMINATION:  GENERAL: In no apparent distress, well nourished, and hydrated.  HEAD:  Atraumatic, Normocephalic  EYES: EOMI, PERRLA, conjunctiva and sclera clear  ENMT: No tonsillar erythema, exudates, or enlargement; Moist mucous membranes, Good dentition, No lesions  NECK: Supple and normal thyroid.  No JVD or carotid bruit.  Carotid pulse is 2+ bilaterally.  HEART: Regular rate and rhythm; No murmurs, rubs, or gallops.  PULMONARY: Clear to auscultation and perfusion.  No rales, wheezing, or rhonchi bilaterally.  ABDOMEN: Soft, Nontender, Nondistended; Bowel sounds present  EXTREMITIES:  2+ Peripheral Pulses, No clubbing, cyanosis, or edema  LYMPH: No lymphadenopathy noted  NEUROLOGICAL: Grossly nonfocal    LABS:                        12.8   7.41  )-----------( 307      ( 11 Aug 2020 09:09 )             41.3     08-10    143  |  112<H>  |  12  ----------------------------<  115<H>  4.8   |  24  |  0.72    Ca    8.6      10 Aug 2020 14:35    TPro  7.0  /  Alb  3.3  /  TBili  1.4<H>  /  DBili  x   /  AST  28  /  ALT  26  /  AlkPhos  53  08-10    PT/INR - ( 10 Aug 2020 15:28 )   PT: 12.2 sec;   INR: 1.05 ratio         PTT - ( 10 Aug 2020 15:28 )  PTT:29.3 sec  Urinalysis Basic - ( 11 Aug 2020 01:15 )    Color: Yellow / Appearance: Clear / S.015 / pH: x  Gluc: x / Ketone: Negative  / Bili: Negative / Urobili: Negative mg/dL   Blood: x / Protein: Negative mg/dL / Nitrite: Negative   Leuk Esterase: Trace / RBC: 3-5 /HPF / WBC 3-5   Sq Epi: x / Non Sq Epi: Few / Bacteria: Occasional      CARDIAC MARKERS ( 11 Aug 2020 01:21 )  0.111 ng/mL / x     / x     / x     / x      CARDIAC MARKERS ( 10 Aug 2020 21:51 )  x     / x     / 48 U/L / x     / x      CARDIAC MARKERS ( 10 Aug 2020 17:05 )  0.053 ng/mL / x     / x     / x     / x      CARDIAC MARKERS ( 10 Aug 2020 14:35 )  <0.015 ng/mL / x     / x     / x     / x            RADIOLOGY & ADDITIONAL TESTS:    LINQ interrogation 2020  current rhythm is afib with RVR rates 100-140s, afib burden over last year 1.4%, over last 12 months mostly in sinus rhythm, current afib episode started 8/10/20 @ 933am, prior to that on 20, no significant day time gina or pauses over last 6months.

## 2020-08-11 NOTE — PHYSICAL THERAPY INITIAL EVALUATION ADULT - PRECAUTIONS/LIMITATIONS, REHAB EVAL
spinal precautions/cardiac precautions/bedrest order cardiac precautions/bedrest order-spoke w/ hospitalist and activity order upgraded./spinal precautions

## 2020-08-11 NOTE — PROGRESS NOTE ADULT - ASSESSMENT
89 yo female with HTN, PAF not on oac due to h/o ICH, CHF, RA admitted with anxiety and afib with RVR.   on home on coreg 3.125mg bid and aspirin 81mg.  she had prior evaluation with Dr Aponte for sinus bradycardia in 40s  in ED started on IV cardizem, BP remains labile and limiting medical therapy, LINQ shows afib burden 1.4% over last year, and this current episode started 8/10/20 and prior to that brief episode on 6/12/20    SSS and PAF with RVR:  since she is not on anticoagulation will pursue rate control strategy  start po cardizem 30mg Q6hrs  cont digoxin 125mcg  cont tele monitoring

## 2020-08-11 NOTE — PHYSICAL THERAPY INITIAL EVALUATION ADULT - ACTIVE RANGE OF MOTION EXAMINATION, REHAB EVAL
bilateral upper extremity Active ROM was WFL (within functional limits)/except L shld elev nil, R shld elev ~70/bilateral  lower extremity Active ROM was WFL (within functional limits)

## 2020-08-11 NOTE — PROVIDER CONTACT NOTE (OTHER) - ASSESSMENT
Pt asymptomatic - HR in 90's at time of call - not currently on cardizem drip due to target HR of 120 - MD made aware

## 2020-08-11 NOTE — PHYSICAL THERAPY INITIAL EVALUATION ADULT - PERTINENT HX OF CURRENT PROBLEM, REHAB EVAL
BIBEMS from home with C/C of generalized weakness and diffuse body aches. chronic L shld pain, new R shld pain, chr. back pain, h/o RA. CT LS: Mild superior compression of L5 is stable from the prior study. MRI R shld pending. trop .015/.053/.111. on cardizem drip for rate control BIBEMS from home with C/C of generalized weakness and diffuse body aches. chronic L shld pain, new R shld pain, chr. back pain, h/o RA. CT LS: Mild superior compression of L5 is stable from the prior study. MRI R shld pending. trop .015/.053/.111. on cardizem drip for rate control-gtt DC'd this a.m, to start p.o

## 2020-08-11 NOTE — PHYSICAL THERAPY INITIAL EVALUATION ADULT - GENERAL OBSERVATIONS, REHAB EVAL
pt rec'd supine in bed on 3E, +HM. pt very talkative, appears somewhat anxious, "high strung". HR 110s-130s->deferred OOB/amb

## 2020-08-11 NOTE — PROGRESS NOTE ADULT - SUBJECTIVE AND OBJECTIVE BOX
CHIEF COMPLAINT/DIAGNOSIS: Weakness, Body Aches    HPI: 89 y/o female with a PMHx of anemia, Afib, b/l hearing loss, breast cancer, CHF (takes Lasix PRN for ankle swelling), goiter, HTN, hypothyroid, ICH, mass on colon, melanoma, RA, SVT, varicose veins, presents to the ED BIBEMS from home with C/C of generalized weakness and diffuse body aches started 3 days ago. Patient reports she started to fell very tired. Denies fever chills. Patient reports she has severe Rheumatoid Arthritis, wherein she has chronic back pain and LEFT shoulder pain, however this pain is different. She reports new RIGHT sided shoulder pain, wherein she cannot  lift her shoulder.  She is on methotrexate on the weekends, steroids and folic acid.     SUBJECTIVE:  8/11 -     REVIEW OF SYSTEMS:  All other review of systems is negative unless indicated above    PHYSICAL EXAM:  Constitutional: NAD, awake and alert, well-developed  HEENT: PERR, EOMI, Normal Hearing, MMM  Neck: Soft and supple, No LAD, No JVD  Respiratory: Breath sounds are clear bilaterally, No wheezing, rales or rhonchi  Cardiovascular: S1 and S2, regular rate and rhythm, no Murmurs, gallops or rubs  Gastrointestinal: Bowel Sounds present, soft, nontender, nondistended, no guarding, no rebound  Extremities: No peripheral edema  Vascular: 2+ peripheral pulses  Neurological: A/O x 3, no focal deficits  Musculoskeletal: 5/5 strength b/l upper and lower extremities  Skin: No rashes    Vital Signs Last 24 Hrs  T(C): 36.7 (11 Aug 2020 07:05), Max: 36.7 (11 Aug 2020 07:05)  T(F): 98 (11 Aug 2020 07:05), Max: 98 (11 Aug 2020 07:05)  HR: 121 (11 Aug 2020 07:05) (104 - 141)  BP: 125/81 (11 Aug 2020 07:05) (78/52 - 125/81)  BP(mean): --  RR: 21 (11 Aug 2020 07:05) (18 - 24)  SpO2: 98% (11 Aug 2020 07:05) (95% - 99%)    LABS: All Labs Reviewed:                        12.7   7.93  )-----------( 273      ( 10 Aug 2020 14:35 )             39.5     08-10    143  |  112<H>  |  12  ----------------------------<  115<H>  4.8   |  24  |  0.72    Ca    8.6      10 Aug 2020 14:35    TPro  7.0  /  Alb  3.3  /  TBili  1.4<H>  /  DBili  x   /  AST  28  /  ALT  26  /  AlkPhos  53  08-10    PT/INR - ( 10 Aug 2020 15:28 )   PT: 12.2 sec;   INR: 1.05 ratio         PTT - ( 10 Aug 2020 15:28 )  PTT:29.3 sec  CARDIAC MARKERS ( 11 Aug 2020 01:21 )  0.111 ng/mL / x     / x     / x     / x      CARDIAC MARKERS ( 10 Aug 2020 21:51 )  x     / x     / 48 U/L / x     / x      CARDIAC MARKERS ( 10 Aug 2020 17:05 )  0.053 ng/mL / x     / x     / x     / x      CARDIAC MARKERS ( 10 Aug 2020 14:35 )  <0.015 ng/mL / x     / x     / x     / x        RADIOLOGY:  < from: Xray Chest 1 View- PORTABLE-Urgent (08.10.20 @ 15:08) >  IMPRESSION: No acute finding or change.  < end of copied text >    < from: CT Lumbar Spine No Cont (08.10.20 @ 19:51) >  Impression: Mild superior compression of L5 is stable from the prior study. No evidence of acute fracture or spondylolisthesis  Extensive degenerative changes as seen on the prior study as well  < end of copied text >    ECHOCARDIOGRAM: Pending     MEDICATIONS:  MEDICATIONS  (STANDING):  aspirin  chewable 81 milliGRAM(s) Oral daily  cholecalciferol 400 Unit(s) Oral daily  digoxin     Tablet 0.125 milliGRAM(s) Oral daily  diltiazem Infusion 5 mG/Hr (5 mL/Hr) IV Continuous <Continuous>  enoxaparin Injectable 40 milliGRAM(s) SubCutaneous daily  folic acid 1 milliGRAM(s) Oral daily  levothyroxine 25 MICROGram(s) Oral daily  lidocaine   Patch 1 Patch Transdermal every 24 hours  predniSONE   Tablet 5 milliGRAM(s) Oral daily    TELEMETRY REVIEW:  8/11 - afib 100-130    ASSESSMENT AND PLAN:    1) Diffuse Body Pain | Generalized Weakness | Lumbar Radiculopathy | Right Shoulder Pain   - MRI R shoulder pending - r/o rotator cuff tear >  - CT Lumbar spine > No evidence of acute fracture or spondylolisthesis, Extensive degenerative changes   - Pain management: Lidocaine patch / Tylenol  - PT consult  - CPK wnl    2) SSS and PAF with RVR   - tele monitoring. tele review as above.  - Patient not on A/C due to hx of ICH -- cont. ASA  - Cont. Cardizem gtt at 5mg/hr (hold for SBP <85bpm)  - d/c Coreg   - Started on PO digoxin   - echo pending > 2/2020 EF 65%, repeat echo pending   - EP f/u appreciated     3) hx of diastolic CHF  - Patient on prn Lasix, euvolemic now  - Hold Lasix    4) hx of RA  - Cont. Prednisone / Folic acid / Vitamin D    5) hx of Hypothyroidism  - Cont. synthroid  - tsh wnl    6) DVT ppx  - SQ Lovenox CHIEF COMPLAINT/DIAGNOSIS: Weakness, Body Aches    HPI: 89 y/o female with a PMHx of anemia, Afib, b/l hearing loss, breast cancer, CHF (takes Lasix PRN for ankle swelling), goiter, HTN, hypothyroid, ICH, mass on colon, melanoma, RA, SVT, varicose veins, presents to the ED BIBEMS from home with C/C of generalized weakness and diffuse body aches started 3 days ago. Patient reports she started to fell very tired. Denies fever chills. Patient reports she has severe Rheumatoid Arthritis, wherein she has chronic back pain and LEFT shoulder pain, however this pain is different. She reports new RIGHT sided shoulder pain, wherein she cannot  lift her shoulder.  She is on methotrexate on the weekends, steroids and folic acid.     SUBJECTIVE:  8/11 -     REVIEW OF SYSTEMS:  All other review of systems is negative unless indicated above    PHYSICAL EXAM:  Constitutional: NAD, awake and alert, well-developed  HEENT: PERR, EOMI, Normal Hearing, MMM  Neck: Soft and supple, No LAD, No JVD  Respiratory: Breath sounds are clear bilaterally, No wheezing, rales or rhonchi  Cardiovascular: S1 and S2, regular rate and rhythm, no Murmurs, gallops or rubs  Gastrointestinal: Bowel Sounds present, soft, nontender, nondistended, no guarding, no rebound  Extremities: No peripheral edema  Vascular: 2+ peripheral pulses  Neurological: A/O x 3, no focal deficits  Musculoskeletal: 5/5 strength b/l upper and lower extremities  Skin: No rashes    Vital Signs Last 24 Hrs  T(C): 36.7 (11 Aug 2020 07:05), Max: 36.7 (11 Aug 2020 07:05)  T(F): 98 (11 Aug 2020 07:05), Max: 98 (11 Aug 2020 07:05)  HR: 121 (11 Aug 2020 07:05) (104 - 141)  BP: 125/81 (11 Aug 2020 07:05) (78/52 - 125/81)  BP(mean): --  RR: 21 (11 Aug 2020 07:05) (18 - 24)  SpO2: 98% (11 Aug 2020 07:05) (95% - 99%)    LABS: All Labs Reviewed:                        12.7   7.93  )-----------( 273      ( 10 Aug 2020 14:35 )             39.5     08-10    143  |  112<H>  |  12  ----------------------------<  115<H>  4.8   |  24  |  0.72    Ca    8.6      10 Aug 2020 14:35    TPro  7.0  /  Alb  3.3  /  TBili  1.4<H>  /  DBili  x   /  AST  28  /  ALT  26  /  AlkPhos  53  08-10    PT/INR - ( 10 Aug 2020 15:28 )   PT: 12.2 sec;   INR: 1.05 ratio         PTT - ( 10 Aug 2020 15:28 )  PTT:29.3 sec  CARDIAC MARKERS ( 11 Aug 2020 01:21 )  0.111 ng/mL / x     / x     / x     / x      CARDIAC MARKERS ( 10 Aug 2020 21:51 )  x     / x     / 48 U/L / x     / x      CARDIAC MARKERS ( 10 Aug 2020 17:05 )  0.053 ng/mL / x     / x     / x     / x      CARDIAC MARKERS ( 10 Aug 2020 14:35 )  <0.015 ng/mL / x     / x     / x     / x        RADIOLOGY:  < from: Xray Chest 1 View- PORTABLE-Urgent (08.10.20 @ 15:08) >  IMPRESSION: No acute finding or change.  < end of copied text >    < from: CT Lumbar Spine No Cont (08.10.20 @ 19:51) >  Impression: Mild superior compression of L5 is stable from the prior study. No evidence of acute fracture or spondylolisthesis  Extensive degenerative changes as seen on the prior study as well  < end of copied text >    ECHOCARDIOGRAM: Pending     MEDICATIONS:  MEDICATIONS  (STANDING):  aspirin  chewable 81 milliGRAM(s) Oral daily  cholecalciferol 400 Unit(s) Oral daily  digoxin     Tablet 0.125 milliGRAM(s) Oral daily  diltiazem Infusion 5 mG/Hr (5 mL/Hr) IV Continuous <Continuous>  enoxaparin Injectable 40 milliGRAM(s) SubCutaneous daily  folic acid 1 milliGRAM(s) Oral daily  levothyroxine 25 MICROGram(s) Oral daily  lidocaine   Patch 1 Patch Transdermal every 24 hours  predniSONE   Tablet 5 milliGRAM(s) Oral daily    TELEMETRY REVIEW:  8/11 - afib 100-130    ASSESSMENT AND PLAN:    1) Diffuse Body Pain | Generalized Weakness | Lumbar Radiculopathy | Right Shoulder Pain   - MRI R shoulder pending - r/o rotator cuff tear >  - CT Lumbar spine > No evidence of acute fracture or spondylolisthesis, Extensive degenerative changes   - Pain management: Lidocaine patch / Tylenol  - PT consult  - CPK wnl    2) SSS and PAF with RVR   - tele monitoring. tele review as above.  - Patient not on A/C due to hx of ICH -- cont. ASA  - Cont. Cardizem gtt at 5mg/hr (hold for SBP <85bpm) - d/c gtt. at start 30mg Cardizem PO q6  - d/c Coreg   - possible anxiety component - start xanax prn   - Started on PO digoxin   - echo pending > 2/2020 EF 65%, repeat echo pending   - EP f/u appreciated     3) hx of diastolic CHF  - Patient on prn Lasix, euvolemic now  - Hold Lasix    4) hx of RA  - Cont. Prednisone / Folic acid / Vitamin D    5) hx of Hypothyroidism  - Cont. synthroid  - tsh wnl    6) DVT ppx  - SQ Lovenox CHIEF COMPLAINT/DIAGNOSIS: Weakness, Body Aches    HPI: 89 y/o female with a PMHx of anemia, Afib, b/l hearing loss, breast cancer, CHF (takes Lasix PRN for ankle swelling), goiter, HTN, hypothyroid, ICH, mass on colon, melanoma, RA, SVT, varicose veins, presents to the ED BIBEMS from home with C/C of generalized weakness and diffuse body aches started 3 days ago. Patient reports she started to fell very tired. Denies fever chills. Patient reports she has severe Rheumatoid Arthritis, wherein she has chronic back pain and LEFT shoulder pain, however this pain is different. She reports new RIGHT sided shoulder pain, wherein she cannot  lift her shoulder.  She is on methotrexate on the weekends, steroids and folic acid.     SUBJECTIVE:  8/11 - anxious, c/o of rt shoulder pain and left AC tenderness (old IV site). denies cp or sob.    REVIEW OF SYSTEMS:  All other review of systems is negative unless indicated above    PHYSICAL EXAM:  Constitutional: Awake and alert, well-developed  HEENT: Normal Hearing, MMM  Neck: Soft and supple, No LAD, No JVD  Respiratory: Breath sounds are clear bilaterally, No wheezing, rales or rhonchi  Cardiovascular: S1 and S2, IRIR; no Murmurs  Gastrointestinal: Bowel Sounds present, soft, nontender, nondistended, no guarding, no rebound  Extremities: Trace ble peripheral edema  Vascular: 2+ peripheral pulses  Neurological: A/O x 3, no focal deficits  Musculoskeletal: 5/5 strength b/l upper and lower extremities  Skin: No rashes    Vital Signs Last 24 Hrs  T(C): 36.7 (11 Aug 2020 07:05), Max: 36.7 (11 Aug 2020 07:05)  T(F): 98 (11 Aug 2020 07:05), Max: 98 (11 Aug 2020 07:05)  HR: 121 (11 Aug 2020 07:05) (104 - 141)  BP: 125/81 (11 Aug 2020 07:05) (78/52 - 125/81)  BP(mean): --  RR: 21 (11 Aug 2020 07:05) (18 - 24)  SpO2: 98% (11 Aug 2020 07:05) (95% - 99%) -- room air    LABS: All Labs Reviewed:                        12.7   7.93  )-----------( 273      ( 10 Aug 2020 14:35 )             39.5     08-10    143  |  112<H>  |  12  ----------------------------<  115<H>  4.8   |  24  |  0.72    Ca    8.6      10 Aug 2020 14:35    TPro  7.0  /  Alb  3.3  /  TBili  1.4<H>  /  DBili  x   /  AST  28  /  ALT  26  /  AlkPhos  53  08-10    PT/INR - ( 10 Aug 2020 15:28 )   PT: 12.2 sec;   INR: 1.05 ratio         PTT - ( 10 Aug 2020 15:28 )  PTT:29.3 sec  CARDIAC MARKERS ( 11 Aug 2020 01:21 )  0.111 ng/mL / x     / x     / x     / x      CARDIAC MARKERS ( 10 Aug 2020 21:51 )  x     / x     / 48 U/L / x     / x      CARDIAC MARKERS ( 10 Aug 2020 17:05 )  0.053 ng/mL / x     / x     / x     / x      CARDIAC MARKERS ( 10 Aug 2020 14:35 )  <0.015 ng/mL / x     / x     / x     / x        RADIOLOGY:  < from: Xray Chest 1 View- PORTABLE-Urgent (08.10.20 @ 15:08) >  IMPRESSION: No acute finding or change.  < end of copied text >    < from: CT Lumbar Spine No Cont (08.10.20 @ 19:51) >  Impression: Mild superior compression of L5 is stable from the prior study. No evidence of acute fracture or spondylolisthesis  Extensive degenerative changes as seen on the prior study as well  < end of copied text >    ECHOCARDIOGRAM: Pending     MEDICATIONS:  MEDICATIONS  (STANDING):  aspirin  chewable 81 milliGRAM(s) Oral daily  cholecalciferol 400 Unit(s) Oral daily  digoxin     Tablet 0.125 milliGRAM(s) Oral daily  diltiazem Infusion 5 mG/Hr (5 mL/Hr) IV Continuous <Continuous>  enoxaparin Injectable 40 milliGRAM(s) SubCutaneous daily  folic acid 1 milliGRAM(s) Oral daily  levothyroxine 25 MICROGram(s) Oral daily  lidocaine   Patch 1 Patch Transdermal every 24 hours  predniSONE   Tablet 5 milliGRAM(s) Oral daily    TELEMETRY REVIEW:  8/11 - afib 100-130    ASSESSMENT AND PLAN:    1) Diffuse Body Pain | Generalized Weakness | Lumbar Radiculopathy | Right Shoulder Pain   - MRI R shoulder pending - r/o rotator cuff tear >  - CT Lumbar spine > No evidence of acute fracture or spondylolisthesis, Extensive degenerative changes   - Pain management: Lidocaine patch / Tylenol  - PT consult  - CPK wnl    2) SSS and PAF with RVR   - tele monitoring. tele review as above.  - Patient not on A/C due to hx of ICH -- cont. ASA 81mg  - Cont. Cardizem gtt at 5mg/hr (hold for SBP <85bpm) - d/c gtt. at start 30mg Cardizem PO q6  - d/c Coreg   - possible anxiety component - start xanax prn   - Started on PO digoxin   - echo pending > 2/2020 EF 65%, repeat echo pending   - EP f/u appreciated     3) hx of diastolic CHF  - Patient on prn Lasix, euvolemic now  - Hold Lasix    4) hx of RA  - Cont. Prednisone / Folic acid / Vitamin D    5) hx of Hypothyroidism  - Cont. synthroid  - tsh wnl    6) DVT ppx  - SQ Lovenox     - D/w Ruben over phone all questions answered (8/11) CHIEF COMPLAINT/DIAGNOSIS: Weakness, Body Aches    HPI: 89 y/o female with a PMHx of anemia, Afib, b/l hearing loss, breast cancer, CHF (takes Lasix PRN for ankle swelling), goiter, HTN, hypothyroid, ICH, mass on colon, melanoma, RA, SVT, varicose veins, presents to the ED BIBEMS from home with C/C of generalized weakness and diffuse body aches started 3 days ago. Patient reports she started to fell very tired. Denies fever chills. Patient reports she has severe Rheumatoid Arthritis, wherein she has chronic back pain and LEFT shoulder pain, however this pain is different. She reports new RIGHT sided shoulder pain, wherein she cannot  lift her shoulder.  She is on methotrexate on the weekends, steroids and folic acid.     SUBJECTIVE:  8/11 - anxious, c/o of rt shoulder pain and left AC tenderness (old IV site). denies cp or sob.    REVIEW OF SYSTEMS:  All other review of systems is negative unless indicated above    PHYSICAL EXAM:  Constitutional: Awake and alert, well-developed  HEENT: Normal Hearing, MMM  Neck: Soft and supple, No LAD, No JVD  Respiratory: Breath sounds are clear bilaterally, No wheezing, rales or rhonchi  Cardiovascular: S1 and S2, IRIR; no Murmurs  Gastrointestinal: Bowel Sounds present, soft, nontender, nondistended, no guarding, no rebound  Extremities: Trace ble peripheral edema  Vascular: 2+ peripheral pulses  Neurological: A/O x 3, no focal deficits  Musculoskeletal: 5/5 strength b/l upper and lower extremities  Skin: No rashes    Vital Signs Last 24 Hrs  T(C): 36.7 (11 Aug 2020 07:05), Max: 36.7 (11 Aug 2020 07:05)  T(F): 98 (11 Aug 2020 07:05), Max: 98 (11 Aug 2020 07:05)  HR: 121 (11 Aug 2020 07:05) (104 - 141)  BP: 125/81 (11 Aug 2020 07:05) (78/52 - 125/81)  BP(mean): --  RR: 21 (11 Aug 2020 07:05) (18 - 24)  SpO2: 98% (11 Aug 2020 07:05) (95% - 99%) -- room air    LABS: All Labs Reviewed:                        12.7   7.93  )-----------( 273      ( 10 Aug 2020 14:35 )             39.5     08-10    143  |  112<H>  |  12  ----------------------------<  115<H>  4.8   |  24  |  0.72    Ca    8.6      10 Aug 2020 14:35    TPro  7.0  /  Alb  3.3  /  TBili  1.4<H>  /  DBili  x   /  AST  28  /  ALT  26  /  AlkPhos  53  08-10    PT/INR - ( 10 Aug 2020 15:28 )   PT: 12.2 sec;   INR: 1.05 ratio         PTT - ( 10 Aug 2020 15:28 )  PTT:29.3 sec  CARDIAC MARKERS ( 11 Aug 2020 01:21 )  0.111 ng/mL / x     / x     / x     / x      CARDIAC MARKERS ( 10 Aug 2020 21:51 )  x     / x     / 48 U/L / x     / x      CARDIAC MARKERS ( 10 Aug 2020 17:05 )  0.053 ng/mL / x     / x     / x     / x      CARDIAC MARKERS ( 10 Aug 2020 14:35 )  <0.015 ng/mL / x     / x     / x     / x        RADIOLOGY:  < from: Xray Chest 1 View- PORTABLE-Urgent (08.10.20 @ 15:08) >  IMPRESSION: No acute finding or change.  < end of copied text >    < from: CT Lumbar Spine No Cont (08.10.20 @ 19:51) >  Impression: Mild superior compression of L5 is stable from the prior study. No evidence of acute fracture or spondylolisthesis  Extensive degenerative changes as seen on the prior study as well  < end of copied text >    ECHOCARDIOGRAM: Pending     MEDICATIONS:  MEDICATIONS  (STANDING):  aspirin  chewable 81 milliGRAM(s) Oral daily  cholecalciferol 400 Unit(s) Oral daily  digoxin     Tablet 0.125 milliGRAM(s) Oral daily  diltiazem Infusion 5 mG/Hr (5 mL/Hr) IV Continuous <Continuous>  enoxaparin Injectable 40 milliGRAM(s) SubCutaneous daily  folic acid 1 milliGRAM(s) Oral daily  levothyroxine 25 MICROGram(s) Oral daily  lidocaine   Patch 1 Patch Transdermal every 24 hours  predniSONE   Tablet 5 milliGRAM(s) Oral daily    TELEMETRY REVIEW:  8/11 - afib 100-130    ASSESSMENT AND PLAN:    1) Diffuse Body Pain | Generalized Weakness | Lumbar Radiculopathy | Right Shoulder Pain   - MRI R shoulder pending - r/o rotator cuff tear >  - CT Lumbar spine > No evidence of acute fracture or spondylolisthesis, Extensive degenerative changes   - Pain management: Lidocaine patch / Tylenol  - PT consult  - CPK wnl    2) SSS and PAF with RVR   - tele monitoring. tele review as above.  - Patient not on A/C due to hx of ICH -- cont. ASA 81mg  - Cont. Cardizem gtt at 5mg/hr (hold for SBP <85bpm) - d/c gtt. at start 30mg Cardizem PO q6  - d/c Coreg   - possible anxiety component - start xanax prn   - Started on PO digoxin   - echo pending > 2/2020 EF 65%, repeat echo pending   - EP f/u appreciated     3) hx of diastolic CHF  - Patient on prn Lasix, euvolemic now  - Hold Lasix    4) hx of RA  - Cont. Prednisone / Folic acid / Vitamin D    5) hx of Hypothyroidism  - Cont. synthroid  - tsh wnl    6) DVT ppx  - SQ Lovenox     - D/w Ruben over phone all questions answered (8/11)  Addendum: Increased Cardizem to 60mg q6 - HR averaging 130s afib. monitor on tele CHIEF COMPLAINT/DIAGNOSIS: Weakness, Body Aches    HPI: 89 y/o female with a PMHx of anemia, Afib, b/l hearing loss, breast cancer, CHF (takes Lasix PRN for ankle swelling), goiter, HTN, hypothyroid, ICH, mass on colon, melanoma, RA, SVT, varicose veins, presents to the ED BIBEMS from home with C/C of generalized weakness and diffuse body aches started 3 days ago. Patient reports she started to fell very tired. Denies fever chills. Patient reports she has severe Rheumatoid Arthritis, wherein she has chronic back pain and LEFT shoulder pain, however this pain is different. She reports new RIGHT sided shoulder pain, wherein she cannot  lift her shoulder.  She is on methotrexate on the weekends, steroids and folic acid.     SUBJECTIVE:  8/11 - anxious, c/o of rt shoulder pain and left AC tenderness (old IV site). denies cp or sob.    REVIEW OF SYSTEMS:  All other review of systems is negative unless indicated above    PHYSICAL EXAM:  Constitutional: Awake and alert, well-developed  HEENT: Normal Hearing, MMM  Neck: Soft and supple, No LAD, No JVD  Respiratory: Breath sounds are clear bilaterally, No wheezing, rales or rhonchi  Cardiovascular: S1 and S2, IRIR; no Murmurs  Gastrointestinal: Bowel Sounds present, soft, nontender, nondistended, no guarding, no rebound  Extremities: Trace ble peripheral edema  Vascular: 2+ peripheral pulses  Neurological: A/O x 3, no focal deficits  Musculoskeletal: 5/5 strength b/l upper and lower extremities  Skin: ecchymosis on L antecubital region w/ mild tenderness, no jaundice, warm, dry    Vital Signs Last 24 Hrs  T(C): 36.7 (11 Aug 2020 07:05), Max: 36.7 (11 Aug 2020 07:05)  T(F): 98 (11 Aug 2020 07:05), Max: 98 (11 Aug 2020 07:05)  HR: 121 (11 Aug 2020 07:05) (104 - 141)  BP: 125/81 (11 Aug 2020 07:05) (78/52 - 125/81)  BP(mean): --  RR: 21 (11 Aug 2020 07:05) (18 - 24)  SpO2: 98% (11 Aug 2020 07:05) (95% - 99%) -- room air    LABS: All Labs Reviewed:                        12.7   7.93  )-----------( 273      ( 10 Aug 2020 14:35 )             39.5     08-10    143  |  112<H>  |  12  ----------------------------<  115<H>  4.8   |  24  |  0.72    Ca    8.6      10 Aug 2020 14:35    TPro  7.0  /  Alb  3.3  /  TBili  1.4<H>  /  DBili  x   /  AST  28  /  ALT  26  /  AlkPhos  53  08-10    PT/INR - ( 10 Aug 2020 15:28 )   PT: 12.2 sec;   INR: 1.05 ratio         PTT - ( 10 Aug 2020 15:28 )  PTT:29.3 sec  CARDIAC MARKERS ( 11 Aug 2020 01:21 )  0.111 ng/mL / x     / x     / x     / x      CARDIAC MARKERS ( 10 Aug 2020 21:51 )  x     / x     / 48 U/L / x     / x      CARDIAC MARKERS ( 10 Aug 2020 17:05 )  0.053 ng/mL / x     / x     / x     / x      CARDIAC MARKERS ( 10 Aug 2020 14:35 )  <0.015 ng/mL / x     / x     / x     / x        RADIOLOGY:  < from: Xray Chest 1 View- PORTABLE-Urgent (08.10.20 @ 15:08) >  IMPRESSION: No acute finding or change.  < end of copied text >    < from: CT Lumbar Spine No Cont (08.10.20 @ 19:51) >  Impression: Mild superior compression of L5 is stable from the prior study. No evidence of acute fracture or spondylolisthesis  Extensive degenerative changes as seen on the prior study as well  < end of copied text >    ECHOCARDIOGRAM: Pending     MEDICATIONS:  MEDICATIONS  (STANDING):  aspirin  chewable 81 milliGRAM(s) Oral daily  cholecalciferol 400 Unit(s) Oral daily  digoxin     Tablet 0.125 milliGRAM(s) Oral daily  diltiazem Infusion 5 mG/Hr (5 mL/Hr) IV Continuous <Continuous>  enoxaparin Injectable 40 milliGRAM(s) SubCutaneous daily  folic acid 1 milliGRAM(s) Oral daily  levothyroxine 25 MICROGram(s) Oral daily  lidocaine   Patch 1 Patch Transdermal every 24 hours  predniSONE   Tablet 5 milliGRAM(s) Oral daily    TELEMETRY REVIEW:  8/11 - afib 100-130    ASSESSMENT AND PLAN:    1) Diffuse Body Pain | Generalized Weakness | Lumbar Radiculopathy | Right Shoulder Pain   - MRI R shoulder pending - r/o rotator cuff tear >  - CT Lumbar spine > No evidence of acute fracture or spondylolisthesis, Extensive degenerative changes   - Pain management: Lidocaine patch / Tylenol  - PT consult  - CPK wnl    2) SSS and PAF with RVR   - tele monitoring. tele review as above.  - Patient not on A/C due to hx of ICH -- cont. ASA 81mg  - Cont. Cardizem gtt at 5mg/hr (hold for SBP <85bpm) - d/c gtt. at start 30mg Cardizem PO q6  - d/c Coreg   - possible anxiety component - start xanax prn   - Started on PO digoxin   - echo 2/2020 EF 65%, repeat echo pending   - EP f/u appreciated     3) hx of diastolic CHF  - Patient on prn Lasix, euvolemic now  - Hold Lasix    4) hx of RA  - Cont. Prednisone / Folic acid / Vitamin D    5) hx of Hypothyroidism  - Cont. synthroid  - tsh wnl    6) DVT ppx  - SQ Lovenox     - D/w Ruben over phone all questions answered (8/11)  Addendum: Increased Cardizem to 60mg q6 - HR averaging 130s afib. monitor on tele

## 2020-08-12 ENCOUNTER — TRANSCRIPTION ENCOUNTER (OUTPATIENT)
Age: 85
End: 2020-08-12

## 2020-08-12 LAB
ANION GAP SERPL CALC-SCNC: 6 MMOL/L — SIGNIFICANT CHANGE UP (ref 5–17)
BUN SERPL-MCNC: 12 MG/DL — SIGNIFICANT CHANGE UP (ref 7–23)
CALCIUM SERPL-MCNC: 8.5 MG/DL — SIGNIFICANT CHANGE UP (ref 8.5–10.1)
CHLORIDE SERPL-SCNC: 111 MMOL/L — HIGH (ref 96–108)
CO2 SERPL-SCNC: 27 MMOL/L — SIGNIFICANT CHANGE UP (ref 22–31)
CREAT SERPL-MCNC: 0.66 MG/DL — SIGNIFICANT CHANGE UP (ref 0.5–1.3)
CULTURE RESULTS: SIGNIFICANT CHANGE UP
GLUCOSE SERPL-MCNC: 139 MG/DL — HIGH (ref 70–99)
MAGNESIUM SERPL-MCNC: 2 MG/DL — SIGNIFICANT CHANGE UP (ref 1.6–2.6)
POTASSIUM SERPL-MCNC: 3.7 MMOL/L — SIGNIFICANT CHANGE UP (ref 3.5–5.3)
POTASSIUM SERPL-SCNC: 3.7 MMOL/L — SIGNIFICANT CHANGE UP (ref 3.5–5.3)
SODIUM SERPL-SCNC: 144 MMOL/L — SIGNIFICANT CHANGE UP (ref 135–145)
SPECIMEN SOURCE: SIGNIFICANT CHANGE UP

## 2020-08-12 PROCEDURE — 99233 SBSQ HOSP IP/OBS HIGH 50: CPT

## 2020-08-12 PROCEDURE — 93010 ELECTROCARDIOGRAM REPORT: CPT

## 2020-08-12 PROCEDURE — 73030 X-RAY EXAM OF SHOULDER: CPT | Mod: 26,RT

## 2020-08-12 RX ORDER — DILTIAZEM HCL 120 MG
240 CAPSULE, EXT RELEASE 24 HR ORAL DAILY
Refills: 0 | Status: DISCONTINUED | OUTPATIENT
Start: 2020-08-12 | End: 2020-08-14

## 2020-08-12 RX ADMIN — Medication 5 MILLIGRAM(S): at 09:43

## 2020-08-12 RX ADMIN — Medication 25 MICROGRAM(S): at 05:50

## 2020-08-12 RX ADMIN — Medication 400 UNIT(S): at 09:52

## 2020-08-12 RX ADMIN — LIDOCAINE 1 PATCH: 4 CREAM TOPICAL at 21:07

## 2020-08-12 RX ADMIN — ENOXAPARIN SODIUM 40 MILLIGRAM(S): 100 INJECTION SUBCUTANEOUS at 09:43

## 2020-08-12 RX ADMIN — Medication 240 MILLIGRAM(S): at 09:44

## 2020-08-12 RX ADMIN — Medication 60 MILLIGRAM(S): at 05:50

## 2020-08-12 RX ADMIN — Medication 0.12 MILLIGRAM(S): at 09:43

## 2020-08-12 RX ADMIN — Medication 1 MILLIGRAM(S): at 09:43

## 2020-08-12 RX ADMIN — Medication 81 MILLIGRAM(S): at 09:42

## 2020-08-12 NOTE — CONSULT NOTE ADULT - ASSESSMENT
89F with R shoulder rotator cuff tear.    pain control  WBAT RUE  PT  Medical management per medical team  DVT ppx  no acute orthopedic surgical intervention indicated  follow up with Dr Smith outpatient when d/c, please call office for an appointment  Discussed plan with Dr. Smith and he agrees.

## 2020-08-12 NOTE — PROGRESS NOTE ADULT - ASSESSMENT
89 yo female with HTN, PAF not on oac due to h/o ICH, CHF, RA admitted with anxiety and afib with RVR.   on home on coreg 3.125mg bid and aspirin 81mg.  she had prior evaluation with Dr Aponte for sinus bradycardia in 40s  in ED started on IV cardizem, BP remains labile and limiting medical therapy, LINQ shows afib burden 1.4% over last year, and this current episode started 8/10/20 and prior to that brief episode on 6/12/20    A/P: SSS and PAF with RVR, now in NSR rate 60-70 bpm  Continue rate control strategy  Not on OAC secondary to hx of ICH  Change cardizem to long acting  Keep off of beta blocker  Continue digoxin 125mcg  F/U with primary cardiologist as outpatient

## 2020-08-12 NOTE — DISCHARGE NOTE NURSING/CASE MANAGEMENT/SOCIAL WORK - PATIENT PORTAL LINK FT
You can access the FollowMyHealth Patient Portal offered by [facility] by registering at the following website: [email or web address]. By joining [facility]’s FollowMyHealth portal, you will also be able to view your health information using other applications (apps) compatible with our system.

## 2020-08-12 NOTE — CONSULT NOTE ADULT - SUBJECTIVE AND OBJECTIVE BOX
89F R hand dominant, with pmh RA, admitted for symptomatic Afib, seen at bedside for R shoulder pain. Pt states the pain began 5 days ago while climbing the stairs. She states she was carrying laundry in one hand and was using the railing with her right hand to help pull herself up the stairs when she felt a twinge and pain in the shoulder. She complains of pain and loss of rang of motion at the R shoulder. She denies trauma or fall, no numbness or tingling, denies any other orthopedic injuries.    PAST MEDICAL & SURGICAL HISTORY:  Bilateral hearing loss, unspecified hearing loss type  History of anemia  Dry eyes, bilateral  SVT (supraventricular tachycardia)  Adhesive capsulitis of left shoulder  Mass of colon: Followed by Dr. Bang  Varicose veins  History of anemia  Melanoma of eye, right: Followed in [facility] by Opthamologist  Breast cancer: s/p lumpectomy  Goiter  HTN (hypertension)  Rheumatoid arthritis  Hypothyroid  ICH (intracerebral hemorrhage): : Followed by Dr. Cesar Stearns  CHF (congestive heart failure)  S/P breast lumpectomy: Left   S/P colonoscopy: unsure of all dates; last one   S/P vein strippin  S/P cataract extraction: B/L;   History of appendectomy  H/O umbilical hernia repair  H/O bilateral hip replacements      Vital Signs Last 24 Hrs  T(C): 36.8 (12 Aug 2020 20:37), Max: 36.8 (12 Aug 2020 20:37)  T(F): 98.2 (12 Aug 2020 20:37), Max: 98.2 (12 Aug 2020 20:37)  HR: 100 (12 Aug 2020 20:37) (75 - 100)  BP: 126/64 (12 Aug 2020 20:37) (102/71 - 126/64)  BP(mean): --  RR: 18 (12 Aug 2020 20:37) (18 - 19)  SpO2: 95% (12 Aug 2020 20:37) (95% - 99%)      PE:    GEN: hard of hearing, pleasant    RUE: TTP over glenohumeral joint, lidocaine patch on, skin intact, nonerythematous, no palpable effusion, AROM limited to 45 degrees of flexion and abduction, pain with PROM at flexion to 90 degrees, positive drop arm test, weak belly press test, SILT C5-T1, motor intact ain/pin/uln/rad/axill, radial pulse present, NTTP at elbow full elbow ROM.      LABS:                          12.8   7.41  )-----------( 307      ( 11 Aug 2020 09:09 )             41.3     08-12    144  |  111<H>  |  12  ----------------------------<  139<H>  3.7   |  27  |  0.66    Ca    8.5      12 Aug 2020 08:49  Phos  3.0     08-11  Mg     2.0     -12    TPro  6.5  /  Alb  3.2<L>  /  TBili  2.0<H>  /  DBili  x   /  AST  21  /  ALT  22  /  AlkPhos  55  08-11      Imaging: MRI R shoulder shows full thickness tears of supraspinatus and subscapularis tendons with retraction into the gleniod, and partial thickness tear and atrophy of the supraspinatous tendon.

## 2020-08-12 NOTE — PROGRESS NOTE ADULT - SUBJECTIVE AND OBJECTIVE BOX
HPI:   89 yo female with PMH HTN, CHF, afib not on oac due to h/o ICH, melanoma, Rheumatoid Arthritis, hypothyroidism,  breast cancer, presented to  ED with anxiety, rapid heart rate and shoulder pain. Pt noted HR at home varying from 40 bpm to 120 bpm and decided to come to hospital. In ED noted in rapid afib with rate to 140s, received one dose of lopressor and became hypotensive SBP 78bpm, started on gentle IVF and BP improved to 115/78.   on my evaluation she is sitting up in bed, denies any complaints of chest pain, sob, dizziness or palpitations, complains of severe pain to left shoulder and arm that is chronic but is worse today. pt's mood is very labile crying from pain asking for tramadol.     8/11/20: TELE: last 24hrs afib rates 110-140bpm    8/12/20: Pt seen OOB in chair, c/o shoulder pain on right > left.  Denies CP, SOB, palpitations.  Very tearful emotionally.  TELE: pt converted to sinus rhythm at 08:30 this AM, rate 60's    MEDICATIONS  (STANDING):  aspirin  chewable 81 milliGRAM(s) Oral daily  cholecalciferol 400 Unit(s) Oral daily  digoxin     Tablet 0.125 milliGRAM(s) Oral daily  diltiazem    milliGRAM(s) Oral daily  enoxaparin Injectable 40 milliGRAM(s) SubCutaneous daily  folic acid 1 milliGRAM(s) Oral daily  levothyroxine 25 MICROGram(s) Oral daily  lidocaine   Patch 1 Patch Transdermal every 24 hours  predniSONE   Tablet 5 milliGRAM(s) Oral daily    MEDICATIONS  (PRN):  acetaminophen   Tablet .. 650 milliGRAM(s) Oral every 6 hours PRN Moderate Pain (4 - 6)  ALPRAZolam 0.25 milliGRAM(s) Oral two times a day PRN Anxiety  traMADol 25 milliGRAM(s) Oral every 6 hours PRN Severe Pain (7 - 10)    Allergies    penicillins (Pruritus)  sulfa drugs (Unknown)    Intolerances                          12.8   7.41  )-----------( 307      ( 11 Aug 2020 09:09 )             41.3     08-12    144  |  111<H>  |  12  ----------------------------<  139<H>  3.7   |  27  |  0.66    Ca    8.5      12 Aug 2020 08:49  Phos  3.0     08-11  Mg     2.0     08-12    TPro  6.5  /  Alb  3.2<L>  /  TBili  2.0<H>  /  DBili  x   /  AST  21  /  ALT  22  /  AlkPhos  55  08-11    CARDIAC MARKERS ( 11 Aug 2020 01:21 )  0.111 ng/mL / x     / x     / x     / x      CARDIAC MARKERS ( 10 Aug 2020 21:51 )  x     / x     / 48 U/L / x     / x      CARDIAC MARKERS ( 10 Aug 2020 17:05 )  0.053 ng/mL / x     / x     / x     / x      CARDIAC MARKERS ( 10 Aug 2020 14:35 )  <0.015 ng/mL / x     / x     / x     / x          PHYSICAL EXAMINATION:  GENERAL: In no apparent distress, well nourished, and hydrated, tearful  HEAD:  Atraumatic, Normocephalic  EYES: EOMI, PERRLA, conjunctiva and sclera clear  ENMT: No tonsillar erythema, exudates, or enlargement; Moist mucous membranes, Good dentition, No lesions  NECK: Supple and normal thyroid.  No JVD or carotid bruit.  Carotid pulse is 2+ bilaterally.  HEART: Regular rate and rhythm; No murmurs, rubs, or gallops.  PULMONARY: Clear to auscultation and perfusion.  No rales, wheezing, or rhonchi bilaterally.  ABDOMEN: Soft, Nontender, Nondistended; Bowel sounds present  EXTREMITIES:  2+ Peripheral Pulses, No clubbing, cyanosis, or edema  LYMPH: No lymphadenopathy noted  NEUROLOGICAL: Grossly nonfocal      LINQ interrogation 8/11/2020  current rhythm is afib with RVR rates 100-140s, afib burden over last year 1.4%, over last 12 months mostly in sinus rhythm, current afib episode started 8/10/20 @ 933am, prior to that on 6/12/20, no significant day time gina or pauses over last 6months.

## 2020-08-12 NOTE — PROVIDER CONTACT NOTE (OTHER) - SITUATION
Dr. Peñaloza spoke with the EP NP in Dr. Aponte's office.
PAtient admitted for afib - mews score of 8
Patient had elevated troponin of 0.111 after previous draw of 0.53
Spoke to April at service is aware of the consult

## 2020-08-12 NOTE — PROGRESS NOTE ADULT - SUBJECTIVE AND OBJECTIVE BOX
CHIEF COMPLAINT/DIAGNOSIS: Weakness, Body Aches    HPI: 89 y/o female with a PMHx of anemia, Afib, b/l hearing loss, breast cancer, CHF (takes Lasix PRN for ankle swelling), goiter, HTN, hypothyroid, ICH, mass on colon, melanoma, RA, SVT, varicose veins, presents to the ED BIBEMS from home with C/C of generalized weakness and diffuse body aches started 3 days ago. Patient reports she started to fell very tired. Denies fever chills. Patient reports she has severe Rheumatoid Arthritis, wherein she has chronic back pain and LEFT shoulder pain, however this pain is different. She reports new RIGHT sided shoulder pain, wherein she cannot  lift her shoulder.  She is on methotrexate on the weekends, steroids and folic acid.     SUBJECTIVE:  8/11 - anxious, c/o of rt shoulder pain and left AC tenderness (old IV site). denies cp or sob.    REVIEW OF SYSTEMS:  All other review of systems is negative unless indicated above    PHYSICAL EXAM:  Constitutional: Awake and alert, well-developed  HEENT: Normal Hearing, MMM  Neck: Soft and supple, No LAD, No JVD  Respiratory: Breath sounds are clear bilaterally, No wheezing, rales or rhonchi  Cardiovascular: S1 and S2, IRIR; no Murmurs  Gastrointestinal: Bowel Sounds present, soft, nontender, nondistended, no guarding, no rebound  Extremities: Trace ble peripheral edema  Vascular: 2+ peripheral pulses  Neurological: A/O x 3, no focal deficits  Musculoskeletal: 5/5 strength b/l upper and lower extremities  Skin: ecchymosis on L antecubital region w/ mild tenderness, no jaundice, warm, dry    Vital Signs Last 24 Hrs  T(C): 36.7 (11 Aug 2020 07:05), Max: 36.7 (11 Aug 2020 07:05)  T(F): 98 (11 Aug 2020 07:05), Max: 98 (11 Aug 2020 07:05)  HR: 121 (11 Aug 2020 07:05) (104 - 141)  BP: 125/81 (11 Aug 2020 07:05) (78/52 - 125/81)  BP(mean): --  RR: 21 (11 Aug 2020 07:05) (18 - 24)  SpO2: 98% (11 Aug 2020 07:05) (95% - 99%) -- room air    LABS: All Labs Reviewed:                        12.7   7.93  )-----------( 273      ( 10 Aug 2020 14:35 )             39.5     08-10    143  |  112<H>  |  12  ----------------------------<  115<H>  4.8   |  24  |  0.72    Ca    8.6      10 Aug 2020 14:35    TPro  7.0  /  Alb  3.3  /  TBili  1.4<H>  /  DBili  x   /  AST  28  /  ALT  26  /  AlkPhos  53  08-10    PT/INR - ( 10 Aug 2020 15:28 )   PT: 12.2 sec;   INR: 1.05 ratio         PTT - ( 10 Aug 2020 15:28 )  PTT:29.3 sec  CARDIAC MARKERS ( 11 Aug 2020 01:21 )  0.111 ng/mL / x     / x     / x     / x      CARDIAC MARKERS ( 10 Aug 2020 21:51 )  x     / x     / 48 U/L / x     / x      CARDIAC MARKERS ( 10 Aug 2020 17:05 )  0.053 ng/mL / x     / x     / x     / x      CARDIAC MARKERS ( 10 Aug 2020 14:35 )  <0.015 ng/mL / x     / x     / x     / x        RADIOLOGY:  < from: MR Shoulder Joint No Cont, Right (08.11.20 @ 18:22) >  IMPRESSION:    Full-thickness, full width tearing of the supraspinatus tendon with retraction of torn tendon to the level of the glenoid. Severe tendinosis and high-grade partial thickness articular sided tearing of the infraspinatus tendon. High-grade tearing of the subscapularis tendon, which appears full-thickness, with retraction of torn tendon. Moderate to severe atrophy of the supraspinatus muscle and mild atrophy of the infraspinatus muscle. Small fluid in the subacromial/subdeltoid bursa.    Severe arthritic changes of the glenohumeral joint.    Severe AC joint arthrosis with prominent osteophytes, spurring at the anterior inferior acromion.    The long head of biceps tendon is not well-visualized, likely torn and distally retracted.    Glenohumeral joint effusion with some low signal intensity debris/synovitis.    Severe degenerative changes and tearing of the glenoid labrum.    No evidence of acute fracture.    < end of copied text >          < from: Xray Chest 1 View- PORTABLE-Urgent (08.10.20 @ 15:08) >  IMPRESSION: No acute finding or change.  < end of copied text >    < from: CT Lumbar Spine No Cont (08.10.20 @ 19:51) >  Impression: Mild superior compression of L5 is stable from the prior study. No evidence of acute fracture or spondylolisthesis  Extensive degenerative changes as seen on the prior study as well  < end of copied text >    ECHOCARDIOGRAM:   < from: TTE Echo Complete w/o Contrast w/ Doppler (08.11.20 @ 11:39) >   Impression   Summary   The aortic valve is visualized, appears mildly sclerotic/calcific.   Valve opening appears normal.   Trace aortic regurgitation is present.   Normal appearing left atrium.   The left ventricle is normal in size, wall thickness, wall motion and   contractility.   Estimated left ventricular ejection fraction is 60-65 %.   The IVC appears normal.   There is mild thickening of both mitral valve leaflets.   The leaflet opening is normal.   Mild mitral annular calcification is present.   At least Mild (1+) mitral regurgitation is present.   No evidence of pericardial effusion.  No evidence of pleural effusion.   Pulmonic valve not well seen, probably normal pulmonic valve function.   Mild pulmonic valvular regurgitation (1+) is present.   Normal appearing right atrium.   Normal appearing right ventricle structure and function.   Mild (1+) tricuspid valve regurgitation is present.  < end of copied text >    MEDICATIONS  (STANDING):  aspirin  chewable 81 milliGRAM(s) Oral daily  cholecalciferol 400 Unit(s) Oral daily  digoxin     Tablet 0.125 milliGRAM(s) Oral daily  diltiazem    milliGRAM(s) Oral daily  enoxaparin Injectable 40 milliGRAM(s) SubCutaneous daily  folic acid 1 milliGRAM(s) Oral daily  levothyroxine 25 MICROGram(s) Oral daily  lidocaine   Patch 1 Patch Transdermal every 24 hours  predniSONE   Tablet 5 milliGRAM(s) Oral daily    MEDICATIONS  (PRN):  acetaminophen   Tablet .. 650 milliGRAM(s) Oral every 6 hours PRN Moderate Pain (4 - 6)  ALPRAZolam 0.25 milliGRAM(s) Oral two times a day PRN Anxiety  traMADol 25 milliGRAM(s) Oral every 6 hours PRN Severe Pain (7 - 10)    TELEMETRY REVIEW:  8/12 - sinus 60-70s     ASSESSMENT AND PLAN:    1) Diffuse Body Pain | Generalized Weakness | Lumbar Radiculopathy | Right Shoulder Pain   - MRI R shoulder pending - r/o rotator cuff tear >  - CT Lumbar spine > No evidence of acute fracture or spondylolisthesis, Extensive degenerative changes   - Pain management: Lidocaine patch / Tylenol  - PT consult  - CPK wnl    2) SSS and PAF with RVR   - tele monitoring. tele review as above.  - Patient not on A/C due to hx of ICH -- cont. ASA 81mg  - Cont. Cardizem gtt at 5mg/hr (hold for SBP <85bpm) - d/c gtt. at start 30mg Cardizem PO q6  - d/c Coreg   - possible anxiety component - start xanax prn   - Started on PO digoxin   - echo 2/2020 EF 65%, repeat echo pending   - EP f/u appreciated     3) hx of diastolic CHF  - Patient on prn Lasix, euvolemic now  - Hold Lasix    4) hx of RA  - Cont. Prednisone / Folic acid / Vitamin D    5) hx of Hypothyroidism  - Cont. synthroid  - tsh wnl    6) DVT ppx  - SQ Lovenox     - D/w Ruben over phone all questions answered (8/11)  Addendum: Increased Cardizem to 60mg q6 - HR averaging 130s afib. monitor on tele CHIEF COMPLAINT/DIAGNOSIS: Weakness, Body Aches    HPI: 89 y/o female with a PMHx of anemia, Afib, b/l hearing loss, breast cancer, CHF (takes Lasix PRN for ankle swelling), goiter, HTN, hypothyroid, ICH, mass on colon, melanoma, RA, SVT, varicose veins, presents to the ED BIBEMS from home with C/C of generalized weakness and diffuse body aches started 3 days ago. Patient reports she started to fell very tired. Denies fever chills. Patient reports she has severe Rheumatoid Arthritis, wherein she has chronic back pain and LEFT shoulder pain, however this pain is different. She reports new RIGHT sided shoulder pain, wherein she cannot  lift her shoulder.  She is on methotrexate on the weekends, steroids and folic acid.     SUBJECTIVE:  8/11 - anxious, c/o of rt shoulder pain and left AC tenderness (old IV site). denies cp or sob. upset about current home situation    REVIEW OF SYSTEMS:  All other review of systems is negative unless indicated above    PHYSICAL EXAM:  Constitutional: Awake and alert, well-developed  HEENT: Normal Hearing, MMM  Neck: Soft and supple, No LAD, No JVD  Respiratory: Breath sounds are clear bilaterally, No wheezing, rales or rhonchi  Cardiovascular: S1 and S2, RRR,  no Murmurs  Gastrointestinal: Bowel Sounds present, soft, nontender, nondistended, no guarding, no rebound  Extremities: Trace ble peripheral edema  Vascular: 2+ peripheral pulses  Neurological: A/O x 3, no focal deficits  Musculoskeletal: 5/5 strength b/l upper and lower extremities  Skin: ecchymosis on L antecubital region w/ mild tenderness, no jaundice, warm, dry    Vital Signs Last 24 Hrs  T(C): 36.6 (12 Aug 2020 07:41), Max: 37 (11 Aug 2020 20:52)  T(F): 97.8 (12 Aug 2020 07:41), Max: 98.6 (11 Aug 2020 20:52)  HR: 83 (12 Aug 2020 07:41) (75 - 99)  BP: 122/67 (12 Aug 2020 07:41) (91/55 - 122/67)  BP(mean): --  RR: 19 (12 Aug 2020 07:41) (18 - 19)  SpO2: 99% (12 Aug 2020 07:41) (97% - 99%) -- room air    LABS: All Labs Reviewed:    08-12    144  |  111<H>  |  12  ----------------------------<  139<H>  3.7   |  27  |  0.66    Ca    8.5      12 Aug 2020 08:49  Phos  3.0     08-11  Mg     2.0     08-12    TPro  6.5  /  Alb  3.2<L>  /  TBili  2.0<H>  /  DBili  x   /  AST  21  /  ALT  22  /  AlkPhos  55  08-11    PT/INR - ( 10 Aug 2020 15:28 )   PT: 12.2 sec;   INR: 1.05 ratio         PTT - ( 10 Aug 2020 15:28 )  PTT:29.3 sec  CARDIAC MARKERS ( 11 Aug 2020 09:09 )  0.059 ng/mL / x     / x     / x     / x      CARDIAC MARKERS ( 11 Aug 2020 01:21 )  0.111 ng/mL / x     / x     / x     / x      CARDIAC MARKERS ( 10 Aug 2020 21:51 )  x     / x     / 48 U/L / x     / x      CARDIAC MARKERS ( 10 Aug 2020 17:05 )  0.053 ng/mL / x     / x     / x     / x      CARDIAC MARKERS ( 10 Aug 2020 14:35 )  <0.015 ng/mL / x     / x     / x     / x        RADIOLOGY:  < from: MR Shoulder Joint No Cont, Right (08.11.20 @ 18:22) >  IMPRESSION:  Full-thickness, full width tearing of the supraspinatus tendon with retraction of torn tendon to the level of the glenoid. Severe tendinosis and high-grade partial thickness articular sided tearing of the infraspinatus tendon. High-grade tearing of the subscapularis tendon, which appears full-thickness, with retraction of torn tendon. Moderate to severe atrophy of the supraspinatus muscle and mild atrophy of the infraspinatus muscle. Small fluid in the subacromial/subdeltoid bursa.  Severe arthritic changes of the glenohumeral joint.  Severe AC joint arthrosis with prominent osteophytes, spurring at the anterior inferior acromion.  The long head of biceps tendon is not well-visualized, likely torn and distally retracted.  Glenohumeral joint effusion with some low signal intensity debris/synovitis.  Severe degenerative changes and tearing of the glenoid labrum.  No evidence of acute fracture.  < end of copied text >      < from: Xray Chest 1 View- PORTABLE-Urgent (08.10.20 @ 15:08) >  IMPRESSION: No acute finding or change.  < end of copied text >    < from: CT Lumbar Spine No Cont (08.10.20 @ 19:51) >  Impression: Mild superior compression of L5 is stable from the prior study. No evidence of acute fracture or spondylolisthesis  Extensive degenerative changes as seen on the prior study as well  < end of copied text >    ECHOCARDIOGRAM:   < from: TTE Echo Complete w/o Contrast w/ Doppler (08.11.20 @ 11:39) >   Impression   Summary   The aortic valve is visualized, appears mildly sclerotic/calcific.   Valve opening appears normal.   Trace aortic regurgitation is present.   Normal appearing left atrium.   The left ventricle is normal in size, wall thickness, wall motion and   contractility.   Estimated left ventricular ejection fraction is 60-65 %.   The IVC appears normal.   There is mild thickening of both mitral valve leaflets.   The leaflet opening is normal.   Mild mitral annular calcification is present.   At least Mild (1+) mitral regurgitation is present.   No evidence of pericardial effusion.  No evidence of pleural effusion.   Pulmonic valve not well seen, probably normal pulmonic valve function.   Mild pulmonic valvular regurgitation (1+) is present.   Normal appearing right atrium.   Normal appearing right ventricle structure and function.   Mild (1+) tricuspid valve regurgitation is present.  < end of copied text >    MEDICATIONS  (STANDING):  aspirin  chewable 81 milliGRAM(s) Oral daily  cholecalciferol 400 Unit(s) Oral daily  digoxin     Tablet 0.125 milliGRAM(s) Oral daily  diltiazem    milliGRAM(s) Oral daily  enoxaparin Injectable 40 milliGRAM(s) SubCutaneous daily  folic acid 1 milliGRAM(s) Oral daily  levothyroxine 25 MICROGram(s) Oral daily  lidocaine   Patch 1 Patch Transdermal every 24 hours  predniSONE   Tablet 5 milliGRAM(s) Oral daily    MEDICATIONS  (PRN):  acetaminophen   Tablet .. 650 milliGRAM(s) Oral every 6 hours PRN Moderate Pain (4 - 6)  ALPRAZolam 0.25 milliGRAM(s) Oral two times a day PRN Anxiety  traMADol 25 milliGRAM(s) Oral every 6 hours PRN Severe Pain (7 - 10)    TELEMETRY REVIEW:  8/12 - sinus 60-70s     ASSESSMENT AND PLAN:    1) Diffuse Body Pain | Generalized Weakness | Lumbar Radiculopathy | Right Shoulder Pain   - MRI R shoulder as above.  - CT Lumbar spine > No evidence of acute fracture or spondylolisthesis, Extensive degenerative changes   - Pain management: Lidocaine patch / Tylenol  - PT consult  - CPK wnl  - Ortho consult     2) SSS and PAF with RVR   - tele monitoring. tele review as above. now in NSR  - Patient not on A/C due to hx of ICH -- cont. ASA 81mg  - s/p Cardizem gtt. -- transition to LA Cardizem 240mg    - d/c Coreg   - possible anxiety component - start xanax prn   - Cont. PO digoxin   - echo 2/2020 EF 65%, repeat echo as above. stable.   - EP f/u appreciated     3) hx of diastolic CHF  - Patient on prn Lasix, euvolemic now  - Hold Lasix    4) hx of RA  - Cont. Prednisone / Folic acid / Vitamin D    5) hx of Hypothyroidism  - Cont. Synthroid  - tsh wnl    6) DVT ppx  - SQ Lovenox     - D/w Ruben over phone all questions answered (8/12)

## 2020-08-13 ENCOUNTER — TRANSCRIPTION ENCOUNTER (OUTPATIENT)
Age: 85
End: 2020-08-13

## 2020-08-13 LAB
ANION GAP SERPL CALC-SCNC: 5 MMOL/L — SIGNIFICANT CHANGE UP (ref 5–17)
BUN SERPL-MCNC: 13 MG/DL — SIGNIFICANT CHANGE UP (ref 7–23)
CALCIUM SERPL-MCNC: 8.7 MG/DL — SIGNIFICANT CHANGE UP (ref 8.5–10.1)
CHLORIDE SERPL-SCNC: 111 MMOL/L — HIGH (ref 96–108)
CO2 SERPL-SCNC: 29 MMOL/L — SIGNIFICANT CHANGE UP (ref 22–31)
CREAT SERPL-MCNC: 0.72 MG/DL — SIGNIFICANT CHANGE UP (ref 0.5–1.3)
GLUCOSE SERPL-MCNC: 102 MG/DL — HIGH (ref 70–99)
POTASSIUM SERPL-MCNC: 4 MMOL/L — SIGNIFICANT CHANGE UP (ref 3.5–5.3)
POTASSIUM SERPL-SCNC: 4 MMOL/L — SIGNIFICANT CHANGE UP (ref 3.5–5.3)
SODIUM SERPL-SCNC: 145 MMOL/L — SIGNIFICANT CHANGE UP (ref 135–145)

## 2020-08-13 PROCEDURE — 99239 HOSP IP/OBS DSCHRG MGMT >30: CPT

## 2020-08-13 RX ORDER — DILTIAZEM HCL 120 MG
1 CAPSULE, EXT RELEASE 24 HR ORAL
Qty: 30 | Refills: 0
Start: 2020-08-13 | End: 2020-09-11

## 2020-08-13 RX ORDER — DIGOXIN 250 MCG
1 TABLET ORAL
Qty: 30 | Refills: 0
Start: 2020-08-13 | End: 2020-09-11

## 2020-08-13 RX ORDER — LIDOCAINE 4 G/100G
1 CREAM TOPICAL
Qty: 5 | Refills: 0
Start: 2020-08-13 | End: 2020-08-17

## 2020-08-13 RX ORDER — METOPROLOL TARTRATE 50 MG
1 TABLET ORAL
Qty: 0 | Refills: 0 | DISCHARGE

## 2020-08-13 RX ADMIN — ENOXAPARIN SODIUM 40 MILLIGRAM(S): 100 INJECTION SUBCUTANEOUS at 10:37

## 2020-08-13 RX ADMIN — LIDOCAINE 1 PATCH: 4 CREAM TOPICAL at 19:45

## 2020-08-13 RX ADMIN — Medication 650 MILLIGRAM(S): at 11:37

## 2020-08-13 RX ADMIN — Medication 1 MILLIGRAM(S): at 10:37

## 2020-08-13 RX ADMIN — Medication 0.12 MILLIGRAM(S): at 10:37

## 2020-08-13 RX ADMIN — LIDOCAINE 1 PATCH: 4 CREAM TOPICAL at 09:00

## 2020-08-13 RX ADMIN — Medication 25 MICROGRAM(S): at 05:54

## 2020-08-13 RX ADMIN — Medication 650 MILLIGRAM(S): at 10:37

## 2020-08-13 RX ADMIN — LIDOCAINE 1 PATCH: 4 CREAM TOPICAL at 18:01

## 2020-08-13 RX ADMIN — Medication 400 UNIT(S): at 15:00

## 2020-08-13 RX ADMIN — Medication 240 MILLIGRAM(S): at 10:37

## 2020-08-13 RX ADMIN — Medication 5 MILLIGRAM(S): at 10:37

## 2020-08-13 RX ADMIN — LIDOCAINE 1 PATCH: 4 CREAM TOPICAL at 07:48

## 2020-08-13 RX ADMIN — Medication 81 MILLIGRAM(S): at 10:37

## 2020-08-13 NOTE — DISCHARGE NOTE PROVIDER - NSDCMRMEDTOKEN_GEN_ALL_CORE_FT
aspirin 81 mg oral tablet: 1 tab(s) orally once a day  digoxin 125 mcg (0.125 mg) oral tablet: 1 tab(s) orally once a day  dilTIAZem 240 mg/24 hours oral capsule, extended release: 1 cap(s) orally once a day  folic acid 1 mg oral tablet: 2 tab(s) orally once a day  lidocaine 5% topical film: Apply topically to affected area once a day   methotrexate 2.5 mg oral tablet: 4 tab(s) orally 2 times a week on Sat and Sun  predniSONE 5 mg oral tablet: 1 tab(s) orally once a day  Synthroid 25 mcg (0.025 mg) oral tablet: 1 tab(s) orally once a day  traMADol 50 mg oral tablet: 0.5 - 1 tabletorally 4 times daily if needed for severe pain  Vitamin B-12 1000 mcg oral tablet: 1 tab(s) orally once a day  Vitamin D3 400 intl units (10 mcg) oral tablet: 1 tab(s) orally once a day

## 2020-08-13 NOTE — DISCHARGE NOTE PROVIDER - NSDCCAREPROVSEEN_GEN_ALL_CORE_FT
Shital Marina (Nurse Practitioner)  Shannon Long (Hospitalist) Shital Marina (Nurse Practitioner)  Shannon Long (Hospitalist)  Shital Marina R

## 2020-08-13 NOTE — PROGRESS NOTE ADULT - SUBJECTIVE AND OBJECTIVE BOX
CHIEF COMPLAINT/DIAGNOSIS: Weakness, Body Aches    HPI: 89 y/o female with a PMHx of anemia, Afib, b/l hearing loss, breast cancer, CHF (takes Lasix PRN for ankle swelling), goiter, HTN, hypothyroid, ICH, mass on colon, melanoma, RA, SVT, varicose veins, presents to the ED BIBEMS from home with C/C of generalized weakness and diffuse body aches started 3 days ago. Patient reports she started to fell very tired. Denies fever chills. Patient reports she has severe Rheumatoid Arthritis, wherein she has chronic back pain and LEFT shoulder pain, however this pain is different. She reports new RIGHT sided shoulder pain, wherein she cannot  lift her shoulder.  She is on methotrexate on the weekends, steroids and folic acid.     SUBJECTIVE:  8/12 - no complaints. eager about discharge./ understands medication changes. f/u cardio / ortho outpatient     REVIEW OF SYSTEMS:  All other review of systems is negative unless indicated above    PHYSICAL EXAM:  Constitutional: Awake and alert, well-developed  HEENT: Normal Hearing, MMM  Neck: Soft and supple, No LAD, No JVD  Respiratory: Breath sounds are clear bilaterally, No wheezing, rales or rhonchi  Cardiovascular: S1 and S2, RRR,  no Murmurs  Gastrointestinal: Bowel Sounds present, soft, nontender, nondistended, no guarding, no rebound  Extremities: Trace ble peripheral edema  Vascular: 2+ peripheral pulses  Neurological: A/O x 3, no focal deficits  Musculoskeletal: 5/5 strength b/l upper and lower extremities  Skin: ecchymosis on L antecubital region w/ mild tenderness, no jaundice, warm, dry    Vital Signs Last 24 Hrs  T(C): 36.3 (13 Aug 2020 07:02), Max: 36.6 (12 Aug 2020 22:28)  T(F): 97.4 (13 Aug 2020 07:02), Max: 97.8 (12 Aug 2020 22:28)  HR: 60 (13 Aug 2020 07:02) (51 - 60)  BP: 146/53 (13 Aug 2020 07:02) (130/47 - 146/53)  BP(mean): --  RR: 18 (13 Aug 2020 07:02) (17 - 18)  SpO2: 98% (13 Aug 2020 07:02) (96% - 98%)    LABS: All Labs Reviewed:    08-13    145  |  111<H>  |  13  ----------------------------<  102<H>  4.0   |  29  |  0.72    Ca    8.7      13 Aug 2020 07:00  Mg     2.0     08-12    RADIOLOGY:  < from: MR Shoulder Joint No Cont, Right (08.11.20 @ 18:22) >  IMPRESSION:  Full-thickness, full width tearing of the supraspinatus tendon with retraction of torn tendon to the level of the glenoid. Severe tendinosis and high-grade partial thickness articular sided tearing of the infraspinatus tendon. High-grade tearing of the subscapularis tendon, which appears full-thickness, with retraction of torn tendon. Moderate to severe atrophy of the supraspinatus muscle and mild atrophy of the infraspinatus muscle. Small fluid in the subacromial/subdeltoid bursa.  Severe arthritic changes of the glenohumeral joint.  Severe AC joint arthrosis with prominent osteophytes, spurring at the anterior inferior acromion.  The long head of biceps tendon is not well-visualized, likely torn and distally retracted.  Glenohumeral joint effusion with some low signal intensity debris/synovitis.  Severe degenerative changes and tearing of the glenoid labrum.  No evidence of acute fracture.  < end of copied text >      < from: Xray Chest 1 View- PORTABLE-Urgent (08.10.20 @ 15:08) >  IMPRESSION: No acute finding or change.  < end of copied text >    < from: CT Lumbar Spine No Cont (08.10.20 @ 19:51) >  Impression: Mild superior compression of L5 is stable from the prior study. No evidence of acute fracture or spondylolisthesis  Extensive degenerative changes as seen on the prior study as well  < end of copied text >    ECHOCARDIOGRAM:   < from: TTE Echo Complete w/o Contrast w/ Doppler (08.11.20 @ 11:39) >   Impression   Summary   The aortic valve is visualized, appears mildly sclerotic/calcific.   Valve opening appears normal.   Trace aortic regurgitation is present.   Normal appearing left atrium.   The left ventricle is normal in size, wall thickness, wall motion and   contractility.   Estimated left ventricular ejection fraction is 60-65 %.   The IVC appears normal.   There is mild thickening of both mitral valve leaflets.   The leaflet opening is normal.   Mild mitral annular calcification is present.   At least Mild (1+) mitral regurgitation is present.   No evidence of pericardial effusion.  No evidence of pleural effusion.   Pulmonic valve not well seen, probably normal pulmonic valve function.   Mild pulmonic valvular regurgitation (1+) is present.   Normal appearing right atrium.   Normal appearing right ventricle structure and function.   Mild (1+) tricuspid valve regurgitation is present.  < end of copied text >    MEDICATIONS  (STANDING):  aspirin  chewable 81 milliGRAM(s) Oral daily  cholecalciferol 400 Unit(s) Oral daily  digoxin     Tablet 0.125 milliGRAM(s) Oral daily  diltiazem    milliGRAM(s) Oral daily  enoxaparin Injectable 40 milliGRAM(s) SubCutaneous daily  folic acid 1 milliGRAM(s) Oral daily  levothyroxine 25 MICROGram(s) Oral daily  lidocaine   Patch 1 Patch Transdermal every 24 hours  predniSONE   Tablet 5 milliGRAM(s) Oral daily    MEDICATIONS  (PRN):  acetaminophen   Tablet .. 650 milliGRAM(s) Oral every 6 hours PRN Moderate Pain (4 - 6)  ALPRAZolam 0.25 milliGRAM(s) Oral two times a day PRN Anxiety  traMADol 25 milliGRAM(s) Oral every 6 hours PRN Severe Pain (7 - 10)      ASSESSMENT AND PLAN:    1) Diffuse Body Pain | Generalized Weakness | Lumbar Radiculopathy | Right Shoulder Pain   - MRI R shoulder as above.  - CT Lumbar spine > No evidence of acute fracture or spondylolisthesis, Extensive degenerative changes   - Pain management: Lidocaine patch / Tylenol  - PT consult  - CPK wnl  - Ortho consult - f/u outpatient     2) SSS and PAF with RVR   - tele monitoring. tele review as above. now in NSR  - Patient not on A/C due to hx of ICH -- cont. ASA 81mg  - s/p Cardizem gtt. -- transition to LA Cardizem 240mg    - d/c Coreg   - possible anxiety component - start xanax prn   - Cont. PO digoxin   - echo 2/2020 EF 65%, repeat echo as above. stable.   - EP f/u appreciated   - cardio (popaleo) f/u outpatient     3) hx of diastolic CHF  - Patient on prn Lasix, euvolemic now  - Hold Lasix    4) hx of RA  - Cont. Prednisone / Folic acid / Vitamin D    5) hx of Hypothyroidism  - Cont. Synthroid  - tsh wnl    6) DVT ppx  - SQ Lovenox     d/c JAIME. waiting for auth. covid pcr pending. CHIEF COMPLAINT/DIAGNOSIS: Weakness, Body Aches    HPI: 89 y/o female with a PMHx of anemia, Afib, b/l hearing loss, breast cancer, CHF (takes Lasix PRN for ankle swelling), goiter, HTN, hypothyroid, ICH, mass on colon, melanoma, RA, SVT, varicose veins, presents to the ED BIBEMS from home with C/C of generalized weakness and diffuse body aches started 3 days ago. Patient reports she started to fell very tired. Denies fever chills. Patient reports she has severe Rheumatoid Arthritis, wherein she has chronic back pain and LEFT shoulder pain, however this pain is different. She reports new RIGHT sided shoulder pain, wherein she cannot  lift her shoulder.  She is on methotrexate on the weekends, steroids and folic acid.     SUBJECTIVE:  8/13 - no complaints. eager about discharge/ understands medication changes. f/u cardio / ortho outpatient     REVIEW OF SYSTEMS:  All other review of systems is negative unless indicated above    PHYSICAL EXAM:  Constitutional: Awake and alert, well-developed  HEENT: Normal Hearing, MMM  Neck: Soft and supple, No LAD, No JVD  Respiratory: Breath sounds are clear bilaterally, No wheezing, rales or rhonchi  Cardiovascular: S1 and S2, RRR,  no Murmurs  Gastrointestinal: Bowel Sounds present, soft, nontender, nondistended, no guarding, no rebound  Extremities: Trace ble peripheral edema  Vascular: 2+ peripheral pulses  Neurological: A/O x 3, no focal deficits  Musculoskeletal: 5/5 strength b/l upper and lower extremities  Skin: ecchymosis on L antecubital region w/ mild tenderness, no jaundice, warm, dry    Vital Signs Last 24 Hrs  T(C): 36.3 (13 Aug 2020 07:02), Max: 36.6 (12 Aug 2020 22:28)  T(F): 97.4 (13 Aug 2020 07:02), Max: 97.8 (12 Aug 2020 22:28)  HR: 60 (13 Aug 2020 07:02) (51 - 60)  BP: 146/53 (13 Aug 2020 07:02) (130/47 - 146/53)  BP(mean): --  RR: 18 (13 Aug 2020 07:02) (17 - 18)  SpO2: 98% (13 Aug 2020 07:02) (96% - 98%) -- room air    LABS: All Labs Reviewed:    08-13    145  |  111<H>  |  13  ----------------------------<  102<H>  4.0   |  29  |  0.72    Ca    8.7      13 Aug 2020 07:00  Mg     2.0     08-12    RADIOLOGY:  < from: MR Shoulder Joint No Cont, Right (08.11.20 @ 18:22) >  IMPRESSION:  Full-thickness, full width tearing of the supraspinatus tendon with retraction of torn tendon to the level of the glenoid. Severe tendinosis and high-grade partial thickness articular sided tearing of the infraspinatus tendon. High-grade tearing of the subscapularis tendon, which appears full-thickness, with retraction of torn tendon. Moderate to severe atrophy of the supraspinatus muscle and mild atrophy of the infraspinatus muscle. Small fluid in the subacromial/subdeltoid bursa.  Severe arthritic changes of the glenohumeral joint.  Severe AC joint arthrosis with prominent osteophytes, spurring at the anterior inferior acromion.  The long head of biceps tendon is not well-visualized, likely torn and distally retracted.  Glenohumeral joint effusion with some low signal intensity debris/synovitis.  Severe degenerative changes and tearing of the glenoid labrum.  No evidence of acute fracture.  < end of copied text >      < from: Xray Chest 1 View- PORTABLE-Urgent (08.10.20 @ 15:08) >  IMPRESSION: No acute finding or change.  < end of copied text >    < from: CT Lumbar Spine No Cont (08.10.20 @ 19:51) >  Impression: Mild superior compression of L5 is stable from the prior study. No evidence of acute fracture or spondylolisthesis  Extensive degenerative changes as seen on the prior study as well  < end of copied text >    ECHOCARDIOGRAM:   < from: TTE Echo Complete w/o Contrast w/ Doppler (08.11.20 @ 11:39) >   Impression   Summary   The aortic valve is visualized, appears mildly sclerotic/calcific.   Valve opening appears normal.   Trace aortic regurgitation is present.   Normal appearing left atrium.   The left ventricle is normal in size, wall thickness, wall motion and   contractility.   Estimated left ventricular ejection fraction is 60-65 %.   The IVC appears normal.   There is mild thickening of both mitral valve leaflets.   The leaflet opening is normal.   Mild mitral annular calcification is present.   At least Mild (1+) mitral regurgitation is present.   No evidence of pericardial effusion.  No evidence of pleural effusion.   Pulmonic valve not well seen, probably normal pulmonic valve function.   Mild pulmonic valvular regurgitation (1+) is present.   Normal appearing right atrium.   Normal appearing right ventricle structure and function.   Mild (1+) tricuspid valve regurgitation is present.  < end of copied text >    MEDICATIONS  (STANDING):  aspirin  chewable 81 milliGRAM(s) Oral daily  cholecalciferol 400 Unit(s) Oral daily  digoxin     Tablet 0.125 milliGRAM(s) Oral daily  diltiazem    milliGRAM(s) Oral daily  enoxaparin Injectable 40 milliGRAM(s) SubCutaneous daily  folic acid 1 milliGRAM(s) Oral daily  levothyroxine 25 MICROGram(s) Oral daily  lidocaine   Patch 1 Patch Transdermal every 24 hours  predniSONE   Tablet 5 milliGRAM(s) Oral daily    MEDICATIONS  (PRN):  acetaminophen   Tablet .. 650 milliGRAM(s) Oral every 6 hours PRN Moderate Pain (4 - 6)  ALPRAZolam 0.25 milliGRAM(s) Oral two times a day PRN Anxiety  traMADol 25 milliGRAM(s) Oral every 6 hours PRN Severe Pain (7 - 10)      ASSESSMENT AND PLAN:    1) Diffuse Body Pain | Generalized Weakness | Lumbar Radiculopathy | Right Shoulder Pain   - MRI R shoulder as above.  - CT Lumbar spine > No evidence of acute fracture or spondylolisthesis, Extensive degenerative changes   - Pain management: Lidocaine patch / Tylenol  - PT consult  - CPK wnl  - Ortho consult - f/u outpatient     2) SSS and PAF with RVR   - tele monitoring. tele review as above. now in NSR  - Patient not on A/C due to hx of ICH -- cont. ASA 81mg  - s/p Cardizem gtt. -- transition to LA Cardizem 240mg    - d/c Coreg   - possible anxiety component - start xanax prn   - Cont. PO digoxin   - echo 2/2020 EF 65%, repeat echo as above. stable.   - EP f/u appreciated   - cardio f/u outpatient     3) hx of diastolic CHF  - Patient on prn Lasix, euvolemic now  - Hold Lasix    4) hx of RA  - Cont. Prednisone / Folic acid / Vitamin D    5) hx of Hypothyroidism  - Cont. Synthroid  - tsh wnl    6) DVT ppx  - SQ Lovenox     d/c JAIME. waiting for auth. covid pcr pending. CHIEF COMPLAINT/DIAGNOSIS: Weakness, Body Aches    HPI: 89 y/o female with a PMHx of anemia, Afib, b/l hearing loss, breast cancer, CHF (takes Lasix PRN for ankle swelling), goiter, HTN, hypothyroid, ICH, mass on colon, melanoma, RA, SVT, varicose veins, presents to the ED BIBEMS from home with C/C of generalized weakness and diffuse body aches started 3 days ago. Patient reports she started to fell very tired. Denies fever chills. Patient reports she has severe Rheumatoid Arthritis, wherein she has chronic back pain and LEFT shoulder pain, however this pain is different. She reports new RIGHT sided shoulder pain, wherein she cannot  lift her shoulder.  She is on methotrexate on the weekends, steroids and folic acid.     SUBJECTIVE:  8/13 - no complaints. eager about discharge/ understands medication changes. f/u cardio / ortho outpatient     REVIEW OF SYSTEMS:  All other review of systems is negative unless indicated above    PHYSICAL EXAM:  Constitutional: Awake and alert, well-developed  HEENT: Normal Hearing, MMM  Neck: Soft and supple, No LAD, No JVD  Respiratory: Breath sounds are clear bilaterally, No wheezing, rales or rhonchi  Cardiovascular: S1 and S2, RRR,  no Murmurs  Gastrointestinal: Bowel Sounds present, soft, nontender, nondistended, no guarding, no rebound  Extremities: Trace ble peripheral edema  Vascular: 2+ peripheral pulses  Neurological: A/O x 3, no focal deficits  Musculoskeletal: 5/5 strength b/l upper and lower extremities  Skin: ecchymosis on L antecubital region w/ mild tenderness, no jaundice, warm, dry    Vital Signs Last 24 Hrs  T(C): 36.3 (13 Aug 2020 07:02), Max: 36.6 (12 Aug 2020 22:28)  T(F): 97.4 (13 Aug 2020 07:02), Max: 97.8 (12 Aug 2020 22:28)  HR: 60 (13 Aug 2020 07:02) (51 - 60)  BP: 146/53 (13 Aug 2020 07:02) (130/47 - 146/53)  BP(mean): --  RR: 18 (13 Aug 2020 07:02) (17 - 18)  SpO2: 98% (13 Aug 2020 07:02) (96% - 98%) -- room air    LABS: All Labs Reviewed:    08-13    145  |  111<H>  |  13  ----------------------------<  102<H>  4.0   |  29  |  0.72    Ca    8.7      13 Aug 2020 07:00  Mg     2.0     08-12    RADIOLOGY:  < from: MR Shoulder Joint No Cont, Right (08.11.20 @ 18:22) >  IMPRESSION:  Full-thickness, full width tearing of the supraspinatus tendon with retraction of torn tendon to the level of the glenoid. Severe tendinosis and high-grade partial thickness articular sided tearing of the infraspinatus tendon. High-grade tearing of the subscapularis tendon, which appears full-thickness, with retraction of torn tendon. Moderate to severe atrophy of the supraspinatus muscle and mild atrophy of the infraspinatus muscle. Small fluid in the subacromial/subdeltoid bursa.  Severe arthritic changes of the glenohumeral joint.  Severe AC joint arthrosis with prominent osteophytes, spurring at the anterior inferior acromion.  The long head of biceps tendon is not well-visualized, likely torn and distally retracted.  Glenohumeral joint effusion with some low signal intensity debris/synovitis.  Severe degenerative changes and tearing of the glenoid labrum.  No evidence of acute fracture.  < end of copied text >      < from: Xray Chest 1 View- PORTABLE-Urgent (08.10.20 @ 15:08) >  IMPRESSION: No acute finding or change.  < end of copied text >    < from: CT Lumbar Spine No Cont (08.10.20 @ 19:51) >  Impression: Mild superior compression of L5 is stable from the prior study. No evidence of acute fracture or spondylolisthesis  Extensive degenerative changes as seen on the prior study as well  < end of copied text >    ECHOCARDIOGRAM:   < from: TTE Echo Complete w/o Contrast w/ Doppler (08.11.20 @ 11:39) >   Impression   Summary   The aortic valve is visualized, appears mildly sclerotic/calcific.   Valve opening appears normal.   Trace aortic regurgitation is present.   Normal appearing left atrium.   The left ventricle is normal in size, wall thickness, wall motion and   contractility.   Estimated left ventricular ejection fraction is 60-65 %.   The IVC appears normal.   There is mild thickening of both mitral valve leaflets.   The leaflet opening is normal.   Mild mitral annular calcification is present.   At least Mild (1+) mitral regurgitation is present.   No evidence of pericardial effusion.  No evidence of pleural effusion.   Pulmonic valve not well seen, probably normal pulmonic valve function.   Mild pulmonic valvular regurgitation (1+) is present.   Normal appearing right atrium.   Normal appearing right ventricle structure and function.   Mild (1+) tricuspid valve regurgitation is present.  < end of copied text >    MEDICATIONS  (STANDING):  aspirin  chewable 81 milliGRAM(s) Oral daily  cholecalciferol 400 Unit(s) Oral daily  digoxin     Tablet 0.125 milliGRAM(s) Oral daily  diltiazem    milliGRAM(s) Oral daily  enoxaparin Injectable 40 milliGRAM(s) SubCutaneous daily  folic acid 1 milliGRAM(s) Oral daily  levothyroxine 25 MICROGram(s) Oral daily  lidocaine   Patch 1 Patch Transdermal every 24 hours  predniSONE   Tablet 5 milliGRAM(s) Oral daily    MEDICATIONS  (PRN):  acetaminophen   Tablet .. 650 milliGRAM(s) Oral every 6 hours PRN Moderate Pain (4 - 6)  ALPRAZolam 0.25 milliGRAM(s) Oral two times a day PRN Anxiety  traMADol 25 milliGRAM(s) Oral every 6 hours PRN Severe Pain (7 - 10)      ASSESSMENT AND PLAN:    1) Diffuse Body Pain | Generalized Weakness | Lumbar Radiculopathy | Right Shoulder Pain due to rotator cuff tear   - MRI R shoulder as above, + rotator cuff tear, no surgical intervention per ortho   - CT Lumbar spine > No evidence of acute fracture or spondylolisthesis, Extensive degenerative changes   - Pain management: Lidocaine patch / Tylenol  - PT consult -- d/c JAIME  - CPK wnl  - Ortho consult - f/u outpatient     2) SSS and PAF with RVR   - tele monitoring. tele review as above. now in NSR  - Patient not on A/C due to hx of ICH -- cont. ASA 81mg  - s/p Cardizem gtt. -- transition to LA Cardizem 240mg    - d/c Coreg   - possible anxiety component - start xanax prn   - Cont. PO digoxin   - echo 2/2020 EF 65%, repeat echo as above. stable.   - EP f/u appreciated   - cardio f/u outpatient     3) hx of diastolic CHF  - Patient on prn Lasix, euvolemic now  - Hold Lasix    4) hx of RA  - Cont. Prednisone / Folic acid / Vitamin D    5) hx of Hypothyroidism  - Cont. Synthroid  - tsh wnl    6) DVT ppx  - SQ Lovenox     d/c JAIME. waiting for auth. covid pcr pending.

## 2020-08-13 NOTE — DISCHARGE NOTE PROVIDER - HOSPITAL COURSE
CHIEF COMPLAINT/DIAGNOSIS: Weakness, Body Aches        HPI: 89 y/o female with a PMHx of anemia, Afib, b/l hearing loss, breast cancer, CHF (takes Lasix PRN for ankle swelling), goiter, HTN, hypothyroid, ICH, mass on colon, melanoma, RA, SVT, varicose veins, presents to the ED BIBEMS from home with C/C of generalized weakness and diffuse body aches started 3 days ago. Patient reports she started to fell very tired. Denies fever chills. Patient reports she has severe Rheumatoid Arthritis, wherein she has chronic back pain and LEFT shoulder pain, however this pain is different. She reports new RIGHT sided shoulder pain, wherein she cannot  lift her shoulder.  She is on methotrexate on the weekends, steroids and folic acid.         SUBJECTIVE:    8/12 -         REVIEW OF SYSTEMS:    All other review of systems is negative unless indicated above        PHYSICAL EXAM:    Constitutional: Awake and alert, well-developed    HEENT: Normal Hearing, MMM    Neck: Soft and supple, No LAD, No JVD    Respiratory: Breath sounds are clear bilaterally, No wheezing, rales or rhonchi    Cardiovascular: S1 and S2, RRR,  no Murmurs    Gastrointestinal: Bowel Sounds present, soft, nontender, nondistended, no guarding, no rebound    Extremities: Trace ble peripheral edema    Vascular: 2+ peripheral pulses    Neurological: A/O x 3, no focal deficits    Musculoskeletal: 5/5 strength b/l upper and lower extremities    Skin: ecchymosis on L antecubital region w/ mild tenderness, no jaundice, warm, dry        Vital Signs / Labs / Radiology / Medications : all reviewed         ASSESSMENT AND PLAN:        1) Diffuse Body Pain | Generalized Weakness | Lumbar Radiculopathy | Right Shoulder Pain     - MRI R shoulder as above.    - CT Lumbar spine > No evidence of acute fracture or spondylolisthesis, Extensive degenerative changes     - Pain management: Lidocaine patch / Tylenol    - PT consult    - CPK wnl    - Ortho consult         2) SSS and PAF with RVR     - tele monitoring. tele review as above. now in NSR    - Patient not on A/C due to hx of ICH -- cont. ASA 81mg    - s/p Cardizem gtt. -- transition to LA Cardizem 240mg      - d/c Coreg     - possible anxiety component - start xanax prn     - Cont. PO digoxin     - echo 2/2020 EF 65%, repeat echo as above. stable.     - EP f/u appreciated         3) hx of diastolic CHF    - Patient on prn Lasix, euvolemic now    - Hold Lasix        4) hx of RA    - Cont. Prednisone / Folic acid / Vitamin D        5) hx of Hypothyroidism    - Cont. Synthroid    - tsh wnl        6) DVT ppx    - SQ Lovenox         - D/w Ruben over phone all questions answered (8/12) CHIEF COMPLAINT/DIAGNOSIS: Weakness, Body Aches        HPI: 89 y/o female with a PMHx of anemia, Afib, b/l hearing loss, breast cancer, CHF (takes Lasix PRN for ankle swelling), goiter, HTN, hypothyroid, ICH, mass on colon, melanoma, RA, SVT, varicose veins, presents to the ED BIBEMS from home with C/C of generalized weakness and diffuse body aches started 3 days ago. Patient reports she started to fell very tired. Denies fever chills. Patient reports she has severe Rheumatoid Arthritis, wherein she has chronic back pain and LEFT shoulder pain, however this pain is different. She reports new RIGHT sided shoulder pain, wherein she cannot  lift her shoulder.  She is on methotrexate on the weekends, steroids and folic acid.         SUBJECTIVE:    8/12 - no complaints. eager about discharge./ understands medication changes. f/u cardio / ortho outpatient         REVIEW OF SYSTEMS:    All other review of systems is negative unless indicated above        PHYSICAL EXAM:    Constitutional: Awake and alert, well-developed    HEENT: Normal Hearing, MMM    Neck: Soft and supple, No LAD, No JVD    Respiratory: Breath sounds are clear bilaterally, No wheezing, rales or rhonchi    Cardiovascular: S1 and S2, RRR,  no Murmurs    Gastrointestinal: Bowel Sounds present, soft, nontender, nondistended, no guarding, no rebound    Extremities: Trace ble peripheral edema    Vascular: 2+ peripheral pulses    Neurological: A/O x 3, no focal deficits    Musculoskeletal: 5/5 strength b/l upper and lower extremities    Skin: ecchymosis on L antecubital region w/ mild tenderness, no jaundice, warm, dry        Vital Signs / Labs / Radiology / Medications : all reviewed         ASSESSMENT AND PLAN:        1) Diffuse Body Pain | Generalized Weakness | Lumbar Radiculopathy | Right Shoulder Pain     - MRI R shoulder as above.    - CT Lumbar spine > No evidence of acute fracture or spondylolisthesis, Extensive degenerative changes     - Pain management: Lidocaine patch / Tylenol    - PT consult    - CPK wnl    - Ortho consult - f/u outpatient         2) SSS and PAF with RVR     - tele monitoring. tele review as above. now in NSR    - Patient not on A/C due to hx of ICH -- cont. ASA 81mg    - s/p Cardizem gtt. -- transition to LA Cardizem 240mg      - d/c Coreg     - possible anxiety component - start xanax prn     - Cont. PO digoxin     - echo 2/2020 EF 65%, repeat echo as above. stable.     - EP f/u appreciated     - cardio ([facility]) f/u outpatient         3) hx of diastolic CHF    - Patient on prn Lasix, euvolemic now    - Hold Lasix        4) hx of RA    - Cont. Prednisone / Folic acid / Vitamin D        5) hx of Hypothyroidism    - Cont. Synthroid    - tsh wnl        6) DVT ppx    - SQ Lovenox         d/c home w/ home care. CHIEF COMPLAINT/DIAGNOSIS: Weakness, Body Aches        HPI: 89 y/o female with a PMHx of anemia, Afib, b/l hearing loss, breast cancer, CHF (takes Lasix PRN for ankle swelling), goiter, HTN, hypothyroid, ICH, mass on colon, melanoma, RA, SVT, varicose veins, presents to the ED BIBEMS from home with C/C of generalized weakness and diffuse body aches started 3 days ago. Patient reports she started to fell very tired. Denies fever chills. Patient reports she has severe Rheumatoid Arthritis, wherein she has chronic back pain and LEFT shoulder pain, however this pain is different. She reports new RIGHT sided shoulder pain, wherein she cannot  lift her shoulder.  She is on methotrexate on the weekends, steroids and folic acid.         SUBJECTIVE:    8/12 - no complaints. eager about discharge./ understands medication changes. f/u cardio / ortho outpatient         REVIEW OF SYSTEMS:    All other review of systems is negative unless indicated above        PHYSICAL EXAM:    Constitutional: Awake and alert, well-developed    HEENT: Normal Hearing, MMM    Neck: Soft and supple, No LAD, No JVD    Respiratory: Breath sounds are clear bilaterally, No wheezing, rales or rhonchi    Cardiovascular: S1 and S2, RRR,  no Murmurs    Gastrointestinal: Bowel Sounds present, soft, nontender, nondistended, no guarding, no rebound    Extremities: Trace ble peripheral edema    Vascular: 2+ peripheral pulses    Neurological: A/O x 3, no focal deficits    Musculoskeletal: 5/5 strength b/l upper and lower extremities    Skin: ecchymosis on L antecubital region w/ mild tenderness, no jaundice, warm, dry        Vital Signs / Labs / Radiology / Medications : all reviewed         ASSESSMENT AND PLAN:        1) Diffuse Body Pain | Generalized Weakness | Lumbar Radiculopathy | Right Shoulder Pain     - MRI R shoulder as above.    - CT Lumbar spine > No evidence of acute fracture or spondylolisthesis, Extensive degenerative changes     - Pain management: Lidocaine patch / Tylenol    - PT consult    - CPK wnl    - Ortho consult - f/u outpatient         2) SSS and PAF with RVR     - tele monitoring. tele review as above. now in NSR    - Patient not on A/C due to hx of ICH -- cont. ASA 81mg    - s/p Cardizem gtt. -- transition to LA Cardizem 240mg      - d/c Coreg     - possible anxiety component - start xanax prn     - Cont. PO digoxin     - echo 2/2020 EF 65%, repeat echo as above. stable.     - EP f/u appreciated     - cardio ([facility]) f/u outpatient         3) hx of diastolic CHF    - Patient on prn Lasix, euvolemic now    - Hold Lasix        4) hx of RA    - Cont. Prednisone / Folic acid / Vitamin D        5) hx of Hypothyroidism    - Cont. Synthroid    - tsh wnl        6) DVT ppx    - SQ Lovenox         d/c home JAIME. CHIEF COMPLAINT/DIAGNOSIS: Weakness, Body Aches        HPI: 89 y/o female with a PMHx of anemia, Afib, b/l hearing loss, breast cancer, CHF (takes Lasix PRN for ankle swelling), goiter, HTN, hypothyroid, ICH, mass on colon, melanoma, RA, SVT, varicose veins, presents to the ED BIBEMS from home with C/C of generalized weakness and diffuse body aches started 3 days ago. Patient reports she started to fell very tired. Denies fever chills. Patient reports she has severe Rheumatoid Arthritis, wherein she has chronic back pain and LEFT shoulder pain, however this pain is different. She reports new RIGHT sided shoulder pain, wherein she cannot  lift her shoulder.  She is on methotrexate on the weekends, steroids and folic acid.         8/13 - no complaints. eager about discharge./ understands medication changes. f/u cardio / ortho outpatient .  Needs covid reswab and prior auth.        REVIEW OF SYSTEMS:    All other review of systems is negative unless indicated above    Vital Signs Last 24 Hrs    T(C): 36.4 (14 Aug 2020 05:26), Max: 36.7 (13 Aug 2020 20:00)    T(F): 97.6 (14 Aug 2020 05:26), Max: 98 (13 Aug 2020 20:00)    HR: 74 (14 Aug 2020 09:28) (55 - 77)    BP: 123/69 (14 Aug 2020 09:28) (121/60 - 142/55)    BP(mean): --    RR: 18 (14 Aug 2020 09:28) (18 - 19)    SpO2: 100% (14 Aug 2020 09:28) (95% - 100%)        PHYSICAL EXAM:    Constitutional: Awake and alert, well-developed    HEENT: Normal Hearing, MMM    Neck: Soft and supple, No LAD, No JVD    Respiratory: Breath sounds are clear bilaterally, No wheezing, rales or rhonchi    Cardiovascular: S1 and S2, RRR,  no Murmurs    Gastrointestinal: Bowel Sounds present, soft, nontender, nondistended, no guarding, no rebound    Extremities: Trace ble peripheral edema    Vascular: 2+ peripheral pulses    Neurological: A/O x 3, no focal deficits    Musculoskeletal: 5/5 strength b/l upper and lower extremities    Skin: ecchymosis on L antecubital region w/ mild tenderness, no jaundice, warm, dry        Vital Signs / Labs / Radiology / Medications : all reviewed         ASSESSMENT AND PLAN:        1) Diffuse Body Pain | Generalized Weakness | Lumbar Radiculopathy | Right Shoulder Pain     - MRI right rotator cuff tear    - CT Lumbar spine > No evidence of acute fracture or spondylolisthesis, Extensive degenerative changes     - Pain management: Lidocaine patch / Tylenol    - PT consult    - CPK wnl    - Ortho consult - f/u outpatient , no acute inpatient intervention        2) SSS and PAF with RVR     - tele monitoring. tele review as above. now in NSR    - Patient not on A/C due to hx of ICH -- cont. ASA 81mg    - s/p Cardizem gtt. -- transition to LA Cardizem 240mg      - d/c Coreg     - possible anxiety component - start xanax prn     - Cont. PO digoxin     - echo 2/2020 EF 65%, repeat echo stable    - EP f/u appreciated     - cardio (pop[facility]) f/u outpatient         3) hx of diastolic CHF    - Patient on prn Lasix, euvolemic now    - Hold Lasix        4) hx of RA    - Cont. Prednisone / Folic acid / Vitamin D        5) hx of Hypothyroidism    - Cont. Synthroid    - tsh wnl        6) DVT ppx    - SQ Lovenox         d/c home JAIME.    Pt seen/examined, reviewed and agree w/ NP Shital nguyen.

## 2020-08-13 NOTE — CHART NOTE - NSCHARTNOTEFT_GEN_A_CORE
89 yo female that Ep was following for Tachy Ab Sydrome with HTN, PAF not on oac due to h/o ICH, CHF, RA.  She was admitted with anxiety and afib with RVR.   she had prior evaluation with Dr Aponte for sinus bradycardia in 40s      A/P: SSS and PAF with RVR, now in NSR rate 60-70 bpm, last event of tachycardia was 8/12/2020  Continue rate control strategy  Not on OAC secondary to hx of ICH  Continue with Cardizem  mgs daily  Keep off of beta blocker  Continue digoxin 125mcg  F/U with primary cardiologist as outpatient

## 2020-08-14 VITALS
RESPIRATION RATE: 18 BRPM | OXYGEN SATURATION: 100 % | DIASTOLIC BLOOD PRESSURE: 69 MMHG | HEART RATE: 74 BPM | SYSTOLIC BLOOD PRESSURE: 123 MMHG

## 2020-08-14 LAB — SARS-COV-2 RNA SPEC QL NAA+PROBE: SIGNIFICANT CHANGE UP

## 2020-08-14 RX ADMIN — Medication 240 MILLIGRAM(S): at 05:43

## 2020-08-14 RX ADMIN — Medication 1 MILLIGRAM(S): at 09:01

## 2020-08-14 RX ADMIN — Medication 5 MILLIGRAM(S): at 09:01

## 2020-08-14 RX ADMIN — Medication 650 MILLIGRAM(S): at 10:41

## 2020-08-14 RX ADMIN — LIDOCAINE 1 PATCH: 4 CREAM TOPICAL at 07:49

## 2020-08-14 RX ADMIN — Medication 400 UNIT(S): at 09:02

## 2020-08-14 RX ADMIN — Medication 0.12 MILLIGRAM(S): at 09:01

## 2020-08-14 RX ADMIN — ENOXAPARIN SODIUM 40 MILLIGRAM(S): 100 INJECTION SUBCUTANEOUS at 09:01

## 2020-08-14 RX ADMIN — Medication 25 MICROGRAM(S): at 05:50

## 2020-08-14 RX ADMIN — TRAMADOL HYDROCHLORIDE 25 MILLIGRAM(S): 50 TABLET ORAL at 13:56

## 2020-08-14 RX ADMIN — Medication 650 MILLIGRAM(S): at 09:07

## 2020-08-14 RX ADMIN — Medication 81 MILLIGRAM(S): at 09:01

## 2020-08-18 DIAGNOSIS — Z85.3 PERSONAL HISTORY OF MALIGNANT NEOPLASM OF BREAST: ICD-10-CM

## 2020-08-18 DIAGNOSIS — Z88.2 ALLERGY STATUS TO SULFONAMIDES: ICD-10-CM

## 2020-08-18 DIAGNOSIS — I11.0 HYPERTENSIVE HEART DISEASE WITH HEART FAILURE: ICD-10-CM

## 2020-08-18 DIAGNOSIS — M54.16 RADICULOPATHY, LUMBAR REGION: ICD-10-CM

## 2020-08-18 DIAGNOSIS — Z79.82 LONG TERM (CURRENT) USE OF ASPIRIN: ICD-10-CM

## 2020-08-18 DIAGNOSIS — Z88.0 ALLERGY STATUS TO PENICILLIN: ICD-10-CM

## 2020-08-18 DIAGNOSIS — I50.32 CHRONIC DIASTOLIC (CONGESTIVE) HEART FAILURE: ICD-10-CM

## 2020-08-18 DIAGNOSIS — E03.9 HYPOTHYROIDISM, UNSPECIFIED: ICD-10-CM

## 2020-08-18 DIAGNOSIS — M06.9 RHEUMATOID ARTHRITIS, UNSPECIFIED: ICD-10-CM

## 2020-08-18 DIAGNOSIS — M75.101 UNSPECIFIED ROTATOR CUFF TEAR OR RUPTURE OF RIGHT SHOULDER, NOT SPECIFIED AS TRAUMATIC: ICD-10-CM

## 2020-08-18 DIAGNOSIS — Z79.899 OTHER LONG TERM (CURRENT) DRUG THERAPY: ICD-10-CM

## 2020-08-18 DIAGNOSIS — I48.0 PAROXYSMAL ATRIAL FIBRILLATION: ICD-10-CM

## 2020-10-03 NOTE — PHYSICAL THERAPY INITIAL EVALUATION ADULT - BALANCE DISTURBANCE, SYSTEM IMPAIRMENT CONTRIBUTE, REHAB EVAL
Left message for patient at      Telephone Information:   Mobile [phone]    to schedule procedure.  Patient to return call to Open Access Scheduling Patient Line [phone].   musculoskeletal

## 2020-11-19 NOTE — ASU PATIENT PROFILE, ADULT - PROVIDER NOTIFICATION
[Home] : at home, [unfilled] , at the time of the visit. [Medical Office: ([facility])___] : at the medical office located in  [Verbal consent obtained from patient] : the patient, [unfilled] [de-identified] : Patient has seen Dr. Villa and he will be scheduling sinus surgery.   He improved with prednisone but his symptoms have recurred off treatment despite the use of sinus rinse with budesonide/mupirocin.  Declines

## 2020-12-24 NOTE — PHYSICAL THERAPY INITIAL EVALUATION ADULT - PLANNED THERAPY INTERVENTIONS, PT EVAL
Principal Discharge DX:	General medical exam  
balance training/strengthening/transfer training/bed mobility training/gait training

## 2020-12-28 NOTE — H&P PST ADULT - PSH
right arm pain H/O bilateral hip replacements    H/O umbilical hernia repair    History of appendectomy    S/P breast lumpectomy  Left 2017  S/P cataract extraction  B/L; 2013  S/P colonoscopy  unsure of all dates; last one 2017  S/P vein stripping  1974

## 2021-01-01 NOTE — ED ADULT TRIAGE NOTE - NS ED TRIAGE CLINICAL UPGRADE
Progress Note -    Baby Vinnie Bravoock 9 hours male MRN: [id number]  Unit/Bed#: L&D 308(N) Encounter: [id number]      Assessment: Gestational Age: 38w3d male well   AGA  Breast feeding well  Received Vit K, Hep B and Erythromycin after birth  Has voided and stooled  Plan: normal  care  Continue to establish breast feeding  Parents desire circumcision, consent obtained , will perform procedure after 12hrs of life  24hr bundle anticipated after  2349  Will require 48hr stay due to maternal inadequate therapy for GBS+ with vancomycin    To follow up with Prashanth Pedersen within 1-2 days of discharge  Subjective     9 hours old live    Stable, no events noted overnight  Breast feeding    Output: Unmeasured Urine Occurrence: 1  Unmeasured Stool Occurrence: 1    Objective   Vitals:   Temperature: 98 5 °F (36 9 °C)  Pulse: 130  Respirations: 36  Length: 17 5" (44 5 cm) (Filed from Delivery Summary)  Weight: 3141 g (6 lb 14 8 oz)     Physical Exam:   General Appearance:  Alert, active, no distress  Head:  Normocephalic, AFOF                             Eyes:  Conjunctiva clear, red reflex present and equal bilaterally                     Mouth:  Palate intact  Respiratory:  No grunting, flaring, retractions, breath sounds clear and equal    Cardiovascular:  Regular rate and rhythm  No murmur  Adequate perfusion/capillary refill   Femoral pulse present  Abdomen:   Soft, non-distended, no masses, bowel sounds present, no HSM  Genitourinary:  Normal external genitalia  Spine:  No hair clarissa, dimples  Musculoskeletal:  Normal hips  Skin/Hair/Nails:   Skin warm, dry, and intact, no rashes               Neurologic:   Normal tone and reflexes    Labs: No pertinent labs since birth
Pain - Patient was clinically upgraded due to pain.

## 2021-01-29 NOTE — ED ADULT NURSE NOTE - CHIEF COMPLAINT QUOTE
done pt presents to ED due to left hip pain pt rec'd a cortisone shot 11 days ago with no improvement denies any trauma or injury pain with ambulation

## 2021-02-03 NOTE — ED PROVIDER NOTE - MEDICAL DECISION MAKING DETAILS
Male CHF exacerbation.  CT PE negative.  EKG nonischemic, Troponin undetectable.  Pt given Lasix IV.  Admit to medicine for further evaluation and management.

## 2021-03-01 NOTE — ASU PREOP CHECKLIST - ISOLATION PRECAUTIONS
3/1/2021 RE: Hettie Schlatter To Whom it May Concern: This is to certify that Hettie Schlatter was admitted to the hospital from 2/23/21-3/1/21. Thank you for your assistance in this matter.  
 
Sincerely, 
 
 
 
Michoacano Welch RN 
 none

## 2021-04-16 NOTE — H&P ADULT - DOES THIS PATIENT HAVE A HISTORY OF OR HAS BEEN DX WITH HEART FAILURE?
yes Implemented All Universal Safety Interventions:  [place] to call system. Call bell, personal items and telephone within reach. Instruct patient to call for assistance. Room bathroom lighting operational. Non-slip footwear when patient is off stretcher. Physically safe environment: no spills, clutter or unnecessary equipment. Stretcher in lowest position, wheels locked, appropriate side rails in place.

## 2021-08-12 NOTE — H&P ADULT - HISTORY OF PRESENT ILLNESS
Pt is a 89 yrs old women with h/o RA, and other comorbidities who presents with chief complaint of left knee pain.  Per pt on Tuesday she went to reach for her bone and thinks she may have done something to her knee.  Since yesterday she has been having excruciating knee pain, not improving so she decided to come to ED for further evaluation.      In ED: Pt unable to ambulate.  She was admitted for further diagnostic testing and treatment.
175.26

## 2021-10-04 NOTE — DISCHARGE NOTE PROVIDER - NSDCCPCAREPLAN_GEN_ALL_CORE_FT
PRINCIPAL DISCHARGE DIAGNOSIS  Diagnosis: AF (atrial fibrillation)  Assessment and Plan of Treatment: You were admitted due to rapid atrial fibrillation which is a cardiac arrythmia  - Continue to take Cardizem (new medication, sent to pharmacy) and digoxin (new medication, sent to pharmacy) to control your heart rate  - Follow up with your cardiologist Dr. Arreaga in 1 week      SECONDARY DISCHARGE DIAGNOSES  Diagnosis: HF (heart failure)  Assessment and Plan of Treatment: You had history of heart failure  - Continue to monitor your weights at home daily  - Maintain a low sodium diet  - Follow up with your cardiologist Dr. Arreaga in 1 week  - Your discharge weight is 156 lbs.    Diagnosis: Rotator cuff injury  Assessment and Plan of Treatment: - Your MRI revealed a right rotator cuff tear  - Continue pain management with tylenol, tramadol and lidocaine patch (sent to pharmacy)  - Follow up with dr. Landa for further management PRINCIPAL DISCHARGE DIAGNOSIS  Diagnosis: AF (atrial fibrillation)  Assessment and Plan of Treatment: You were admitted due to rapid atrial fibrillation which is a cardiac arrythmia  - Continue to take Cardizem (new medication, sent to pharmacy) and digoxin (new medication, sent to pharmacy) to control your heart rate  - Follow up with your cardiologist Dr. Arreaga in 1 week      SECONDARY DISCHARGE DIAGNOSES  Diagnosis: HF (heart failure)  Assessment and Plan of Treatment: You had history of heart failure  - Continue to monitor your weights at home daily  - Maintain a low sodium diet  - Follow up with your cardiologist Dr. Arreaga in 1 week  - Your discharge weight is 156 lbs.    Diagnosis: Rotator cuff injury  Assessment and Plan of Treatment: - Your MRI revealed a right rotator cuff tear  - Continue pain management with tylenol, tramadol and lidocaine patch (sent to pharmacy)  - Follow up with Dr. Landa - Orthopedist for further management PRINCIPAL DISCHARGE DIAGNOSIS  Diagnosis: AF (atrial fibrillation)  Assessment and Plan of Treatment: You were admitted due to rapid atrial fibrillation which is a cardiac arrythmia  - Continue to take Cardizem (new medication, sent to pharmacy) and digoxin (new medication, sent to pharmacy) to control your heart rate  - Continue aspirin 81mg once a day  - STOP taking Metoprolol   - Follow up with your cardiologist Dr. Arreaga in 1 week      SECONDARY DISCHARGE DIAGNOSES  Diagnosis: HF (heart failure)  Assessment and Plan of Treatment: You had history of heart failure  - Continue to monitor your weights at home daily  - Maintain a low sodium diet  - Follow up with your cardiologist Dr. Arreaga in 1 week  - Your discharge weight is 156 lbs.    Diagnosis: Rotator cuff injury  Assessment and Plan of Treatment: - Your MRI revealed a right rotator cuff tear  - Continue pain management with tylenol, tramadol and lidocaine patch (sent to pharmacy)  - Follow up with Dr. Landa - Orthopedist for further management PRINCIPAL DISCHARGE DIAGNOSIS  Diagnosis: AF (atrial fibrillation)  Assessment and Plan of Treatment: You were admitted due to rapid atrial fibrillation which is a cardiac arrythmia  - Continue to take Cardizem (new medication, sent to pharmacy) and digoxin (new medication, sent to pharmacy) to control your heart rate  - Continue aspirin 81mg once a day  - STOP taking Metoprolol   - Follow up with your cardiologist Dr. Arreaga in 1 week  - return to ED if you have chest pain, shortness of breath or palpitations      SECONDARY DISCHARGE DIAGNOSES  Diagnosis: HF (heart failure)  Assessment and Plan of Treatment: You had history of heart failure  - Continue to monitor your weights at home daily  - Maintain a low sodium diet  - Follow up with your cardiologist Dr. Arreaga in 1 week  - Your discharge weight is 156 lbs.    Diagnosis: Rotator cuff injury  Assessment and Plan of Treatment: - Your MRI revealed a right rotator cuff tear  - Continue pain management with tylenol, tramadol and lidocaine patch (sent to pharmacy)  - You were seen by Ortho team in hospital and they recommend Follow up with Dr. Landa - Orthopedist for further management Unable to assess due to medical condition

## 2021-11-03 NOTE — PATIENT PROFILE ADULT - NSPROGENARRIVEDFROM_GEN_A_NUR
What Is The Reason For Today's Visit?: Full Body Skin Examination What Is The Reason For Today's Visit? (Being Monitored For X): concerning skin lesions on an annual basis How Severe Are Your Spot(S)?: moderate home

## 2021-11-23 NOTE — H&P PST ADULT - NS PRO AD ANY ON CHART
Dr. Reis Postoperative Arthroscopic Knee Surgery   Postoperative Appointment:    Your first postoperative visit with Dr. Reis should be about 2 weeks after surgery.  If you do not already have one, please call [phone] to schedule.     Dressings/ Incisions:  After surgery, you will have knee high stockings on both legs.  You may remove the stockings at bedtime and for bathing, but wear it on your surgical leg during the day for at least one month.  Continue to wear these stockings as long as you have swelling in your leg, even if it is more than one month after your surgery date.      Your incision is closed with black Nylon suture material and will be removed at your first postoperative appointment.  You may remove your dressing and shower 3 days after surgery, Friday.  Replace the dressing every day until there is no drainage what-so-ever.  After the incisions have been dry for 24 hours, you may remove the dressings.  You should not bathe or soak in a tub until one month after surgery.      Medications:  You will receive a narcotic pain medication.  Please take as instructed.  Pain medications may cause constipation.  As long as you are taking them, drink plenty of water and take a daily stool softener such as Senokot.  You may also apply ice for 30 minutes at a time (with two hours off) to help with swelling and pain.  You are also encouraged to elevate the leg to minimize swelling.    Please take a 325 mg Aspirin daily for one month after surgery.  This will reduce the possibility of developing a blood clot in your legs.     Activities:  You may put as much weight on that affected leg as you can tolerate after surgery.  At the first postoperative appointment Dr. Reis will decide if you need formal Physical Therapy.  If you do need Physical Therapy, you will be given an order for it at the appointment.    You will be given crutches before you leave the hospital.  You should continue to use the crutches  until you have good range of motion and when the strength is back in that leg.  This time period differs for people, but it may take up to 2-3 weeks.     You should not drive until you are off your narcotic pain medications and your reflexes are back, which will take about one month.  You may return to work when you feel comfortable doing so.  This will depend on the type of job you have and will most likely be 2-3 weeks after surgery.     Call my office immediately if you experience any of the following:  · Fever over 101 degrees or chills  · Persistent warmth or redness around the knee  · Persistent or increased pain  · Increased pain in your calf muscle  · Shortness of breath or chest pain     Yes

## 2022-02-25 NOTE — ED ADULT TRIAGE NOTE - BSA (M2)
[Fall prevention counseling provided] : Fall prevention counseling provided [Adequate lighting] : Adequate lighting [No throw rugs] : No throw rugs [Use recommended devices] : Use recommended devices [Engage in a relaxing activity] : Engage in a relaxing activity [AUDIT-C Screening administered and reviewed] : AUDIT-C Screening administered and reviewed [Healthy eating counseling provided] : healthy eating [Behavioral health counseling provided] : behavioral health  [Needs reinforcement, provided] : Patient needs reinforcement on understanding of disease, goals and obesity follow-up plan; reinforcement was provided [Low Fat Diet] : Low fat diet [Low Salt Diet] : Low salt diet [Decrease Portions] : Decrease food portions [Walking] : Walking [Transportation Issues] : Transportation issues [Patient motivation] : Patient motivation [de-identified] : consider counseling \par Follow up with Derm and audiologist.  \par Advised to get BDS  1.65

## 2022-04-13 NOTE — ASU PATIENT PROFILE, ADULT - IS PATIENT PREGNANT?
"Pt states \"I can't tell much difference in my breathing with different levels of oxygen\".  PT educated pt that SpO2 at rest will be more well managed than when up and mobile.  Pt verbalized understanding.  PT assisted in weaning O2 with mobility this date and pt tolerated ambulation on 5L O2 with SpO2 at or above 89% throughout.  PT educated pt to continue frequent ambulation with pacing and PLB for progression of aerobic endurance and SpO2 with activity.  PT instructed pt to leave O2 at 6L until more consistent SpO2 achieved at lower saturation.  PT to continue to assist with weaning O2 to PLOF" no

## 2022-09-09 NOTE — DISCHARGE NOTE PROVIDER - NSDCQMSTAIRS_GEN_ALL_CORE
Patient is scheduled for new patient consult.       Electronically signed by Saulo Ramirez MA on 9/9/2022 at 8:53 AM No

## 2023-03-31 NOTE — ED PROVIDER NOTE - TIMING
resolved/sudden onset I have personally seen and examined the patient. I have collaborated with and supervised the

## 2023-04-11 NOTE — H&P ADULT - HISTORY OF PRESENT ILLNESS
88F.  c/o shortness of breath.  onset 2 weeks ago.  a/w 15 pound weight gain over 3 weeks.  (+) fatigue.  also notes she has distention of her abdomen.      when well, patient able to drive, shop and walk over short distances.  as of late, patient has observed a decrease in capacity for these activities.    in ED, found to have an elevated D-dimer.  b/l LE venous dopplers and CTA negative for VTE.  of note, CTA also negative for findings c/w acute HF.      serum glucose >200 and urine analysis 1g of glucose.  patient denies hx of DM.  (+) polyuria, but also reports a hx of recurrent UTIs.    PMHx:  pHTN;  HTN;  hyperlipidemia;  HFpEF;  ICH (2015, taken off warfarin at that time);  AF (ASA);  RA (chronic prednisone);  recurrent UTIs;  hypothyroidism.    PSHx:  hip replacement.    ALL:  PCN;  sulfa    SocHx:  community dweller.  private residence.  lives alone.  drives locally.      FHx:  NC. Split-Thickness Skin Graft Text: The defect edges were debeveled with a #15 scalpel blade.  Given the location of the defect, shape of the defect and the proximity to free margins a split thickness skin graft was deemed most appropriate.  Using a sterile surgical marker, the primary defect shape was transferred to the donor site. The split thickness graft was then harvested.  The skin graft was then placed in the primary defect and oriented appropriately.

## 2023-06-28 NOTE — ED ADULT TRIAGE NOTE - AS HEIGHT TYPE
stated Griseofulvin Counseling:  I discussed with the patient the risks of griseofulvin including but not limited to photosensitivity, cytopenia, liver damage, nausea/vomiting and severe allergy.  The patient understands that this medication is best absorbed when taken with a fatty meal (e.g., ice cream or french fries).

## 2023-07-25 NOTE — PATIENT PROFILE ADULT - NSPROPASSIVESMOKEEXPOSURE_GEN_A_NUR
[FreeTextEntry1] : Ms. Smith comes for routine follow up for HTN, Hyperlipidemia and renewal of medications. She is followed regularly by [facility] for seronegative spondyloarthropathy. She is currently receiving the biologic, Simpani (similar ot Rinvoq). SHe si due for fasting labs but she is not fasting today. She has a script for labs with [facility] in 2 weeks.\par \par The following plan has been discussed with the patient:\par 1. Continue current medications\par 2. Refer for fasting labs\par 3. Continue low sodium, low cholesterol diet\par 4. Daily exercise (brisk walk) 30 minutes daily encouraged\par 5. Continue follow up with [facility]\par 6. Get records CARDIO consult and ECHO done last year (+/- refer for evaluation of AS murmur heard today)\par \par  No

## 2023-10-11 NOTE — DISCHARGE NOTE PROVIDER - REASON FOR ADMISSION
Left knee pain. Ketoconazole Counseling:   Patient counseled regarding improving absorption with orange juice.  Adverse effects include but are not limited to breast enlargement, headache, diarrhea, nausea, upset stomach, liver function test abnormalities, taste disturbance, and stomach pain.  There is a rare possibility of liver failure that can occur when taking ketoconazole. The patient understands that monitoring of LFTs may be required, especially at baseline. The patient verbalized understanding of the proper use and possible adverse effects of ketoconazole.  All of the patient's questions and concerns were addressed.

## 2023-11-09 NOTE — ED ADULT NURSE NOTE - HOW OFTEN DO YOU HAVE A DRINK CONTAINING ALCOHOL?
Anesthesia Post-op Note    Patient: Edna Le  Procedure(s) Performed: EGD  Anesthesia type: MAC    Vitals Value Taken Time   Temp 36.4 Â°C (97.5 Â°F) 11/09/23 1220   Pulse 88 11/09/23 1220   Resp 14 11/09/23 1220   SpO2 98 % 11/09/23 1220   /100 11/09/23 1220         Patient Location: PACU Phase 1  Post-op Vital Signs:stable  Level of Consciousness: sedated and responds to stimulation  Respiratory Status: spontaneous ventilation  Cardiovascular stable  Hydration: euvolemic  Pain Management: adequately controlled  Handoff: Handoff to receiving clinician was performed and questions were answered  Vomiting: none  Nausea: None  Airway Patency:patent  Post-op Assessment: no complications and patient tolerated procedure well      No notable events documented.
Never

## 2024-04-04 NOTE — ED PROVIDER NOTE - NEUROLOGICAL, MLM
Objective testing Not warranted at this time given clinical presentation Alert and oriented, no focal deficits, no motor or sensory deficits.

## 2024-12-13 NOTE — ASU PREOP CHECKLIST - SURGICAL CONSENT
Goal Outcome Evaluation:   VSS, no acute events overnight. Patient insulin drip and IV glucommander stopped at 0130 per glucommander order. SQ Glucommander contimued. Patient tolerated bipap overnight.Heparin gtt and Amio gtt continued. Patient updated throughout care.                                           done

## 2024-12-30 NOTE — ED ADULT NURSE NOTE - NS ED PATIENT SAFETY CONCERN
This RN spoke with Skylar, pt's daughter, who wanted to reach out to provide feedback following some recent discoveries in [facility] through a visit to [facility].     Pt's daughter expressed frustration that pancreatic cancer was not discovered sooner in her care at [facility]. Also expressed that they would have preferred a phone call with results and next steps throughout outpatient workup process.    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 Care everywhere CT Abdomen-Pelvis on 12/25 resulted in EPIC.     Pt did express gratitude for care in March as PCP noticed SDH had expanded and advised further intervention. Pt's daughter expressed plans to proceed with care at [place] from this point onward.    This RN expressed gratitude for feedback and condolences for recent diagnosis. Pt's daughter expressed gratitude for listening.    No

## 2025-03-03 NOTE — H&P ADULT - NSHPOUTPATIENTPROVIDERS_GEN_ALL_CORE
Medication: atorvastatin  Last office visit date: 1/27/25  Medication Refill Protocol Failed.  Protocol approved due to: identified sig mis match, same prescription.     Hmg CoA Reductase Inhibitors (Statin) Refill Protocol - 12 Month Protocol [id number] 03:53 AM   Protocol Details Medication (including dose and sig) on current meds list      PCP: Jean Pierre Daigle  Cardiologist: Dr. Palmer   EP: Dr. Woodson

## 2025-05-13 NOTE — ED ADULT NURSE NOTE - NSSUHOSCREENINGYN_ED_ALL_ED
Medicare Message     Important Message from Medicare regarding Discharge Appeal Rights Explained to patient/caregiver; Signed/date by patient/caregiver Explained to patient/caregiver; Signed/date by patient/caregiver   Date IMM was signed 5/7/2025 5/13/2025   Time IMM was signed [id number]      Yes - the patient is able to be screened